# Patient Record
Sex: FEMALE | Race: WHITE
[De-identification: names, ages, dates, MRNs, and addresses within clinical notes are randomized per-mention and may not be internally consistent; named-entity substitution may affect disease eponyms.]

---

## 2017-05-22 ENCOUNTER — APPOINTMENT (OUTPATIENT)
Dept: DERMATOLOGY | Facility: CLINIC | Age: 72
End: 2017-05-22

## 2017-05-22 DIAGNOSIS — Z87.39 PERSONAL HISTORY OF OTHER DISEASES OF THE MUSCULOSKELETAL SYSTEM AND CONNECTIVE TISSUE: ICD-10-CM

## 2017-05-22 DIAGNOSIS — Z86.79 PERSONAL HISTORY OF OTHER DISEASES OF THE CIRCULATORY SYSTEM: ICD-10-CM

## 2017-05-22 DIAGNOSIS — Z41.1 ENCOUNTER FOR COSMETIC SURGERY: ICD-10-CM

## 2017-07-07 ENCOUNTER — APPOINTMENT (OUTPATIENT)
Dept: MAMMOGRAPHY | Facility: CLINIC | Age: 72
End: 2017-07-07

## 2017-07-07 ENCOUNTER — APPOINTMENT (OUTPATIENT)
Dept: OBGYN | Facility: CLINIC | Age: 72
End: 2017-07-07

## 2017-07-07 ENCOUNTER — APPOINTMENT (OUTPATIENT)
Dept: RADIOLOGY | Facility: CLINIC | Age: 72
End: 2017-07-07

## 2017-09-05 ENCOUNTER — APPOINTMENT (OUTPATIENT)
Dept: OBGYN | Facility: CLINIC | Age: 72
End: 2017-09-05
Payer: MEDICARE

## 2017-09-05 VITALS
BODY MASS INDEX: 31.41 KG/M2 | WEIGHT: 184 LBS | SYSTOLIC BLOOD PRESSURE: 120 MMHG | HEIGHT: 64 IN | DIASTOLIC BLOOD PRESSURE: 70 MMHG

## 2017-09-05 DIAGNOSIS — N32.81 OVERACTIVE BLADDER: ICD-10-CM

## 2017-09-05 PROCEDURE — 99213 OFFICE O/P EST LOW 20 MIN: CPT

## 2017-09-07 LAB — HPV HIGH+LOW RISK DNA PNL CVX: NEGATIVE

## 2017-09-08 LAB — CYTOLOGY CVX/VAG DOC THIN PREP: NORMAL

## 2017-10-17 ENCOUNTER — FORM ENCOUNTER (OUTPATIENT)
Age: 72
End: 2017-10-17

## 2017-10-18 ENCOUNTER — APPOINTMENT (OUTPATIENT)
Dept: MAMMOGRAPHY | Facility: CLINIC | Age: 72
End: 2017-10-18
Payer: MEDICARE

## 2017-10-18 ENCOUNTER — OUTPATIENT (OUTPATIENT)
Dept: OUTPATIENT SERVICES | Facility: HOSPITAL | Age: 72
LOS: 1 days | End: 2017-10-18
Payer: MEDICARE

## 2017-10-18 DIAGNOSIS — Z00.8 ENCOUNTER FOR OTHER GENERAL EXAMINATION: ICD-10-CM

## 2017-10-18 PROCEDURE — 77063 BREAST TOMOSYNTHESIS BI: CPT

## 2017-10-18 PROCEDURE — G0202: CPT | Mod: 26

## 2017-10-18 PROCEDURE — 77063 BREAST TOMOSYNTHESIS BI: CPT | Mod: 26

## 2017-10-18 PROCEDURE — 77067 SCR MAMMO BI INCL CAD: CPT

## 2017-11-02 ENCOUNTER — OTHER (OUTPATIENT)
Age: 72
End: 2017-11-02

## 2017-11-02 ENCOUNTER — RESULT CHARGE (OUTPATIENT)
Age: 72
End: 2017-11-02

## 2017-11-02 LAB
BILIRUB UR QL STRIP: NORMAL
GLUCOSE UR-MCNC: NORMAL
HCG UR QL: 0.2 EU/DL
HGB UR QL STRIP.AUTO: NORMAL
KETONES UR-MCNC: NORMAL
LEUKOCYTE ESTERASE UR QL STRIP: NORMAL
NITRITE UR QL STRIP: NORMAL
PH UR STRIP: 7
PROT UR STRIP-MCNC: NORMAL
SP GR UR STRIP: 1.01

## 2017-11-06 LAB — BACTERIA UR CULT: NORMAL

## 2017-12-20 ENCOUNTER — APPOINTMENT (OUTPATIENT)
Dept: DERMATOLOGY | Facility: CLINIC | Age: 72
End: 2017-12-20
Payer: MEDICARE

## 2017-12-20 VITALS — BODY MASS INDEX: 31.41 KG/M2 | HEIGHT: 64 IN | WEIGHT: 184 LBS

## 2017-12-20 PROCEDURE — 99213 OFFICE O/P EST LOW 20 MIN: CPT

## 2018-01-13 ENCOUNTER — APPOINTMENT (OUTPATIENT)
Dept: DERMATOLOGY | Facility: CLINIC | Age: 73
End: 2018-01-13
Payer: MEDICARE

## 2018-01-13 PROCEDURE — 99213 OFFICE O/P EST LOW 20 MIN: CPT

## 2018-05-16 ENCOUNTER — APPOINTMENT (OUTPATIENT)
Dept: DERMATOLOGY | Facility: CLINIC | Age: 73
End: 2018-05-16

## 2018-05-21 ENCOUNTER — APPOINTMENT (OUTPATIENT)
Dept: DERMATOLOGY | Facility: CLINIC | Age: 73
End: 2018-05-21
Payer: MEDICARE

## 2018-05-21 DIAGNOSIS — D18.00 HEMANGIOMA UNSPECIFIED SITE: ICD-10-CM

## 2018-05-21 PROCEDURE — 17000 DESTRUCT PREMALG LESION: CPT

## 2018-05-21 PROCEDURE — 99213 OFFICE O/P EST LOW 20 MIN: CPT | Mod: 25

## 2018-05-21 RX ORDER — CELECOXIB 200 MG/1
200 CAPSULE ORAL
Qty: 30 | Refills: 0 | Status: DISCONTINUED | COMMUNITY
Start: 2017-07-16 | End: 2018-05-21

## 2018-05-21 RX ORDER — TRAMADOL HYDROCHLORIDE 50 MG/1
50 TABLET, COATED ORAL
Qty: 60 | Refills: 0 | Status: DISCONTINUED | COMMUNITY
Start: 2017-07-16 | End: 2018-05-21

## 2018-05-21 RX ORDER — PHYTONADIONE 5 MG/1
5 TABLET ORAL
Qty: 1 | Refills: 0 | Status: DISCONTINUED | COMMUNITY
Start: 2017-07-16 | End: 2018-05-21

## 2018-05-21 RX ORDER — OXYCODONE 5 MG/1
5 TABLET ORAL
Qty: 30 | Refills: 0 | Status: DISCONTINUED | COMMUNITY
Start: 2017-07-16 | End: 2018-05-21

## 2018-05-21 RX ORDER — WARFARIN 1 MG/1
1 TABLET ORAL
Qty: 175 | Refills: 0 | Status: DISCONTINUED | COMMUNITY
Start: 2017-07-16 | End: 2018-05-21

## 2018-10-29 ENCOUNTER — APPOINTMENT (OUTPATIENT)
Dept: DERMATOLOGY | Facility: CLINIC | Age: 73
End: 2018-10-29
Payer: MEDICARE

## 2018-10-29 DIAGNOSIS — L40.9 PSORIASIS, UNSPECIFIED: ICD-10-CM

## 2018-10-29 DIAGNOSIS — L24.89 IRRITANT CONTACT DERMATITIS DUE TO OTHER AGENTS: ICD-10-CM

## 2018-10-29 PROCEDURE — 11100 BX SKIN SUBCUTANEOUS&/MUCOUS MEMBRANE 1 LESION: CPT

## 2018-10-29 PROCEDURE — 99214 OFFICE O/P EST MOD 30 MIN: CPT | Mod: 25

## 2018-12-10 LAB — CORE LAB BIOPSY: NORMAL

## 2019-01-08 ENCOUNTER — INPATIENT (INPATIENT)
Facility: HOSPITAL | Age: 74
LOS: 0 days | Discharge: HOME | End: 2019-01-09
Attending: INTERNAL MEDICINE | Admitting: INTERNAL MEDICINE
Payer: MEDICARE

## 2019-01-08 VITALS
HEART RATE: 63 BPM | TEMPERATURE: 98 F | WEIGHT: 160.06 LBS | HEIGHT: 63 IN | DIASTOLIC BLOOD PRESSURE: 65 MMHG | RESPIRATION RATE: 16 BRPM | OXYGEN SATURATION: 100 % | SYSTOLIC BLOOD PRESSURE: 185 MMHG

## 2019-01-08 LAB
ALBUMIN SERPL ELPH-MCNC: 3.7 G/DL — SIGNIFICANT CHANGE UP (ref 3.3–5)
ALP SERPL-CCNC: 78 U/L — SIGNIFICANT CHANGE UP (ref 40–120)
ALT FLD-CCNC: 24 U/L — SIGNIFICANT CHANGE UP (ref 12–78)
ANION GAP SERPL CALC-SCNC: 8 MMOL/L — SIGNIFICANT CHANGE UP (ref 5–17)
APTT BLD: 28.7 SEC — SIGNIFICANT CHANGE UP (ref 27.5–36.3)
AST SERPL-CCNC: 19 U/L — SIGNIFICANT CHANGE UP (ref 15–37)
BILIRUB SERPL-MCNC: 0.4 MG/DL — SIGNIFICANT CHANGE UP (ref 0.2–1.2)
BUN SERPL-MCNC: 19 MG/DL — SIGNIFICANT CHANGE UP (ref 7–23)
CALCIUM SERPL-MCNC: 9 MG/DL — SIGNIFICANT CHANGE UP (ref 8.5–10.1)
CHLORIDE SERPL-SCNC: 110 MMOL/L — HIGH (ref 96–108)
CO2 SERPL-SCNC: 25 MMOL/L — SIGNIFICANT CHANGE UP (ref 22–31)
CREAT SERPL-MCNC: 0.73 MG/DL — SIGNIFICANT CHANGE UP (ref 0.5–1.3)
GLUCOSE SERPL-MCNC: 163 MG/DL — HIGH (ref 70–99)
HCT VFR BLD CALC: 40.9 % — SIGNIFICANT CHANGE UP (ref 34.5–45)
HGB BLD-MCNC: 13.3 G/DL — SIGNIFICANT CHANGE UP (ref 11.5–15.5)
INR BLD: 1.04 RATIO — SIGNIFICANT CHANGE UP (ref 0.88–1.16)
MAGNESIUM SERPL-MCNC: 2.1 MG/DL — SIGNIFICANT CHANGE UP (ref 1.6–2.6)
MCHC RBC-ENTMCNC: 29.1 PG — SIGNIFICANT CHANGE UP (ref 27–34)
MCHC RBC-ENTMCNC: 32.5 GM/DL — SIGNIFICANT CHANGE UP (ref 32–36)
MCV RBC AUTO: 89.5 FL — SIGNIFICANT CHANGE UP (ref 80–100)
NRBC # BLD: 0 /100 WBCS — SIGNIFICANT CHANGE UP (ref 0–0)
NT-PROBNP SERPL-SCNC: 128 PG/ML — SIGNIFICANT CHANGE UP (ref 0–450)
PLATELET # BLD AUTO: 206 K/UL — SIGNIFICANT CHANGE UP (ref 150–400)
POTASSIUM SERPL-MCNC: 4.1 MMOL/L — SIGNIFICANT CHANGE UP (ref 3.5–5.3)
POTASSIUM SERPL-SCNC: 4.1 MMOL/L — SIGNIFICANT CHANGE UP (ref 3.5–5.3)
PROT SERPL-MCNC: 7.2 GM/DL — SIGNIFICANT CHANGE UP (ref 6–8.3)
PROTHROM AB SERPL-ACNC: 11.6 SEC — SIGNIFICANT CHANGE UP (ref 10–12.9)
RBC # BLD: 4.57 M/UL — SIGNIFICANT CHANGE UP (ref 3.8–5.2)
RBC # FLD: 13.5 % — SIGNIFICANT CHANGE UP (ref 10.3–14.5)
SODIUM SERPL-SCNC: 143 MMOL/L — SIGNIFICANT CHANGE UP (ref 135–145)
TROPONIN I SERPL-MCNC: <0.015 NG/ML — SIGNIFICANT CHANGE UP (ref 0.01–0.04)
TROPONIN I SERPL-MCNC: <0.015 NG/ML — SIGNIFICANT CHANGE UP (ref 0.01–0.04)
WBC # BLD: 8.08 K/UL — SIGNIFICANT CHANGE UP (ref 3.8–10.5)
WBC # FLD AUTO: 8.08 K/UL — SIGNIFICANT CHANGE UP (ref 3.8–10.5)

## 2019-01-08 PROCEDURE — 99285 EMERGENCY DEPT VISIT HI MDM: CPT

## 2019-01-08 PROCEDURE — 99223 1ST HOSP IP/OBS HIGH 75: CPT

## 2019-01-08 PROCEDURE — 93010 ELECTROCARDIOGRAM REPORT: CPT

## 2019-01-08 PROCEDURE — 70498 CT ANGIOGRAPHY NECK: CPT | Mod: 26

## 2019-01-08 PROCEDURE — 70496 CT ANGIOGRAPHY HEAD: CPT | Mod: 26

## 2019-01-08 PROCEDURE — 71046 X-RAY EXAM CHEST 2 VIEWS: CPT | Mod: 26

## 2019-01-08 RX ORDER — ATORVASTATIN CALCIUM 80 MG/1
20 TABLET, FILM COATED ORAL AT BEDTIME
Qty: 0 | Refills: 0 | Status: DISCONTINUED | OUTPATIENT
Start: 2019-01-08 | End: 2019-01-09

## 2019-01-08 RX ORDER — MECLIZINE HCL 12.5 MG
25 TABLET ORAL ONCE
Qty: 0 | Refills: 0 | Status: COMPLETED | OUTPATIENT
Start: 2019-01-08 | End: 2019-01-08

## 2019-01-08 RX ORDER — ACETAMINOPHEN 500 MG
650 TABLET ORAL EVERY 6 HOURS
Qty: 0 | Refills: 0 | Status: DISCONTINUED | OUTPATIENT
Start: 2019-01-08 | End: 2019-01-09

## 2019-01-08 RX ORDER — LOSARTAN POTASSIUM 100 MG/1
100 TABLET, FILM COATED ORAL DAILY
Qty: 0 | Refills: 0 | Status: DISCONTINUED | OUTPATIENT
Start: 2019-01-08 | End: 2019-01-09

## 2019-01-08 RX ORDER — ONDANSETRON 8 MG/1
4 TABLET, FILM COATED ORAL ONCE
Qty: 0 | Refills: 0 | Status: COMPLETED | OUTPATIENT
Start: 2019-01-08 | End: 2019-01-08

## 2019-01-08 RX ORDER — MECLIZINE HCL 12.5 MG
25 TABLET ORAL EVERY 8 HOURS
Qty: 0 | Refills: 0 | Status: DISCONTINUED | OUTPATIENT
Start: 2019-01-08 | End: 2019-01-09

## 2019-01-08 RX ORDER — SODIUM CHLORIDE 9 MG/ML
1000 INJECTION INTRAMUSCULAR; INTRAVENOUS; SUBCUTANEOUS ONCE
Qty: 0 | Refills: 0 | Status: COMPLETED | OUTPATIENT
Start: 2019-01-08 | End: 2019-01-08

## 2019-01-08 RX ORDER — ASPIRIN/CALCIUM CARB/MAGNESIUM 324 MG
325 TABLET ORAL ONCE
Qty: 0 | Refills: 0 | Status: COMPLETED | OUTPATIENT
Start: 2019-01-08 | End: 2019-01-08

## 2019-01-08 RX ORDER — ENOXAPARIN SODIUM 100 MG/ML
40 INJECTION SUBCUTANEOUS EVERY 24 HOURS
Qty: 0 | Refills: 0 | Status: DISCONTINUED | OUTPATIENT
Start: 2019-01-08 | End: 2019-01-09

## 2019-01-08 RX ORDER — DIAZEPAM 5 MG
2 TABLET ORAL ONCE
Qty: 0 | Refills: 0 | Status: DISCONTINUED | OUTPATIENT
Start: 2019-01-08 | End: 2019-01-08

## 2019-01-08 RX ORDER — METOPROLOL TARTRATE 50 MG
100 TABLET ORAL DAILY
Qty: 0 | Refills: 0 | Status: DISCONTINUED | OUTPATIENT
Start: 2019-01-08 | End: 2019-01-09

## 2019-01-08 RX ORDER — METOPROLOL TARTRATE 50 MG
25 TABLET ORAL ONCE
Qty: 0 | Refills: 0 | Status: COMPLETED | OUTPATIENT
Start: 2019-01-08 | End: 2019-01-08

## 2019-01-08 RX ORDER — ONDANSETRON 8 MG/1
4 TABLET, FILM COATED ORAL EVERY 4 HOURS
Qty: 0 | Refills: 0 | Status: DISCONTINUED | OUTPATIENT
Start: 2019-01-08 | End: 2019-01-09

## 2019-01-08 RX ORDER — ASPIRIN/CALCIUM CARB/MAGNESIUM 324 MG
81 TABLET ORAL DAILY
Qty: 0 | Refills: 0 | Status: DISCONTINUED | OUTPATIENT
Start: 2019-01-08 | End: 2019-01-09

## 2019-01-08 RX ADMIN — ONDANSETRON 4 MILLIGRAM(S): 8 TABLET, FILM COATED ORAL at 07:25

## 2019-01-08 RX ADMIN — SODIUM CHLORIDE 1000 MILLILITER(S): 9 INJECTION INTRAMUSCULAR; INTRAVENOUS; SUBCUTANEOUS at 09:09

## 2019-01-08 RX ADMIN — ATORVASTATIN CALCIUM 20 MILLIGRAM(S): 80 TABLET, FILM COATED ORAL at 21:35

## 2019-01-08 RX ADMIN — SODIUM CHLORIDE 1000 MILLILITER(S): 9 INJECTION INTRAMUSCULAR; INTRAVENOUS; SUBCUTANEOUS at 07:00

## 2019-01-08 RX ADMIN — Medication 25 MILLIGRAM(S): at 08:40

## 2019-01-08 RX ADMIN — LOSARTAN POTASSIUM 100 MILLIGRAM(S): 100 TABLET, FILM COATED ORAL at 18:01

## 2019-01-08 RX ADMIN — Medication 325 MILLIGRAM(S): at 10:09

## 2019-01-08 RX ADMIN — Medication 25 MILLIGRAM(S): at 10:09

## 2019-01-08 RX ADMIN — ENOXAPARIN SODIUM 40 MILLIGRAM(S): 100 INJECTION SUBCUTANEOUS at 18:02

## 2019-01-08 NOTE — ED PROVIDER NOTE - MEDICAL DECISION MAKING DETAILS
73 YOF PMH 40 pack years, HTN, p/w vertigo that began this morning at 0500.  Significant HTN.  Exam NIH 0 no signs of CVA at this time.  DDX TIA, BPPV, electrolyte abnormality, VB insufficiency, ACS.  Given presentation and significant risk factors ABCD2 score 4, smoking, HTN, PT will require admission for MRI, neurology consultation and secondary prevention.  In the ED metabolic and hematologic evaluation, CTA r/o VB insufficiency, symptomatic treatment, EKG and trop r/o ACS.  Reassess.

## 2019-01-08 NOTE — ED ADULT NURSE REASSESSMENT NOTE - NS ED NURSE REASSESS COMMENT FT1
Pt reports feeling good relief with medication at this time. Awaiting admission at this time. Pt and family updated on POC and process for admission with verbalized understanding.

## 2019-01-08 NOTE — ED ADULT NURSE NOTE - OBJECTIVE STATEMENT
Patient presents to ED complaining of dizziness. Patient states she woke up an hour ago, when laying back in bed patient felt the room spinning, patient had orange juice and became nauseous. Patient states dizziness worsens when she moves her head. Patient denies recent fever, chills, diarrhea, CP, SOB. Patient denies syncope, abdominal pain. Patient vomiting on arrival. Patient states she had a similar episode last week that resolved after an hour. PMHx HTN

## 2019-01-08 NOTE — CONSULT NOTE ADULT - SUBJECTIVE AND OBJECTIVE BOX
Patient is a 73y old  Female who presents with a chief complaint of complain of vertigo.    HPI:  74 yo female  PMH of HTN, presented with dizziness associated with nausea and vomiting that started this am. She states that she had milder symptoms yesterday but ignored.  She presented to the hospital and was being evaluated for neurological and cardiac causes.  She states that her symptoms are slowly resolving although she still feels dizzy.    No CP or pressure.  No SOB.   Denies any palpitations, or syncope.    Family hx:  Mother: No CAD, No CVA  Father: No CAD, No CVA (08 Jan 2019 12:31)      PAST MEDICAL & SURGICAL HISTORY:  HTN (hypertension)  No significant past surgical history      MEDICATIONS  (STANDING):  aspirin enteric coated 81 milliGRAM(s) Oral daily  atorvastatin 20 milliGRAM(s) Oral at bedtime  enoxaparin Injectable 40 milliGRAM(s) SubCutaneous every 24 hours  losartan 100 milliGRAM(s) Oral daily  metoprolol succinate  milliGRAM(s) Oral daily    MEDICATIONS  (PRN):  acetaminophen   Tablet .. 650 milliGRAM(s) Oral every 6 hours PRN Mild Pain (1 - 3)  acetaminophen   Tablet .. 650 milliGRAM(s) Oral every 6 hours PRN Temp greater or equal to 38C (100.4F)  meclizine 25 milliGRAM(s) Oral every 8 hours PRN Dizziness  ondansetron Injectable 4 milliGRAM(s) IV Push every 4 hours PRN Nausea and/or Vomiting      FAMILY HISTORY:  No pertinent family history in first degree relatives      SOCIAL HISTORY:  active smoker, 5 cig/d    REVIEW OF SYSTEMS:  CONSTITUTIONAL:    No fatigue, malaise, lethargy.  No fever or chills.  HEENT:  Eyes:  No visual changes.     ENT:  No epistaxis.  No sinus pain.    RESPIRATORY:  No cough.  No wheeze.  No hemoptysis.  No shortness of breath.  CARDIOVASCULAR:  No chest pains.  No palpitations. No shortness of breath, No orthopnea or PND. c/o dizziness  GASTROINTESTINAL:  No abdominal pain.  c/o nausea or vomiting.    GENITOURINARY:    No hematuria.    MUSCULOSKELETAL:  No musculoskeletal pain.  No joint swelling.  No arthritis.  NEUROLOGICAL:  No tingling or numbness or weakness.  PSYCHIATRIC:  No confusion  SKIN:  No rashes.    ENDOCRINE:  No unexplained weight loss.  No polydipsia.   HEMATOLOGIC:  No anemia.  No prolonged or excessive bleeding.   ALLERGIC AND IMMUNOLOGIC:  No pruritus.          Vital Signs Last 24 Hrs  T(C): 36.6 (08 Jan 2019 06:44), Max: 36.6 (08 Jan 2019 06:44)  T(F): 97.8 (08 Jan 2019 06:44), Max: 97.8 (08 Jan 2019 06:44)  HR: 59 (08 Jan 2019 13:15) (59 - 63)  BP: 122/59 (08 Jan 2019 13:15) (119/59 - 185/65)  BP(mean): --  RR: 18 (08 Jan 2019 13:15) (16 - 18)  SpO2: 96% (08 Jan 2019 13:15) (96% - 100%)    PHYSICAL EXAM-    Constitutional: no acute distress    Head: Head is normocephalic and atraumatic.      Neck:  No JVD.     Cardiovascular: Regular rate and rhythm without S3, S4. No murmurs or rubs are appreciated.      Respiratory: Breathsounds are normal. No rales. No wheezing.    Abdomen: Soft, nontender, nondistended with positive bowel sounds.      Extremity: No tenderness. No  pitting edema     Neurologic: The patient is alert and oriented.      Skin: No rash, no obvious lesions noted.      Psychiatric: The patient appears to be emotionally stable.      INTERPRETATION OF TELEMETRY: sinus bradycardia 40/min    ECG: Sinus rythm ,L axis,  Twave inversion in III and AVF, V5-6.    I&O's Detail      LABS:                        13.3   8.08  )-----------( 206      ( 08 Jan 2019 06:56 )             40.9     01-08    143  |  110<H>  |  19  ----------------------------<  163<H>  4.1   |  25  |  0.73    Ca    9.0      08 Jan 2019 06:56  Mg     2.1     01-08    TPro  7.2  /  Alb  3.7  /  TBili  0.4  /  DBili  x   /  AST  19  /  ALT  24  /  AlkPhos  78  01-08    CARDIAC MARKERS ( 08 Jan 2019 06:56 )  <0.015 ng/mL / x     / x     / x     / x          PT/INR - ( 08 Jan 2019 06:56 )   PT: 11.6 sec;   INR: 1.04 ratio         PTT - ( 08 Jan 2019 06:56 )  PTT:28.7 sec    I&O's Summary    BNPSerum Pro-Brain Natriuretic Peptide: 128 pg/mL (01-08 @ 06:56)    RADIOLOGY & ADDITIONAL STUDIES:  < from: CT Angio Head w/ IV Cont (01.08.19 @ 08:47) >  IMPRESSION:          1.   Right carotid system:   Focal plaque at the bifurcation with   focal moderate (50%) stenosis at the origin of the RIGHT internal carotid   artery although visualization is hindered by patient motion at this   level.              2.   Left carotid system:  Nohemodynamically significant stenosis.           3.   Intracranial circulation:  No significant vascular lesion.             4.   Brain:  No significant lesion identified.                      JOSÉ TSE M.D., ATTENDING RADIOLOGIST  This document has been electronically signed. Jan 8 2019  8:51AM    < end of copied text >

## 2019-01-08 NOTE — ED PROVIDER NOTE - CRANIAL NERVE AND PUPILLARY EXAM
cough reflex intact/corneal reflex intact/cranial nerves 2-12 intact/central and peripheral vision intact/gag reflex intact/tongue is midline

## 2019-01-08 NOTE — ED PROVIDER NOTE - OBJECTIVE STATEMENT
73 YOF PMH 40 pack years, HTN, p/w vertigo that began this morning at 0500.  Sx were persistent associated N/V worse with head movement.  In the ED SX resolved completely with the exception of nausea and light headed feelings.  In the ED at 0736 No apraxia, aphasia, agnosia, dysphonia, dysarthria, dysphagia,  paresthesia, paralysis, unilateral weakness, sensory deficits in the face or extremities, vertigo, vision changes, headache, nausea, vomiting, or loss of consciousness.  At no time was there chest pain or SOB.  No fevers, chills, sweats, weight loss, fatigue, or malaise. No urinary symptoms.  PT had a similar presentation 2 weeks ago that resolved spontaneously.      MEDS metoprolol and losartan.

## 2019-01-08 NOTE — CONSULT NOTE ADULT - ASSESSMENT
74 yo female with PMH of HTN, an episode of mild vertigo 2 weeks ago; presented to ED with c/o sudden onset dizziness, lightheadedness,  head felt heavy like it was spinning, associated with nauseous and vomited; sx worsened with head movements. In the ED, symptoms improved, she continued to have lightheadedness.     Pt was evaluated for stroke; CT head/neck angio did not reveal territorial stroke, thrombus of VB stenosis, MI 50 % stenosis was noted.      # Most likely BPPV; now improved    - Pt educated about postural changes.  - If sx recur, will need vestibular therapy as OP    # TIA (brainstem) cannot be excluded; No IV tpa given because sx resolved fully; NIHSS 0    - Agree with ASA,   - Lipid profile, Statin  - MRI brain  - PT    # MI 50% stenosis; asymptomatic    D/W pt and her family

## 2019-01-08 NOTE — CONSULT NOTE ADULT - SUBJECTIVE AND OBJECTIVE BOX
CC: 73 y old  Female who presents with a chief complaint of complain of vertigo (08 Jan 2019 14:34)    HPI:  72 yo female with PMH of HTN, presented to ED with c/o vertigo. Pt states she woke up at 0500 AM to go to the bathroom, upon returning back she felt dizzy, her head felt heavy like it was spinning, she was nauseous and vomited, was unable to walk; sx worsened with head movements. In the ED, her symptoms improved, she continued to have lightheadedness. Pt was evaluated for stroke; CT head/neck angio did not reveal territorial stroke, thrombus of VB stenosis, MI 50 % stenosis was noted.      Pt denies associated aphasia, dysarthria, dysphagia,  paresthesia, focal weakness, facial droop, visual blurring, headache or loss of consciousness. Patient had a similar but milder vertigo epiosde 2 weeks ago that resolved spontaneously.    No history of recent fever, ear pain, skin rashes; but does admit to some UR symptoms.     PAST MEDICAL & SURGICAL HISTORY:  HTN (hypertension)    FAMILY HISTORY:  No pertinent family history in first degree relatives    Social Hx: Nonsmoker, no drug or alcohol use    MEDICATIONS  (STANDING):  aspirin enteric coated 81 milliGRAM(s) Oral daily  atorvastatin 20 milliGRAM(s) Oral at bedtime  enoxaparin Injectable 40 milliGRAM(s) SubCutaneous every 24 hours  losartan 100 milliGRAM(s) Oral daily  metoprolol succinate  milliGRAM(s) Oral daily     Allergies  No Known Allergies    Intolerances    ROS: Pertinent positives in HPI, all other ROS were reviewed and are negative.      Vital Signs Last 24 Hrs  T(C): 36.6 (08 Jan 2019 06:44), Max: 36.6 (08 Jan 2019 06:44)  T(F): 97.8 (08 Jan 2019 06:44), Max: 97.8 (08 Jan 2019 06:44)  HR: 59 (08 Jan 2019 13:15) (59 - 63)  BP: 122/59 (08 Jan 2019 13:15) (119/59 - 185/65)  BP(mean): --  RR: 18 (08 Jan 2019 13:15) (16 - 18)  SpO2: 96% (08 Jan 2019 13:15) (96% - 100%)    PE:  Constitutional: Normocephalic, awake and alert.  HEENT: PERRLA, EOMI, no nystagmus, no diplopia  Neck: Supple.  Respiratory: Breath sounds are clear bilaterally  Cardiovascular: S1 and S2, regular   Extremities:  no edema  Vascular: Caritid Bruit - no  Musculoskeletal: no joint swelling/tenderness, no abnormal movements  Skin: No rashes    Neurological exam:  HF: A x O x 3. Appropriately interactive, normal affect. Speech fluent, No Aphasia or paraphasic errors. Naming /repetition intact   CN: RONALD, EOMI, VFF, facial sensation normal, no NLFD, tongue midline, Palate moves equally, SCM equal bilaterally  Motor: No pronator drift, Strength 5/5 in all 4 ext, normal bulk and tone, no tremor, rigidity .    Sens: Intact to light touch     Reflexes: Symmetric 2+, AJ 0, downgoing toes b/l  Coord:  No FNFA, dysmetria,   Gait/Balance: Not tested    NIHSS: 0    Labs:   01-08    143  |  110<H>  |  19  ----------------------------<  163<H>  4.1   |  25  |  0.73    Ca    9.0      08 Jan 2019 06:56  Mg     2.1     01-08    TPro  7.2  /  Alb  3.7  /  TBili  0.4  /  DBili  x   /  AST  19  /  ALT  24  /  AlkPhos  78  01-08                     13.3   8.08  )-----------( 206      ( 08 Jan 2019 06:56 )             40.9       Radiology:  - CT Head angio: < from: CT Angio Head w/ IV Cont (01.08.19 @ 08:47) >  IMPRESSION:          1.   Right carotid system:   Focal plaque at the bifurcation with   focal moderate (50%) stenosis at the origin of the RIGHT internal carotid   artery although visualization is hindered by patient motion at this   level.              2.   Left carotid system:  Nohemodynamically significant stenosis.           3.   Intracranial circulation:  No significant vascular lesion.             4.   Brain:  No significant lesion identified.          < end of copied text >

## 2019-01-08 NOTE — ED ADULT TRIAGE NOTE - CHIEF COMPLAINT QUOTE
States she was feeling fine all day and night, just woke up to go to bathroom and became extremely dizzy. Dizziness has been unresolved since with new onset of nausea. Denies any other complaints.

## 2019-01-08 NOTE — CONSULT NOTE ADULT - ASSESSMENT
DIzziness- more so vertigenous in nature.  Her CT head did not reveal any acute abnormalities.  Her MRI is pending.  Check orthostatic BP readings.  She denies decreased po intake.  No arrythmias on telemetry so far.  EKG or blood work did not reveal any ischemia.   Her Nuclear stress test from 2016 did not reveal any ischemia.  Echocardiogram showed normal LVEF in 2017.    Carotid artery disease- Right side- aggressive risk factor modification.  Check lipid panel and uptitrate statin as needed.     HTN- BP at presentation was noted to be high but now it is noted to be normal.  Continue home BP meds.    Hyperlipidemia- continue statin.

## 2019-01-08 NOTE — ED ADULT NURSE NOTE - NSIMPLEMENTINTERV_GEN_ALL_ED
Implemented All Fall Risk Interventions:  [place] to call system. Call bell, personal items and telephone within reach. Instruct patient to call for assistance. Room bathroom lighting operational. Non-slip footwear when patient is off stretcher. Physically safe environment: no spills, clutter or unnecessary equipment. Stretcher in lowest position, wheels locked, appropriate side rails in place. Provide visual cue, wrist band, yellow gown, etc. Monitor gait and stability. Monitor for mental status changes and reorient to person, place, and time. Review medications for side effects contributing to fall risk. Reinforce activity limits and safety measures with patient and family.

## 2019-01-08 NOTE — H&P ADULT - HISTORY OF PRESENT ILLNESS
74 yo female  PMH of HTN, presented with vertigo that began this morning at 0500.  Symptoms were persistent with associated N/V which worse with head movement.  In the ED her symptoms resolved completely with the exception of nausea and light headed feelings.  In the ED at 0736 No apraxia, aphasia, agnosia, dysphonia, dysarthria, dysphagia,  paresthesia, paralysis, unilateral weakness, sensory deficits in the face or extremities, vertigo, vision changes, headache, nausea, vomiting, or loss of consciousness.  At no time was there chest pain or SOB.  No fevers, chills, sweats, weight loss, fatigue, or malaise. No urinary symptoms.  Patient had a similar presentation 2 weeks ago that resolved spontaneously.    Family hx:  Mother: No CAD, No CVA  Father: No CAD, No CVA

## 2019-01-08 NOTE — H&P ADULT - NEUROLOGICAL DETAILS
normal strength/alert and oriented x 3/responds to pain/responds to verbal commands/sensation intact/deep reflexes intact/cranial nerves intact

## 2019-01-08 NOTE — H&P ADULT - ASSESSMENT
74 yo female presented with severe vertigo.    A/P:    1.  Severe Vertigo  Possible TIA  -will follow in Telemetry  -CT head-neg  -will get MRI brain  -follow echo report  -CTA neck-50% stenosis at the origin of the Right internal carotid   -follow Lipid profile, HbA1C, PT eval  -fall precaution  -will follow Neurology consult  -patient has some concern about her cardiac status as some outpt tests are pending in the next few days, will follow cardiology consult regarding that  -follow clinically with neurochecks   -on aspirin and statin  -give meclizine and zofran as needed    2.  HTN  -stable now  -follow BP and adjust meds as needed    3.  Lovenox for DVT ppx 74 yo female presented with severe vertigo.    A/P:    1.  Severe Vertigo  Possible TIA  -will follow in Telemetry  -CT head-neg  -will get MRI brain  -follow echo report  -CTA neck-50% stenosis at the origin of the Right internal carotid   -follow Lipid profile, HbA1C, PT eval  -fall precaution  -will follow Neurology consult  -patient has some concern about her cardiac status as some outpt tests are pending in the next few days, will follow cardiology consult regarding that  -follow clinically with neurochecks   -on aspirin and statin  -give meclizine and zofran as needed    2.  HTN  -stable now  -follow BP and adjust meds as needed    3.  Lovenox for DVT ppx     4.  Code status: Full code.

## 2019-01-08 NOTE — ED PROVIDER NOTE - PROGRESS NOTE DETAILS
Justification for admission persistent nausea, high risk TIA.  NIH at this time remains 0.  Given presentation and findings, patient requires hospitalization.  I discussed all findings from our evaluation today with the patient and the medical necessity for admission to the hospital.  I addressed expectations regarding the admission and I discussed the plan of care in detail.  I addressed all questions and concerns regarding the admission and the plan of care.  I used verbal teach back to confirm understanding.  The patient agreed with the admission and the plan of care.  Pt signed out to Dr. Alexandre.  Patient's history, vital signs, lab results, imaging, pertinent findings, and clinical condition reviewed during sign out.  At sign out patient admitted in hemodynamically stable condition in no acute distress.  Neurology Justification for admission persistent nausea, high risk TIA.  NIH at this time remains 0.  Given presentation and findings, patient requires hospitalization.  I discussed all findings from our evaluation today with the patient and the medical necessity for admission to the hospital.  I addressed expectations regarding the admission and I discussed the plan of care in detail.  I addressed all questions and concerns regarding the admission and the plan of care.  I used verbal teach back to confirm understanding.  The patient agreed with the admission and the plan of care.  Pt signed out to Dr. Alexandre.  Patient's history, vital signs, lab results, imaging, pertinent findings, and clinical condition reviewed during sign out.  At sign out patient admitted in hemodynamically stable condition in no acute distress.  Neurology consult placed.

## 2019-01-08 NOTE — H&P ADULT - NSHPPHYSICALEXAM_GEN_ALL_CORE
Vital Signs Last 24 Hrs  T(C): 36.6 (08 Jan 2019 06:44), Max: 36.6 (08 Jan 2019 06:44)  T(F): 97.8 (08 Jan 2019 06:44), Max: 97.8 (08 Jan 2019 06:44)  HR: 60 (08 Jan 2019 10:10) (60 - 63)  BP: 141/62 (08 Jan 2019 10:10) (141/62 - 185/65)  RR: 18 (08 Jan 2019 10:10) (16 - 18)  SpO2: 96% (08 Jan 2019 10:10) (96% - 100%)

## 2019-01-09 ENCOUNTER — TRANSCRIPTION ENCOUNTER (OUTPATIENT)
Age: 74
End: 2019-01-09

## 2019-01-09 VITALS
TEMPERATURE: 98 F | RESPIRATION RATE: 18 BRPM | HEART RATE: 59 BPM | SYSTOLIC BLOOD PRESSURE: 103 MMHG | DIASTOLIC BLOOD PRESSURE: 48 MMHG | OXYGEN SATURATION: 98 %

## 2019-01-09 LAB
ANION GAP SERPL CALC-SCNC: 5 MMOL/L — SIGNIFICANT CHANGE UP (ref 5–17)
BASOPHILS # BLD AUTO: 0.04 K/UL — SIGNIFICANT CHANGE UP (ref 0–0.2)
BASOPHILS NFR BLD AUTO: 0.4 % — SIGNIFICANT CHANGE UP (ref 0–2)
BUN SERPL-MCNC: 22 MG/DL — SIGNIFICANT CHANGE UP (ref 7–23)
CALCIUM SERPL-MCNC: 8.5 MG/DL — SIGNIFICANT CHANGE UP (ref 8.5–10.1)
CHLORIDE SERPL-SCNC: 111 MMOL/L — HIGH (ref 96–108)
CHOLEST SERPL-MCNC: 135 MG/DL — SIGNIFICANT CHANGE UP (ref 10–199)
CO2 SERPL-SCNC: 27 MMOL/L — SIGNIFICANT CHANGE UP (ref 22–31)
CREAT SERPL-MCNC: 0.66 MG/DL — SIGNIFICANT CHANGE UP (ref 0.5–1.3)
EOSINOPHIL # BLD AUTO: 0.13 K/UL — SIGNIFICANT CHANGE UP (ref 0–0.5)
EOSINOPHIL NFR BLD AUTO: 1.2 % — SIGNIFICANT CHANGE UP (ref 0–6)
GLUCOSE SERPL-MCNC: 112 MG/DL — HIGH (ref 70–99)
HBA1C BLD-MCNC: 6 % — HIGH (ref 4–5.6)
HCT VFR BLD CALC: 37.2 % — SIGNIFICANT CHANGE UP (ref 34.5–45)
HDLC SERPL-MCNC: 37 MG/DL — LOW
HGB BLD-MCNC: 12 G/DL — SIGNIFICANT CHANGE UP (ref 11.5–15.5)
IMM GRANULOCYTES NFR BLD AUTO: 0.3 % — SIGNIFICANT CHANGE UP (ref 0–1.5)
LIPID PNL WITH DIRECT LDL SERPL: 77 MG/DL — SIGNIFICANT CHANGE UP
LYMPHOCYTES # BLD AUTO: 3.3 K/UL — SIGNIFICANT CHANGE UP (ref 1–3.3)
LYMPHOCYTES # BLD AUTO: 30.8 % — SIGNIFICANT CHANGE UP (ref 13–44)
MCHC RBC-ENTMCNC: 30.1 PG — SIGNIFICANT CHANGE UP (ref 27–34)
MCHC RBC-ENTMCNC: 32.3 GM/DL — SIGNIFICANT CHANGE UP (ref 32–36)
MCV RBC AUTO: 93.2 FL — SIGNIFICANT CHANGE UP (ref 80–100)
MONOCYTES # BLD AUTO: 0.66 K/UL — SIGNIFICANT CHANGE UP (ref 0–0.9)
MONOCYTES NFR BLD AUTO: 6.2 % — SIGNIFICANT CHANGE UP (ref 2–14)
NEUTROPHILS # BLD AUTO: 6.55 K/UL — SIGNIFICANT CHANGE UP (ref 1.8–7.4)
NEUTROPHILS NFR BLD AUTO: 61.1 % — SIGNIFICANT CHANGE UP (ref 43–77)
NRBC # BLD: 0 /100 WBCS — SIGNIFICANT CHANGE UP (ref 0–0)
PLATELET # BLD AUTO: 180 K/UL — SIGNIFICANT CHANGE UP (ref 150–400)
POTASSIUM SERPL-MCNC: 4.3 MMOL/L — SIGNIFICANT CHANGE UP (ref 3.5–5.3)
POTASSIUM SERPL-SCNC: 4.3 MMOL/L — SIGNIFICANT CHANGE UP (ref 3.5–5.3)
RBC # BLD: 3.99 M/UL — SIGNIFICANT CHANGE UP (ref 3.8–5.2)
RBC # FLD: 13.5 % — SIGNIFICANT CHANGE UP (ref 10.3–14.5)
SODIUM SERPL-SCNC: 143 MMOL/L — SIGNIFICANT CHANGE UP (ref 135–145)
TOTAL CHOLESTEROL/HDL RATIO MEASUREMENT: 3.6 RATIO — SIGNIFICANT CHANGE UP (ref 3.3–7.1)
TRIGL SERPL-MCNC: 105 MG/DL — SIGNIFICANT CHANGE UP (ref 10–149)
WBC # BLD: 10.71 K/UL — HIGH (ref 3.8–10.5)
WBC # FLD AUTO: 10.71 K/UL — HIGH (ref 3.8–10.5)

## 2019-01-09 PROCEDURE — 99232 SBSQ HOSP IP/OBS MODERATE 35: CPT

## 2019-01-09 PROCEDURE — 93306 TTE W/DOPPLER COMPLETE: CPT | Mod: 26

## 2019-01-09 PROCEDURE — 70551 MRI BRAIN STEM W/O DYE: CPT | Mod: 26

## 2019-01-09 RX ORDER — MECLIZINE HCL 12.5 MG
1 TABLET ORAL
Qty: 30 | Refills: 0
Start: 2019-01-09

## 2019-01-09 RX ORDER — ATORVASTATIN CALCIUM 80 MG/1
1 TABLET, FILM COATED ORAL
Qty: 30 | Refills: 0
Start: 2019-01-09

## 2019-01-09 RX ORDER — ATORVASTATIN CALCIUM 80 MG/1
1 TABLET, FILM COATED ORAL
Qty: 30 | Refills: 0 | OUTPATIENT
Start: 2019-01-09

## 2019-01-09 RX ORDER — MECLIZINE HCL 12.5 MG
1 TABLET ORAL
Qty: 30 | Refills: 0 | OUTPATIENT
Start: 2019-01-09

## 2019-01-09 RX ADMIN — Medication 100 MILLIGRAM(S): at 05:40

## 2019-01-09 RX ADMIN — LOSARTAN POTASSIUM 100 MILLIGRAM(S): 100 TABLET, FILM COATED ORAL at 05:40

## 2019-01-09 RX ADMIN — Medication 81 MILLIGRAM(S): at 12:50

## 2019-01-09 NOTE — PHYSICAL THERAPY INITIAL EVALUATION ADULT - ADDITIONAL COMMENTS
functionally independent prior to admission, does own a rw at home. pt lives with spouse at home, [address], 1 flight to second floor.

## 2019-01-09 NOTE — PROGRESS NOTE ADULT - SUBJECTIVE AND OBJECTIVE BOX
Patient is a 73y old  Female who presents with a chief complaint of complain of vertigo.    HPI:  72 yo female  PMH of HTN, presented with dizziness associated with nausea and vomiting that started this am. She states that she had milder symptoms yesterday but ignored.  She presented to the hospital and was being evaluated for neurological and cardiac causes.  She states that her symptoms are slowly resolving although she still feels dizzy.    No CP or pressure.  No SOB.   Denies any palpitations, or syncope.      1/9- pt seen and examined by me today .Pt still states that she had dizziness and dysequilibrium feeling.    Family hx:  Mother: No CAD, No CVA  Father: No CAD, No CVA (08 Jan 2019 12:31)      PAST MEDICAL & SURGICAL HISTORY:  HTN (hypertension)  No significant past surgical history      MEDICATIONS  (STANDING):  aspirin enteric coated 81 milliGRAM(s) Oral daily  atorvastatin 20 milliGRAM(s) Oral at bedtime  enoxaparin Injectable 40 milliGRAM(s) SubCutaneous every 24 hours  losartan 100 milliGRAM(s) Oral daily  metoprolol succinate  milliGRAM(s) Oral daily    MEDICATIONS  (PRN):  acetaminophen   Tablet .. 650 milliGRAM(s) Oral every 6 hours PRN Mild Pain (1 - 3)  acetaminophen   Tablet .. 650 milliGRAM(s) Oral every 6 hours PRN Temp greater or equal to 38C (100.4F)  meclizine 25 milliGRAM(s) Oral every 8 hours PRN Dizziness  ondansetron Injectable 4 milliGRAM(s) IV Push every 4 hours PRN Nausea and/or Vomiting      FAMILY HISTORY:  No pertinent family history in first degree relatives      SOCIAL HISTORY:  active smoker, 5 cig/d    REVIEW OF SYSTEMS:  CONSTITUTIONAL:    No fatigue, malaise, lethargy.  No fever or chills.  HEENT:  Eyes:  No visual changes.     ENT:  No epistaxis.  No sinus pain.    RESPIRATORY:  No cough.  No wheeze.  No hemoptysis.  No shortness of breath.  CARDIOVASCULAR:  No chest pains.  No palpitations. No shortness of breath, No orthopnea or PND. c/o dizziness  GASTROINTESTINAL:  No abdominal pain.  no nausea or vomiting.    GENITOURINARY:    No hematuria.    MUSCULOSKELETAL:  No musculoskeletal pain.  No joint swelling.  No arthritis.  NEUROLOGICAL:  No tingling or numbness or weakness.  PSYCHIATRIC:  No confusion  SKIN:  No rashes.    ENDOCRINE:  No unexplained weight loss.  No polydipsia.   HEMATOLOGIC:  No anemia.  No prolonged or excessive bleeding.   ALLERGIC AND IMMUNOLOGIC:  No pruritus.          Vital Signs Last 24 Hrs  T(C): 36.6 (08 Jan 2019 06:44), Max: 36.6 (08 Jan 2019 06:44)  T(F): 97.8 (08 Jan 2019 06:44), Max: 97.8 (08 Jan 2019 06:44)  HR: 59 (08 Jan 2019 13:15) (59 - 63)  BP: 122/59 (08 Jan 2019 13:15) (119/59 - 185/65)  BP(mean): --  RR: 18 (08 Jan 2019 13:15) (16 - 18)  SpO2: 96% (08 Jan 2019 13:15) (96% - 100%)    PHYSICAL EXAM-    Constitutional: no acute distress    Head: Head is normocephalic and atraumatic.      Neck:  No JVD.     Cardiovascular: Regular rate and rhythm without S3, S4. No murmurs or rubs are appreciated.      Respiratory: Breathsounds are normal. No rales. No wheezing.    Abdomen: Soft, nontender, nondistended with positive bowel sounds.      Extremity: No tenderness. No  pitting edema     Neurologic: The patient is alert and oriented.      Skin: No rash, no obvious lesions noted.      Psychiatric: The patient appears to be emotionally stable.      INTERPRETATION OF TELEMETRY: sinus bradycardia 40/min, SR    ECG: Sinus rythm ,L axis,  Twave inversion in III and AVF, V5-6.    I&O's Detail      LABS:                        13.3   8.08  )-----------( 206      ( 08 Jan 2019 06:56 )             40.9     01-08    143  |  110<H>  |  19  ----------------------------<  163<H>  4.1   |  25  |  0.73    Ca    9.0      08 Jan 2019 06:56  Mg     2.1     01-08    TPro  7.2  /  Alb  3.7  /  TBili  0.4  /  DBili  x   /  AST  19  /  ALT  24  /  AlkPhos  78  01-08    CARDIAC MARKERS ( 08 Jan 2019 06:56 )  <0.015 ng/mL / x     / x     / x     / x          PT/INR - ( 08 Jan 2019 06:56 )   PT: 11.6 sec;   INR: 1.04 ratio         PTT - ( 08 Jan 2019 06:56 )  PTT:28.7 sec    I&O's Summary    BNPSerum Pro-Brain Natriuretic Peptide: 128 pg/mL (01-08 @ 06:56)    RADIOLOGY & ADDITIONAL STUDIES:  < from: CT Angio Head w/ IV Cont (01.08.19 @ 08:47) >  IMPRESSION:          1.   Right carotid system:   Focal plaque at the bifurcation with   focal moderate (50%) stenosis at the origin of the RIGHT internal carotid   artery although visualization is hindered by patient motion at this   level.              2.   Left carotid system:  Nohemodynamically significant stenosis.           3.   Intracranial circulation:  No significant vascular lesion.             4.   Brain:  No significant lesion identified.                      JOSÉ TSE M.D., ATTENDING RADIOLOGIST  This document has been electronically signed. Jan 8 2019  8:51AM    < end of copied text >

## 2019-01-09 NOTE — DISCHARGE NOTE ADULT - NS AS DC STROKE ED MATERIALS
Stroke Warning Signs and Symptoms/Risk Factors for Stroke/Call 911 for Stroke/Stroke Education Booklet/Prescribed Medications/Need for Followup After Discharge

## 2019-01-09 NOTE — PROGRESS NOTE ADULT - ASSESSMENT
DIzziness- pt states that it feels like dysequilibrium which she never had before.  Her CT head did not reveal any acute abnormalities.  Her MRI is pending for today.   Check orthostatic BP readings today  She denies decreased po intake.  No arrhythmias on telemetry so far.  EKG or blood work did not reveal any ischemia.   Her Nuclear stress test from 2016 did not reveal any ischemia.  Echocardiogram showed normal LVEF in 2017.    Carotid artery disease- Right side- aggressive risk factor modification.  Check lipid panel and uptitrate statin as needed.     HTN- BP at presentation was noted to be high but now it is noted to be normal.  Continue home BP meds.    Hyperlipidemia- continue statin.     Other medical issues- Management per primary team.   Thank you for allowing me to participate in the care of this patient. Please feel free to contact me with any questions.

## 2019-01-09 NOTE — DISCHARGE NOTE ADULT - HOSPITAL COURSE
72 yo female  PMH of HTN, presented with vertigo that began this morning at 0500.  Symptoms were persistent with associated N/V which worse with head movement.  In the ED her symptoms resolved completely with the exception of nausea and light headed feelings.  In the ED at 0736 No apraxia, aphasia, agnosia, dysphonia, dysarthria, dysphagia,  paresthesia, paralysis, unilateral weakness, sensory deficits in the face or extremities, vertigo, vision changes, headache, nausea, vomiting, or loss of consciousness.  At no time was there chest pain or SOB.  No fevers, chills, sweats, weight loss, fatigue, or malaise. No urinary symptoms.  Patient had a similar presentation 2 weeks ago that resolved spontaneously.    1.9: no vertigo, mri no cva            REVIEW OF SYSTEMS:    CONSTITUTIONAL: No weakness, No fevers or chills  ENT: No ear ache, No sorethroat  NECK: No pain, No stiffness  RESPIRATORY: No cough, No wheezing, No hemoptysis; No dyspnea  CARDIOVASCULAR: No chest pain, No palpitations  GASTROINTESTINAL: No abd pain, No nausea, No vomiting, No hematemesis, No diarrhea or constipation. No melena, No hematochezia.  GENITOURINARY: No dysuria, No  hematuria  NEUROLOGICAL: No diplopia, No paresthesia, No motor dysfunction  MUSCULOSKELETAL: No arthralgia, No myalgia  SKIN: No rashes, or lesions   PSYCH: no anxiety, no suicidal ideation    All other review of systems is negative unless indicated above    Vital Signs Last 24 Hrs  T(C): 36.9 (09 Jan 2019 11:08), Max: 36.9 (09 Jan 2019 05:29)  T(F): 98.5 (09 Jan 2019 11:08), Max: 98.5 (09 Jan 2019 11:08)  HR: 59 (09 Jan 2019 11:08) (59 - 62)  BP: 103/48 (09 Jan 2019 11:08) (103/48 - 158/75)  RR: 18 (09 Jan 2019 11:08) (16 - 18)  SpO2: 98% (09 Jan 2019 11:08) (65% - 98%)    PHYSICAL EXAM:    GENERAL: NAD, Well nourished  HEENT:  NC/AT, EOMI, PERRLA, No scleral icterus, Moist mucous membranes  NECK: Supple, No JVD  CNS:  Alert & Oriented X3, Motor Strength 5/5 B/L upper and lower extremities; DTRs 2+ intact   LUNG: Normal Breath sounds, Clear to auscultation bilaterally, No rales, No rhonchi, No wheezing  HEART: RRR; No murmurs, No rubs  ABDOMEN: +BS, ST/ND/NT  GENITOURINARY: Voiding, Bladder not distended  EXTREMITIES:  2+ Peripheral Pulses, No clubbing, No cyanosis, No tibial edema  MUSCULOSKELTAL: Joints normal ROM, No TTP, No effusion  VAGINAL: deferred  SKIN: no rashes  RECTAL: deferred, not indicated  BREAST: deferred    A/P:    1. Vertigo likely BPPV  improving  Meclizine prn  Neuro f/u as outpt    2. Htn: stable    3. r/o TIA:  MRI negative    50% Right carotid stenosis: cw ASA/Statins    d/c home, time 37min

## 2019-01-09 NOTE — SWALLOW BEDSIDE ASSESSMENT ADULT - COMMENTS
The patient was admitted to  with feeling lightheaded and dizziness. Imaging notable for MI stenosis. Question if pt with vertigo or s/p a TIA. Neuro is following. This profile is superimposed upon a history of hypertension. This patient was admitted to  with feeling lightheaded and dizziness. Imaging notable for MI stenosis. Question if pt with vertigo or s/p a TIA. Neuro is following. This profile is superimposed upon a history of hypertension.

## 2019-01-09 NOTE — DISCHARGE NOTE ADULT - PATIENT PORTAL LINK FT
You can access the [facility] Patient Portal, offered by [facility], by registering with the following website: [email or web address]

## 2019-01-09 NOTE — SWALLOW BEDSIDE ASSESSMENT ADULT - SWALLOW EVAL: RECOMMENDED DIET
SUGGEST A REGULAR TEXTURE DIET WITH THIN LIQUID CONSISTENCIES AS SHE TOLERATES THE SAME FROM AN OROPHARYNGEAL SWALLOWING STANCE ON CLINICAL EXAM.

## 2019-01-09 NOTE — DISCHARGE NOTE ADULT - MEDICATION SUMMARY - MEDICATIONS TO TAKE
I will START or STAY ON the medications listed below when I get home from the hospital:    aspirin 81 mg oral tablet  -- 1 tab(s) by mouth once a day  -- Indication: For HTN (hypertension)    losartan 100 mg oral tablet  -- 1 tab(s) by mouth once a day  -- Indication: For HTN (hypertension)    meclizine 25 mg oral tablet  -- 1 tab(s) by mouth every 8 hours, As needed, Dizziness  -- Indication: For vertigo    atorvastatin 20 mg oral tablet  -- 1 tab(s) by mouth once a day (at bedtime)  -- Indication: For Hyperlipedimia    Metoprolol Succinate  mg oral tablet, extended release  -- 1 tab(s) by mouth once a day  -- Indication: For HTN (hypertension) I will START or STAY ON the medications listed below when I get home from the hospital:    aspirin 81 mg oral tablet  -- 1 tab(s) by mouth once a day  -- Indication: For HTN (hypertension)    losartan 100 mg oral tablet  -- 1 tab(s) by mouth once a day  -- Indication: For HTN (hypertension)    meclizine 25 mg oral tablet  -- 1 tab(s) by mouth every 8 hours, As needed, Dizziness  -- Indication: For Vertigo, benign positional, unspecified laterality    atorvastatin 20 mg oral tablet  -- 1 tab(s) by mouth once a day (at bedtime)  -- Indication: For cholesterol plaques    Metoprolol Succinate  mg oral tablet, extended release  -- 1 tab(s) by mouth once a day  -- Indication: For HTN (hypertension)

## 2019-01-09 NOTE — DISCHARGE NOTE ADULT - CARE PLAN
Principal Discharge DX:	TIA (transient ischemic attack)  Goal:	feel well  Assessment and plan of treatment:	take meds as instructed  Secondary Diagnosis:	Vertigo, benign positional, unspecified laterality  Goal:	feel well

## 2019-01-09 NOTE — PROGRESS NOTE ADULT - SUBJECTIVE AND OBJECTIVE BOX
Pt's vertigo and dizziness has resolved. Is ambulating, MRI brain shows no acute infarct, denies dysarthria, paresthesia, focal weakness, facial droop, visual blurring, headache.     ROS: COUGH +, all other ROS were reviewed and are negative.      MEDICATIONS  (STANDING):  aspirin enteric coated 81 milliGRAM(s) Oral daily  atorvastatin 20 milliGRAM(s) Oral at bedtime  enoxaparin Injectable 40 milliGRAM(s) SubCutaneous every 24 hours  losartan 100 milliGRAM(s) Oral daily  metoprolol succinate  milliGRAM(s) Oral daily      Vital Signs Last 24 Hrs  T(C): 36.9 (09 Jan 2019 11:08), Max: 36.9 (09 Jan 2019 05:29)  T(F): 98.5 (09 Jan 2019 11:08), Max: 98.5 (09 Jan 2019 11:08)  HR: 59 (09 Jan 2019 11:08) (59 - 62)  BP: 103/48 (09 Jan 2019 11:08) (103/48 - 158/75)  BP(mean): --  RR: 18 (09 Jan 2019 11:08) (16 - 18)  SpO2: 98% (09 Jan 2019 11:08) (65% - 98%)     Neurological exam:  HF: A x O x 3. Appropriately interactive, normal affect. Speech fluent, No Aphasia or paraphasic errors. Naming /repetition intact   CN: RONALD, EOMI, VFF, facial sensation normal, no NLFD, tongue midline, Palate moves equally, SCM equal bilaterally  Motor: No pronator drift, Strength 5/5 in all 4 ext, normal bulk and tone, no tremor, rigidity .    Sens: Intact to light touch     Reflexes: Symmetric 2+, AJ 0, downgoing toes b/l  Coord:  No FNFA, dysmetria,   Gait/Balance: Normal                       12.0   10.71 )-----------( 180      ( 09 Jan 2019 06:04 )             37.2     01-09    143  |  111<H>  |  22  ----------------------------<  112<H>  4.3   |  27  |  0.66    Ca    8.5      09 Jan 2019 06:04  Mg     2.1     01-08    TPro  7.2  /  Alb  3.7  /  TBili  0.4  /  DBili  x   /  AST  19  /  ALT  24  /  AlkPhos  78  01-08 01-09 AhmvxskuohH7C 6.0    01-09 Chol 135 LDL 77 HDL 37<L> Trig 105    Radiology report:  - MRI brain: < from: MR Head No Cont (01.09.19 @ 09:54) >   No acute infarct. Scattered small foci of T2/FLAIR   hyperintensity in the periventricular and subcortical white matter,   suggestive of mild chronic microvascular ischemic changes..      CTA:   1.   Right carotid system:   Focal plaque at the bifurcation with   focal moderate (50%) stenosis at the origin of the RIGHT internal carotid   artery although visualization is hindered by patient motion at this   level.              2.   Left carotid system:  Nohemodynamically significant stenosis.           3.   Intracranial circulation:  No significant vascular lesion.             4.   Brain:  No significant lesion identified.          < end of copied text >

## 2019-01-09 NOTE — SWALLOW BEDSIDE ASSESSMENT ADULT - SWALLOW EVAL: DIAGNOSIS
1) The pt exhibits Oropharyngeal Swallowing abilities which subjectively appear to be stable and within functional parameters for age. NO behavioral aspiration signs exhibited on exam. Odynophagia was denied.   2) The pt was alert and interactive. She was oriented x3+ and able to verbalize during communicative probes as well as in conversation. At these times, her motor speech abilities were functional, her speech output was intelligible and her utterances were linguistically intact/contextually appropriate. The pt was able to effectively verbalize her needs and is at reported communicative baseline.

## 2019-01-09 NOTE — SWALLOW BEDSIDE ASSESSMENT ADULT - SWALLOW EVAL: CRITERIA FOR SKILLED INTERVENTION MET
ACUTE SPEECH PATHOLOGY INTERVENTION IS NOT CLINICALLY WRRANTED. HER SPEECH-LANGUAGE AND OROPHARYNGEAL SWALLOWING ABILITIES WERE FELT TO BE WITHIN FUNCTIONAL LIMITS. GIVEN ABOVE, WILL NOT ACTIVELY FOLLOW. RECONSULT PRN.

## 2019-01-14 ENCOUNTER — APPOINTMENT (OUTPATIENT)
Dept: OBGYN | Facility: CLINIC | Age: 74
End: 2019-01-14

## 2019-01-14 PROBLEM — I10 ESSENTIAL (PRIMARY) HYPERTENSION: Chronic | Status: ACTIVE | Noted: 2019-01-08

## 2019-01-15 DIAGNOSIS — F17.210 NICOTINE DEPENDENCE, CIGARETTES, UNCOMPLICATED: ICD-10-CM

## 2019-01-15 DIAGNOSIS — I65.21 OCCLUSION AND STENOSIS OF RIGHT CAROTID ARTERY: ICD-10-CM

## 2019-01-15 DIAGNOSIS — E78.2 MIXED HYPERLIPIDEMIA: ICD-10-CM

## 2019-01-15 DIAGNOSIS — H81.10 BENIGN PAROXYSMAL VERTIGO, UNSPECIFIED EAR: ICD-10-CM

## 2019-01-15 DIAGNOSIS — G45.9 TRANSIENT CEREBRAL ISCHEMIC ATTACK, UNSPECIFIED: ICD-10-CM

## 2019-01-15 DIAGNOSIS — R42 DIZZINESS AND GIDDINESS: ICD-10-CM

## 2019-01-15 DIAGNOSIS — I10 ESSENTIAL (PRIMARY) HYPERTENSION: ICD-10-CM

## 2019-01-15 DIAGNOSIS — Z79.82 LONG TERM (CURRENT) USE OF ASPIRIN: ICD-10-CM

## 2019-01-25 ENCOUNTER — APPOINTMENT (OUTPATIENT)
Dept: OBGYN | Facility: CLINIC | Age: 74
End: 2019-01-25
Payer: MEDICARE

## 2019-01-25 VITALS
HEIGHT: 64 IN | SYSTOLIC BLOOD PRESSURE: 130 MMHG | BODY MASS INDEX: 29.71 KG/M2 | DIASTOLIC BLOOD PRESSURE: 70 MMHG | WEIGHT: 174 LBS

## 2019-01-25 PROCEDURE — G0101: CPT

## 2019-01-25 RX ORDER — DOXYCYCLINE HYCLATE 100 MG/1
100 TABLET ORAL
Qty: 20 | Refills: 0 | Status: COMPLETED | COMMUNITY
Start: 2019-01-15

## 2019-01-25 NOTE — PHYSICAL EXAM
[Awake] : awake [Alert] : alert [Acute Distress] : no acute distress [LAD] : no lymphadenopathy [Thyroid Nodule] : no thyroid nodule [Goiter] : no goiter [Mass] : no breast mass [Nipple Discharge] : no nipple discharge [Axillary LAD] : no axillary lymphadenopathy [Soft] : soft [Tender] : non tender [Distended] : not distended [H/Smegaly] : no hepatosplenomegaly [Oriented x3] : oriented to person, place, and time [Vulvar Atrophy] : vulvar atrophy [Normal] : uterus [Atrophy] : atrophy [No Bleeding] : there was no active vaginal bleeding [Pap Obtained] : a Pap smear was not performed [Motion Tenderness] : there was no cervical motion tenderness [Normal Position] : in a normal position [Tenderness] : nontender [Enlarged ___ wks] : not enlarged [Mass ___ cm] : no uterine mass was palpated [Uterine Adnexae] : were not tender and not enlarged [Adnexa Tenderness] : were not tender [Ovarian Mass (___ Cm)] : there were no adnexal masses [No Tenderness] : no rectal tenderness [Occult Blood] : occult blood test from digital rectal exam was negative [RRR, No Murmurs] : RRR, no murmurs [CTAB] : CTAB

## 2019-01-25 NOTE — HISTORY OF PRESENT ILLNESS
[1 Year Ago] : 1 year ago [Last Mammogram ___] : Last Mammogram was [unfilled] [Last Bone Density ___] : Last bone density studies [unfilled] [Last Pap ___] : Last cervical pap smear was [unfilled] [Postmenopausal] : is postmenopausal [Last Colonoscopy ___] : Last colonoscopy [unfilled] [Sexually Active] : is not sexually active

## 2019-01-29 ENCOUNTER — FORM ENCOUNTER (OUTPATIENT)
Age: 74
End: 2019-01-29

## 2019-01-30 ENCOUNTER — APPOINTMENT (OUTPATIENT)
Dept: MAMMOGRAPHY | Facility: CLINIC | Age: 74
End: 2019-01-30
Payer: MEDICARE

## 2019-01-30 ENCOUNTER — OUTPATIENT (OUTPATIENT)
Dept: OUTPATIENT SERVICES | Facility: HOSPITAL | Age: 74
LOS: 1 days | End: 2019-01-30
Payer: MEDICARE

## 2019-01-30 DIAGNOSIS — Z00.8 ENCOUNTER FOR OTHER GENERAL EXAMINATION: ICD-10-CM

## 2019-01-30 PROCEDURE — 77067 SCR MAMMO BI INCL CAD: CPT

## 2019-01-30 PROCEDURE — 77063 BREAST TOMOSYNTHESIS BI: CPT

## 2019-01-30 PROCEDURE — 77067 SCR MAMMO BI INCL CAD: CPT | Mod: 26

## 2019-01-30 PROCEDURE — 77063 BREAST TOMOSYNTHESIS BI: CPT | Mod: 26

## 2019-03-11 ENCOUNTER — EMERGENCY (EMERGENCY)
Facility: HOSPITAL | Age: 74
LOS: 0 days | Discharge: HOME | End: 2019-03-11
Attending: EMERGENCY MEDICINE | Admitting: EMERGENCY MEDICINE
Payer: MEDICARE

## 2019-03-11 VITALS
RESPIRATION RATE: 17 BRPM | OXYGEN SATURATION: 100 % | TEMPERATURE: 98 F | SYSTOLIC BLOOD PRESSURE: 156 MMHG | HEART RATE: 73 BPM | DIASTOLIC BLOOD PRESSURE: 79 MMHG

## 2019-03-11 VITALS
TEMPERATURE: 97 F | HEART RATE: 68 BPM | OXYGEN SATURATION: 95 % | RESPIRATION RATE: 20 BRPM | WEIGHT: 173.94 LBS | DIASTOLIC BLOOD PRESSURE: 74 MMHG | SYSTOLIC BLOOD PRESSURE: 134 MMHG

## 2019-03-11 DIAGNOSIS — R10.9 UNSPECIFIED ABDOMINAL PAIN: ICD-10-CM

## 2019-03-11 DIAGNOSIS — I10 ESSENTIAL (PRIMARY) HYPERTENSION: ICD-10-CM

## 2019-03-11 DIAGNOSIS — Z79.82 LONG TERM (CURRENT) USE OF ASPIRIN: ICD-10-CM

## 2019-03-11 LAB
ALBUMIN SERPL ELPH-MCNC: 3.6 G/DL — SIGNIFICANT CHANGE UP (ref 3.3–5)
ALP SERPL-CCNC: 68 U/L — SIGNIFICANT CHANGE UP (ref 40–120)
ALT FLD-CCNC: 26 U/L — SIGNIFICANT CHANGE UP (ref 12–78)
ANION GAP SERPL CALC-SCNC: 9 MMOL/L — SIGNIFICANT CHANGE UP (ref 5–17)
APTT BLD: 28.2 SEC — SIGNIFICANT CHANGE UP (ref 27.5–36.3)
AST SERPL-CCNC: 30 U/L — SIGNIFICANT CHANGE UP (ref 15–37)
BASOPHILS # BLD AUTO: 0.03 K/UL — SIGNIFICANT CHANGE UP (ref 0–0.2)
BASOPHILS NFR BLD AUTO: 0.4 % — SIGNIFICANT CHANGE UP (ref 0–2)
BILIRUB SERPL-MCNC: 0.8 MG/DL — SIGNIFICANT CHANGE UP (ref 0.2–1.2)
BUN SERPL-MCNC: 16 MG/DL — SIGNIFICANT CHANGE UP (ref 7–23)
CALCIUM SERPL-MCNC: 8.6 MG/DL — SIGNIFICANT CHANGE UP (ref 8.5–10.1)
CHLORIDE SERPL-SCNC: 105 MMOL/L — SIGNIFICANT CHANGE UP (ref 96–108)
CO2 SERPL-SCNC: 23 MMOL/L — SIGNIFICANT CHANGE UP (ref 22–31)
CREAT SERPL-MCNC: 1.22 MG/DL — SIGNIFICANT CHANGE UP (ref 0.5–1.3)
EOSINOPHIL # BLD AUTO: 0.09 K/UL — SIGNIFICANT CHANGE UP (ref 0–0.5)
EOSINOPHIL NFR BLD AUTO: 1.3 % — SIGNIFICANT CHANGE UP (ref 0–6)
GLUCOSE SERPL-MCNC: 170 MG/DL — HIGH (ref 70–99)
HCT VFR BLD CALC: 37.6 % — SIGNIFICANT CHANGE UP (ref 34.5–45)
HGB BLD-MCNC: 12.9 G/DL — SIGNIFICANT CHANGE UP (ref 11.5–15.5)
IMM GRANULOCYTES NFR BLD AUTO: 0.3 % — SIGNIFICANT CHANGE UP (ref 0–1.5)
INR BLD: 1.1 RATIO — SIGNIFICANT CHANGE UP (ref 0.88–1.16)
LIDOCAIN IGE QN: 70 U/L — LOW (ref 73–393)
LYMPHOCYTES # BLD AUTO: 1.75 K/UL — SIGNIFICANT CHANGE UP (ref 1–3.3)
LYMPHOCYTES # BLD AUTO: 24.6 % — SIGNIFICANT CHANGE UP (ref 13–44)
MCHC RBC-ENTMCNC: 30.2 PG — SIGNIFICANT CHANGE UP (ref 27–34)
MCHC RBC-ENTMCNC: 34.3 GM/DL — SIGNIFICANT CHANGE UP (ref 32–36)
MCV RBC AUTO: 88.1 FL — SIGNIFICANT CHANGE UP (ref 80–100)
MONOCYTES # BLD AUTO: 0.91 K/UL — HIGH (ref 0–0.9)
MONOCYTES NFR BLD AUTO: 12.8 % — SIGNIFICANT CHANGE UP (ref 2–14)
NEUTROPHILS # BLD AUTO: 4.3 K/UL — SIGNIFICANT CHANGE UP (ref 1.8–7.4)
NEUTROPHILS NFR BLD AUTO: 60.6 % — SIGNIFICANT CHANGE UP (ref 43–77)
NRBC # BLD: 0 /100 WBCS — SIGNIFICANT CHANGE UP (ref 0–0)
PLATELET # BLD AUTO: 170 K/UL — SIGNIFICANT CHANGE UP (ref 150–400)
POTASSIUM SERPL-MCNC: 4 MMOL/L — SIGNIFICANT CHANGE UP (ref 3.5–5.3)
POTASSIUM SERPL-SCNC: 4 MMOL/L — SIGNIFICANT CHANGE UP (ref 3.5–5.3)
PROT SERPL-MCNC: 7 GM/DL — SIGNIFICANT CHANGE UP (ref 6–8.3)
PROTHROM AB SERPL-ACNC: 12.3 SEC — SIGNIFICANT CHANGE UP (ref 10–12.9)
RBC # BLD: 4.27 M/UL — SIGNIFICANT CHANGE UP (ref 3.8–5.2)
RBC # FLD: 13.4 % — SIGNIFICANT CHANGE UP (ref 10.3–14.5)
SODIUM SERPL-SCNC: 137 MMOL/L — SIGNIFICANT CHANGE UP (ref 135–145)
WBC # BLD: 7.1 K/UL — SIGNIFICANT CHANGE UP (ref 3.8–10.5)
WBC # FLD AUTO: 7.1 K/UL — SIGNIFICANT CHANGE UP (ref 3.8–10.5)

## 2019-03-11 PROCEDURE — 99285 EMERGENCY DEPT VISIT HI MDM: CPT

## 2019-03-11 PROCEDURE — 74177 CT ABD & PELVIS W/CONTRAST: CPT | Mod: 26

## 2019-03-11 PROCEDURE — 71045 X-RAY EXAM CHEST 1 VIEW: CPT | Mod: 26

## 2019-03-11 RX ORDER — IPRATROPIUM/ALBUTEROL SULFATE 18-103MCG
3 AEROSOL WITH ADAPTER (GRAM) INHALATION ONCE
Qty: 0 | Refills: 0 | Status: COMPLETED | OUTPATIENT
Start: 2019-03-11 | End: 2019-03-11

## 2019-03-11 RX ORDER — ONDANSETRON 8 MG/1
4 TABLET, FILM COATED ORAL ONCE
Qty: 0 | Refills: 0 | Status: COMPLETED | OUTPATIENT
Start: 2019-03-11 | End: 2019-03-11

## 2019-03-11 RX ORDER — MORPHINE SULFATE 50 MG/1
4 CAPSULE, EXTENDED RELEASE ORAL ONCE
Qty: 0 | Refills: 0 | Status: DISCONTINUED | OUTPATIENT
Start: 2019-03-11 | End: 2019-03-11

## 2019-03-11 RX ORDER — SODIUM CHLORIDE 9 MG/ML
1000 INJECTION INTRAMUSCULAR; INTRAVENOUS; SUBCUTANEOUS ONCE
Qty: 0 | Refills: 0 | Status: COMPLETED | OUTPATIENT
Start: 2019-03-11 | End: 2019-03-11

## 2019-03-11 RX ORDER — SODIUM CHLORIDE 9 MG/ML
1000 INJECTION, SOLUTION INTRAVENOUS
Qty: 0 | Refills: 0 | Status: DISCONTINUED | OUTPATIENT
Start: 2019-03-11 | End: 2019-03-11

## 2019-03-11 RX ORDER — SUCRALFATE 1 G
1 TABLET ORAL ONCE
Qty: 0 | Refills: 0 | Status: COMPLETED | OUTPATIENT
Start: 2019-03-11 | End: 2019-03-11

## 2019-03-11 RX ORDER — FAMOTIDINE 10 MG/ML
20 INJECTION INTRAVENOUS ONCE
Qty: 0 | Refills: 0 | Status: COMPLETED | OUTPATIENT
Start: 2019-03-11 | End: 2019-03-11

## 2019-03-11 RX ORDER — ONDANSETRON 8 MG/1
1 TABLET, FILM COATED ORAL
Qty: 10 | Refills: 0
Start: 2019-03-11

## 2019-03-11 RX ADMIN — FAMOTIDINE 20 MILLIGRAM(S): 10 INJECTION INTRAVENOUS at 16:46

## 2019-03-11 RX ADMIN — ONDANSETRON 4 MILLIGRAM(S): 8 TABLET, FILM COATED ORAL at 17:55

## 2019-03-11 RX ADMIN — SODIUM CHLORIDE 500 MILLILITER(S): 9 INJECTION, SOLUTION INTRAVENOUS at 13:51

## 2019-03-11 RX ADMIN — Medication 3 MILLILITER(S): at 13:12

## 2019-03-11 RX ADMIN — SODIUM CHLORIDE 1000 MILLILITER(S): 9 INJECTION INTRAMUSCULAR; INTRAVENOUS; SUBCUTANEOUS at 10:31

## 2019-03-11 RX ADMIN — ONDANSETRON 4 MILLIGRAM(S): 8 TABLET, FILM COATED ORAL at 10:59

## 2019-03-11 RX ADMIN — SODIUM CHLORIDE 1000 MILLILITER(S): 9 INJECTION INTRAMUSCULAR; INTRAVENOUS; SUBCUTANEOUS at 11:31

## 2019-03-11 RX ADMIN — ONDANSETRON 4 MILLIGRAM(S): 8 TABLET, FILM COATED ORAL at 15:41

## 2019-03-11 RX ADMIN — Medication 20 MILLIGRAM(S): at 11:35

## 2019-03-11 NOTE — ED PROVIDER NOTE - PROGRESS NOTE DETAILS
Patient with normal labs. Continues with intermittent crampy lower abdominal pain, mild associated nausea.  No urinary c/o.  Abdomen remains benign.  Provided tolerating PO -- would d/c with symptomatic tx (zofran prn, bland diet)

## 2019-03-11 NOTE — ED PROVIDER NOTE - NSFOLLOWUPINSTRUCTIONS_ED_ALL_ED_FT
[place] diet.  Follow-up with your regular physician and/or a GI specialist (see Dr. Marte's information above)  Return with persistent/worsening symptoms    Abdominal Pain    Many things can cause abdominal pain. Many times, abdominal pain is not caused by a disease and will improve without treatment. Your health care provider will do a physical exam to determine if there is a dangerous cause of your pain; blood tests and imaging may help determine the cause of your pain. However, in many cases, no cause may be found and you may need further testing as an outpatient. Monitor your abdominal pain for any changes.     SEEK IMMEDIATE MEDICAL CARE IF YOU HAVE ANY OF THE FOLLOWING SYMPTOMS: worsening abdominal pain, uncontrollable vomiting, profuse diarrhea, inability to have bowel movements or pass gas, black or bloody stools, fever accompanying chest pain or back pain, or fainting. These symptoms may represent a serious problem that is an emergency. Do not wait to see if the symptoms will go away. Get medical help right away. Call 911 and do not drive yourself to the hospital.

## 2019-03-11 NOTE — ED PROVIDER NOTE - OBJECTIVE STATEMENT
74 y/o F with PMHx of HTN presenting to the ED c/o abdominal pain. Pt notes that she has been taking Azithromycin 3 days ago with a decreased appetite, due to diagnosed Bronchitis. Pt had manriquez with breakfast this morning and developed stomach pains shortly thereafter prompting ED visit. Pt notes that after abdominal pains began, she started sweating and became nauseous. While in ED, pt notes that pain has slightly subsided but nausea has not. +Smoker.

## 2019-03-11 NOTE — ED PROVIDER NOTE - CARE PROVIDER_API CALL
Angelo Marte)  Gastroenterology; Internal Medicine  [address] [place] [address]  Phone: [phone]  Fax: [phone]  Follow Up Time:

## 2019-03-11 NOTE — ED PROVIDER NOTE - CLINICAL SUMMARY MEDICAL DECISION MAKING FREE TEXT BOX
72 y/o F with PMHx of HTN presenting to the ED c/o abdominal pain. Plan: fluids, labs, chest XR, Zofran, reassess.

## 2019-03-11 NOTE — ED ADULT TRIAGE NOTE - CHIEF COMPLAINT QUOTE
Pt complains of "terrible" abdominal pain with nausea and vomiting after eating manriquez this morning.  Pt currently taking erythromycin for URI.

## 2019-03-11 NOTE — ED PROVIDER NOTE - CONSTITUTIONAL, MLM
normal... Well appearing, well nourished, awake, alert, oriented to person, place, time/situation. +In mild apparent distress.

## 2019-03-11 NOTE — ED ADULT NURSE NOTE - OBJECTIVE STATEMENT
c/o abdominal pain , nausea , since this am ,  pt has bronchitis on erythromycin , pt reports pain began after eating manriquez

## 2019-05-20 ENCOUNTER — APPOINTMENT (OUTPATIENT)
Dept: DERMATOLOGY | Facility: CLINIC | Age: 74
End: 2019-05-20
Payer: MEDICARE

## 2019-05-20 PROCEDURE — 99214 OFFICE O/P EST MOD 30 MIN: CPT

## 2019-05-20 RX ORDER — TRIAMCINOLONE ACETONIDE 1 MG/G
0.1 OINTMENT TOPICAL TWICE DAILY
Qty: 120 | Refills: 2 | Status: DISCONTINUED | COMMUNITY
Start: 2018-05-21 | End: 2019-05-20

## 2019-07-23 ENCOUNTER — APPOINTMENT (OUTPATIENT)
Dept: DERMATOLOGY | Facility: CLINIC | Age: 74
End: 2019-07-23
Payer: MEDICARE

## 2019-07-23 DIAGNOSIS — L82.1 OTHER SEBORRHEIC KERATOSIS: ICD-10-CM

## 2019-07-23 PROCEDURE — 99213 OFFICE O/P EST LOW 20 MIN: CPT

## 2019-08-13 ENCOUNTER — APPOINTMENT (OUTPATIENT)
Dept: DERMATOLOGY | Facility: CLINIC | Age: 74
End: 2019-08-13

## 2019-09-04 NOTE — ED ADULT NURSE NOTE - MODE OF DISCHARGE
[facility]  [facility] [address] [address]    Name: Ivan Sanchez   YOB: 2008   Age: 11 year old  Duration: 60 minutes.  Clinician:   Maya Fong PSYD    Diagnosis: Autism Spectrum Disorder  Problem list:   Patient Active Problem List   Diagnosis   • ADHD (attention deficit hyperactivity disorder), combined type   • Autistic disorder   • Transient tics        Reason for Visit:  Therapy for treatment of behaviors associated with diagnosis.    Type of Session:  Clinic Session    SUBJECTIVE/OBJECTIVE:  The following persons were present during the session: Ivan and patient's mother.         - present? No    Behavioral observations during the session showed the child to be anxious   , cooperative    and distracted   .     Treatment performed to address the functional limitations and to make progress towards specific goal areas, detailed below.    Functional Limitations: Child's functional limitations include: Arguing, social skills, transitioning and rigidity.       Treatment Modalities: Treatment modalities used in session included: parent training/ support .    Treatment Techniques: Treatment techniques included: discussion. Spoke with both mom and Ivan.    · Today was our first session of this module. First, we covered administrative related topics. Parent consented to treatment as well as the use of physical management and safety care as clinically appropriate. Specifically, the following were reviewed:    · Review confidentiality and limitations to confidentiality: (i.e. We are able to share information within advocate as it pertains to treatment without consent; We are mandated reporters and required to make a report regarding any instances or suspected instances of abuse or harm to self or others; We have a responsibility to respond to court-ordered requests; We can share information with other providers  as long as we obtain consent from parent/guardian.) Parent expressed understanding of topic, Parent agreed with information covered, Parent gave consent for child to be treated and Parent expressed understanding and agreement with terms of confidentiality  · [place] to [facility]: Review steps of program (intake, parent training, 12-16 weeks behavior therapy). Review strategies/interventions/physical management/Safety Care/theoretical orientations used and alternatives. Obtain consent for treatment within program parameters discussed. Parent expressed understanding of topic, Parent agreed with information covered and Parent gave consent for child to be treated  · Review cancellation/no show policy (i.e. Consistency is necessary in ensuring effectiveness of behavior therapy. Family is responsible for contacting therapist with any cancellations. Not contacting therapist will result in a no show appointment. After 3 cancellations, the family's current participation in behavior therapy will be discussed with therapist. After 2 no show appointments, the therapy module may be terminated.) Parent expressed understanding of topic, Parent agreed with information covered and Parent gave consent for child to be treated  · Review tardiness policy (i.e. Session consists of 45-50 minutes of therapy. Therapy may be cancelled if tardiness exceeds 20 minutes past the original appointment time.) Parent expressed understanding of topic and Parent agreed with information covered  · Review program requirements regarding level of aggression, severity of elopement behaviors, and parent involvement. Parent expressed understanding of topic and Parent agreed with information covered  · We spent the rest of session discussing parent’s main areas of concern around behavior and related background information.  · Areas of behavior concern include: Attention to tasks (i.e. medication helps but still has difficulty attending), Social skills (i.e.is  sensitive, doesn't understand jokes or slang; calling people names, polite), Transitioning (i.e. argues a lot during this time, zurdo if he doesn't finish something), and Rigidity (i.e. with food). Food has to be what he likes, only likes crunchy.  Does not like meat or sauces.  Eats cereal, fruit, yogurt (smooth), Cheezeits, Ritz crackers, no vegetables, chicken nuggets and fries.  Arguing is a main concern, wants to have the last word.  If mom stops he will continue to perseverate.  Arguing happens at school also.  He will call people names only when he is really angry. In school argues about finishing work, transitioning classes, clothing.  When he does not like something or it doesn't go his way he will argue.  At home, Ipad and TV argue.  Helps when you don't play into it.  · Parent also gave updates on Ivan Sanchez's progress and supports at school and in outpatient therapies.  Ivan told therapist he likes having reward time for when he is good (stay calm, focus, attend and no arguing). Reward time means computer.  Watches [employer] roxy amination person morph into other things. Enjoys watching puppet shows and production, likes lion althea. Having breaks is easy like recess, the work is difficult.   In special needs class STEP program. 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 Class is working great, teacher and teachers aid.  Mainstream PE, music and art with an aid. Learning and understanding well, struggles with reading and writing. Sitting is difficult. IEP in place.  Does well with ADL's very independent.   Ivan was able to identify friends he has a school.    Likes Niranjan, puppets, stickbots, making animation.  Plays tag, [employer] soccer, good at skating.  School uses visuals to help him. Social stories at used.  ASSESSMENT:   Treatment plan being developed      PLAN: Continue Therapy 1 times per week to address the current goals as stated in the Individual Treatment Plan.      OTHER  RECOMMENDATIONS:  · Guardian:   · Observation: Bring activities/toys to next session so therapist can observe situations parent described.     Ambulatory

## 2019-09-20 ENCOUNTER — EMERGENCY (EMERGENCY)
Facility: HOSPITAL | Age: 74
LOS: 0 days | Discharge: HOME | End: 2019-09-20
Attending: HOSPITALIST
Payer: MEDICARE

## 2019-09-20 VITALS
SYSTOLIC BLOOD PRESSURE: 165 MMHG | HEART RATE: 67 BPM | DIASTOLIC BLOOD PRESSURE: 77 MMHG | OXYGEN SATURATION: 99 % | TEMPERATURE: 99 F | RESPIRATION RATE: 17 BRPM

## 2019-09-20 VITALS — WEIGHT: 179.9 LBS | HEIGHT: 64 IN

## 2019-09-20 DIAGNOSIS — I10 ESSENTIAL (PRIMARY) HYPERTENSION: ICD-10-CM

## 2019-09-20 DIAGNOSIS — Z79.899 OTHER LONG TERM (CURRENT) DRUG THERAPY: ICD-10-CM

## 2019-09-20 DIAGNOSIS — Z79.82 LONG TERM (CURRENT) USE OF ASPIRIN: ICD-10-CM

## 2019-09-20 DIAGNOSIS — R42 DIZZINESS AND GIDDINESS: ICD-10-CM

## 2019-09-20 LAB
ALBUMIN SERPL ELPH-MCNC: 4 G/DL — SIGNIFICANT CHANGE UP (ref 3.3–5)
ALP SERPL-CCNC: 101 U/L — SIGNIFICANT CHANGE UP (ref 40–120)
ALT FLD-CCNC: 24 U/L — SIGNIFICANT CHANGE UP (ref 12–78)
ANION GAP SERPL CALC-SCNC: 6 MMOL/L — SIGNIFICANT CHANGE UP (ref 5–17)
APPEARANCE UR: CLEAR — SIGNIFICANT CHANGE UP
AST SERPL-CCNC: 16 U/L — SIGNIFICANT CHANGE UP (ref 15–37)
BASOPHILS # BLD AUTO: 0.05 K/UL — SIGNIFICANT CHANGE UP (ref 0–0.2)
BASOPHILS NFR BLD AUTO: 0.5 % — SIGNIFICANT CHANGE UP (ref 0–2)
BILIRUB SERPL-MCNC: 0.4 MG/DL — SIGNIFICANT CHANGE UP (ref 0.2–1.2)
BILIRUB UR-MCNC: NEGATIVE — SIGNIFICANT CHANGE UP
BUN SERPL-MCNC: 22 MG/DL — SIGNIFICANT CHANGE UP (ref 7–23)
CALCIUM SERPL-MCNC: 9.3 MG/DL — SIGNIFICANT CHANGE UP (ref 8.5–10.1)
CHLORIDE SERPL-SCNC: 106 MMOL/L — SIGNIFICANT CHANGE UP (ref 96–108)
CO2 SERPL-SCNC: 28 MMOL/L — SIGNIFICANT CHANGE UP (ref 22–31)
COLOR SPEC: YELLOW — SIGNIFICANT CHANGE UP
CREAT SERPL-MCNC: 0.79 MG/DL — SIGNIFICANT CHANGE UP (ref 0.5–1.3)
DIFF PNL FLD: ABNORMAL
EOSINOPHIL # BLD AUTO: 0.09 K/UL — SIGNIFICANT CHANGE UP (ref 0–0.5)
EOSINOPHIL NFR BLD AUTO: 0.9 % — SIGNIFICANT CHANGE UP (ref 0–6)
GLUCOSE SERPL-MCNC: 103 MG/DL — HIGH (ref 70–99)
GLUCOSE UR QL: NEGATIVE MG/DL — SIGNIFICANT CHANGE UP
HCT VFR BLD CALC: 38.1 % — SIGNIFICANT CHANGE UP (ref 34.5–45)
HGB BLD-MCNC: 12.5 G/DL — SIGNIFICANT CHANGE UP (ref 11.5–15.5)
IMM GRANULOCYTES NFR BLD AUTO: 0.3 % — SIGNIFICANT CHANGE UP (ref 0–1.5)
KETONES UR-MCNC: NEGATIVE — SIGNIFICANT CHANGE UP
LEUKOCYTE ESTERASE UR-ACNC: NEGATIVE — SIGNIFICANT CHANGE UP
LYMPHOCYTES # BLD AUTO: 2.86 K/UL — SIGNIFICANT CHANGE UP (ref 1–3.3)
LYMPHOCYTES # BLD AUTO: 27.7 % — SIGNIFICANT CHANGE UP (ref 13–44)
MCHC RBC-ENTMCNC: 29.8 PG — SIGNIFICANT CHANGE UP (ref 27–34)
MCHC RBC-ENTMCNC: 32.8 GM/DL — SIGNIFICANT CHANGE UP (ref 32–36)
MCV RBC AUTO: 90.7 FL — SIGNIFICANT CHANGE UP (ref 80–100)
MONOCYTES # BLD AUTO: 0.71 K/UL — SIGNIFICANT CHANGE UP (ref 0–0.9)
MONOCYTES NFR BLD AUTO: 6.9 % — SIGNIFICANT CHANGE UP (ref 2–14)
NEUTROPHILS # BLD AUTO: 6.6 K/UL — SIGNIFICANT CHANGE UP (ref 1.8–7.4)
NEUTROPHILS NFR BLD AUTO: 63.7 % — SIGNIFICANT CHANGE UP (ref 43–77)
NITRITE UR-MCNC: NEGATIVE — SIGNIFICANT CHANGE UP
PH UR: 6.5 — SIGNIFICANT CHANGE UP (ref 5–8)
PLATELET # BLD AUTO: 200 K/UL — SIGNIFICANT CHANGE UP (ref 150–400)
POTASSIUM SERPL-MCNC: 3.9 MMOL/L — SIGNIFICANT CHANGE UP (ref 3.5–5.3)
POTASSIUM SERPL-SCNC: 3.9 MMOL/L — SIGNIFICANT CHANGE UP (ref 3.5–5.3)
PROT SERPL-MCNC: 7.8 GM/DL — SIGNIFICANT CHANGE UP (ref 6–8.3)
PROT UR-MCNC: NEGATIVE MG/DL — SIGNIFICANT CHANGE UP
RBC # BLD: 4.2 M/UL — SIGNIFICANT CHANGE UP (ref 3.8–5.2)
RBC # FLD: 13.5 % — SIGNIFICANT CHANGE UP (ref 10.3–14.5)
SODIUM SERPL-SCNC: 140 MMOL/L — SIGNIFICANT CHANGE UP (ref 135–145)
SP GR SPEC: 1.01 — SIGNIFICANT CHANGE UP (ref 1.01–1.02)
UROBILINOGEN FLD QL: NEGATIVE MG/DL — SIGNIFICANT CHANGE UP
WBC # BLD: 10.34 K/UL — SIGNIFICANT CHANGE UP (ref 3.8–10.5)
WBC # FLD AUTO: 10.34 K/UL — SIGNIFICANT CHANGE UP (ref 3.8–10.5)

## 2019-09-20 PROCEDURE — 71046 X-RAY EXAM CHEST 2 VIEWS: CPT | Mod: 26

## 2019-09-20 PROCEDURE — 87086 URINE CULTURE/COLONY COUNT: CPT

## 2019-09-20 PROCEDURE — 80053 COMPREHEN METABOLIC PANEL: CPT

## 2019-09-20 PROCEDURE — 71046 X-RAY EXAM CHEST 2 VIEWS: CPT

## 2019-09-20 PROCEDURE — 81001 URINALYSIS AUTO W/SCOPE: CPT

## 2019-09-20 PROCEDURE — 93005 ELECTROCARDIOGRAM TRACING: CPT

## 2019-09-20 PROCEDURE — 99283 EMERGENCY DEPT VISIT LOW MDM: CPT | Mod: 25

## 2019-09-20 PROCEDURE — 93010 ELECTROCARDIOGRAM REPORT: CPT

## 2019-09-20 PROCEDURE — 85025 COMPLETE CBC W/AUTO DIFF WBC: CPT

## 2019-09-20 PROCEDURE — 36415 COLL VENOUS BLD VENIPUNCTURE: CPT

## 2019-09-20 PROCEDURE — 99284 EMERGENCY DEPT VISIT MOD MDM: CPT

## 2019-09-20 NOTE — ED STATDOCS - PROGRESS NOTE DETAILS
72 y/o F presents with dizziness that started today. pt reports feeling dizzy when she turns her head left or right. She reports being congested the past few days, using flonase with minimal releif. Denies fever/chills, n/v/d, CP, SOB, abd pain, urinary sx or other complaints at this time.   ENT: No sinus tenderness. EAC clear B/L. Unable to visualize L tm due to cerumen impaction. Rtm wnl. Neuro: CN2-12 grossly. intact. motor and sensory intact. -Matteo Penn PA-C Results reviewed and discussed with pt. Pt reports feeling much better. Sx consistent with peripheral vertigo. Advised to continue using flonase and claritian, use debrox for cerumen impaction.  Discussed importance of close FU with PMD. Pt asked to return to ED immediately for any new or concerning sx or worsening. Pt acknowledges and understands plan -Matteo Penn PA-C

## 2019-09-20 NOTE — ED STATDOCS - CLINICAL SUMMARY MEDICAL DECISION MAKING FREE TEXT BOX
72 yo F with non specific complaints of generalized weakness and feeling not herself. WIll check labs, urine, EKG, CXR.

## 2019-09-20 NOTE — ED STATDOCS - NS_ ATTENDINGSCRIBEDETAILS _ED_A_ED_FT
Bhumi Gusman MD: The history, relevant review of systems, past medical and surgical history, medical decision making, and physical examination was documented by the scribe in my presence and I attest to the accuracy of the documentation.

## 2019-09-20 NOTE — ED STATDOCS - OBJECTIVE STATEMENT
Pt is a 72 y/o F, with PMHx of vertigo and HTN, presenting to the ED with c/o dizziness. Pt states she was feeling fine all morning but around midday she started feeling "not right." Pt describes feeling like a "wet noodle" and feels weak and unstable. Reports feeling like she is going to pass out but has not. Denies HA, CP, SOB, fever, cough, and N/V/D. Also developed some congestion today. Pt notes recently she has been having excess ear wax and was given drops for relief but admits she has not been taking it as directed.

## 2019-09-20 NOTE — ED ADULT NURSE NOTE - OBJECTIVE STATEMENT
Patient comes to ED for dizziness that began after a nap. pt reports turning over and felt room spinning. pt denies any dizziness at this time. Pt reports some nausea and "not feeling right".pt reports similar symptoms a few months ago and given Antivert which patient has not taken. pt denies any chest pain or SOB. Pt gait stead

## 2019-09-20 NOTE — ED STATDOCS - CARE PROVIDER_API CALL
Calin Marin)  Otolaryngology  [address] [place] [address]  Phone: [phone]  Fax: [phone]  Follow Up Time: 1-3 Days

## 2019-09-20 NOTE — ED STATDOCS - PATIENT PORTAL LINK FT
You can access the FollowMyHealth Patient Portal offered by [facility] by registering at the following website: [email or web address]. By joining [employer]’s FollowMyHealth portal, you will also be able to view your health information using other applications (apps) compatible with our system.

## 2019-09-20 NOTE — ED ADULT TRIAGE NOTE - CHIEF COMPLAINT QUOTE
Pt states she was feeling fine all day, took a nap and then just was not feeling "right". Nausea, dizziness and felt like her vertigo. Room spinning, got herself anxious and anxiety increased

## 2019-09-21 LAB
CULTURE RESULTS: SIGNIFICANT CHANGE UP
SPECIMEN SOURCE: SIGNIFICANT CHANGE UP

## 2020-01-21 ENCOUNTER — APPOINTMENT (OUTPATIENT)
Dept: DERMATOLOGY | Facility: CLINIC | Age: 75
End: 2020-01-21
Payer: MEDICARE

## 2020-01-21 PROCEDURE — 99213 OFFICE O/P EST LOW 20 MIN: CPT | Mod: 25

## 2020-01-21 PROCEDURE — 17000 DESTRUCT PREMALG LESION: CPT | Mod: 59

## 2020-01-21 PROCEDURE — 17110 DESTRUCTION B9 LES UP TO 14: CPT | Mod: 59

## 2020-01-21 PROCEDURE — 17003 DESTRUCT PREMALG LES 2-14: CPT | Mod: 59

## 2020-01-28 ENCOUNTER — APPOINTMENT (OUTPATIENT)
Dept: DERMATOLOGY | Facility: CLINIC | Age: 75
End: 2020-01-28
Payer: MEDICARE

## 2020-01-28 DIAGNOSIS — D49.89 NEOPLASM OF UNSPECIFIED BEHAVIOR OF OTHER SPECIFIED SITES: ICD-10-CM

## 2020-01-28 DIAGNOSIS — L91.8 OTHER HYPERTROPHIC DISORDERS OF THE SKIN: ICD-10-CM

## 2020-01-28 DIAGNOSIS — L82.0 INFLAMED SEBORRHEIC KERATOSIS: ICD-10-CM

## 2020-01-28 PROCEDURE — 17110 DESTRUCTION B9 LES UP TO 14: CPT | Mod: 59,79

## 2020-01-28 PROCEDURE — 11102 TANGNTL BX SKIN SINGLE LES: CPT | Mod: 59,79

## 2020-01-28 PROCEDURE — 99212 OFFICE O/P EST SF 10 MIN: CPT | Mod: 25

## 2020-01-28 PROCEDURE — 11103 TANGNTL BX SKIN EA SEP/ADDL: CPT | Mod: 59

## 2020-02-05 LAB — CORE LAB BIOPSY: NORMAL

## 2020-04-06 ENCOUNTER — APPOINTMENT (OUTPATIENT)
Dept: OBGYN | Facility: CLINIC | Age: 75
End: 2020-04-06

## 2020-05-18 ENCOUNTER — APPOINTMENT (OUTPATIENT)
Dept: DERMATOLOGY | Facility: CLINIC | Age: 75
End: 2020-05-18

## 2020-06-22 DIAGNOSIS — Z13.820 ENCOUNTER FOR SCREENING FOR OSTEOPOROSIS: ICD-10-CM

## 2020-07-08 ENCOUNTER — APPOINTMENT (OUTPATIENT)
Dept: OBGYN | Facility: CLINIC | Age: 75
End: 2020-07-08

## 2020-07-30 ENCOUNTER — RESULT REVIEW (OUTPATIENT)
Age: 75
End: 2020-07-30

## 2020-07-30 ENCOUNTER — APPOINTMENT (OUTPATIENT)
Dept: MAMMOGRAPHY | Facility: CLINIC | Age: 75
End: 2020-07-30
Payer: MEDICARE

## 2020-07-30 ENCOUNTER — OUTPATIENT (OUTPATIENT)
Dept: OUTPATIENT SERVICES | Facility: HOSPITAL | Age: 75
LOS: 1 days | End: 2020-07-30
Payer: MEDICARE

## 2020-07-30 ENCOUNTER — APPOINTMENT (OUTPATIENT)
Dept: RADIOLOGY | Facility: CLINIC | Age: 75
End: 2020-07-30
Payer: MEDICARE

## 2020-07-30 DIAGNOSIS — Z00.8 ENCOUNTER FOR OTHER GENERAL EXAMINATION: ICD-10-CM

## 2020-07-30 PROCEDURE — 77080 DXA BONE DENSITY AXIAL: CPT | Mod: 26

## 2020-07-30 PROCEDURE — 77067 SCR MAMMO BI INCL CAD: CPT | Mod: 26

## 2020-07-30 PROCEDURE — 77063 BREAST TOMOSYNTHESIS BI: CPT | Mod: 26

## 2020-07-30 PROCEDURE — 77067 SCR MAMMO BI INCL CAD: CPT

## 2020-07-30 PROCEDURE — 77063 BREAST TOMOSYNTHESIS BI: CPT

## 2020-07-30 PROCEDURE — 77080 DXA BONE DENSITY AXIAL: CPT

## 2020-08-28 ENCOUNTER — EMERGENCY (EMERGENCY)
Facility: HOSPITAL | Age: 75
LOS: 0 days | Discharge: HOME | End: 2020-08-28
Attending: EMERGENCY MEDICINE
Payer: MEDICARE

## 2020-08-28 VITALS
OXYGEN SATURATION: 99 % | DIASTOLIC BLOOD PRESSURE: 60 MMHG | HEART RATE: 87 BPM | RESPIRATION RATE: 18 BRPM | SYSTOLIC BLOOD PRESSURE: 137 MMHG

## 2020-08-28 VITALS
SYSTOLIC BLOOD PRESSURE: 156 MMHG | HEART RATE: 107 BPM | TEMPERATURE: 99 F | DIASTOLIC BLOOD PRESSURE: 65 MMHG | WEIGHT: 177.03 LBS | RESPIRATION RATE: 20 BRPM | HEIGHT: 64 IN

## 2020-08-28 DIAGNOSIS — R53.1 WEAKNESS: ICD-10-CM

## 2020-08-28 DIAGNOSIS — R10.9 UNSPECIFIED ABDOMINAL PAIN: ICD-10-CM

## 2020-08-28 DIAGNOSIS — R11.2 NAUSEA WITH VOMITING, UNSPECIFIED: ICD-10-CM

## 2020-08-28 LAB
ADD ON TEST-SPECIMEN IN LAB: SIGNIFICANT CHANGE UP
ALBUMIN SERPL ELPH-MCNC: 3.7 G/DL — SIGNIFICANT CHANGE UP (ref 3.3–5)
ALP SERPL-CCNC: 82 U/L — SIGNIFICANT CHANGE UP (ref 40–120)
ALT FLD-CCNC: 26 U/L — SIGNIFICANT CHANGE UP (ref 12–78)
ANION GAP SERPL CALC-SCNC: 8 MMOL/L — SIGNIFICANT CHANGE UP (ref 5–17)
APPEARANCE UR: CLEAR — SIGNIFICANT CHANGE UP
AST SERPL-CCNC: 14 U/L — LOW (ref 15–37)
BASOPHILS # BLD AUTO: 0.03 K/UL — SIGNIFICANT CHANGE UP (ref 0–0.2)
BASOPHILS NFR BLD AUTO: 0.4 % — SIGNIFICANT CHANGE UP (ref 0–2)
BILIRUB SERPL-MCNC: 0.7 MG/DL — SIGNIFICANT CHANGE UP (ref 0.2–1.2)
BILIRUB UR-MCNC: NEGATIVE — SIGNIFICANT CHANGE UP
BUN SERPL-MCNC: 18 MG/DL — SIGNIFICANT CHANGE UP (ref 7–23)
CALCIUM SERPL-MCNC: 9 MG/DL — SIGNIFICANT CHANGE UP (ref 8.5–10.1)
CHLORIDE SERPL-SCNC: 105 MMOL/L — SIGNIFICANT CHANGE UP (ref 96–108)
CO2 SERPL-SCNC: 24 MMOL/L — SIGNIFICANT CHANGE UP (ref 22–31)
COLOR SPEC: YELLOW — SIGNIFICANT CHANGE UP
CREAT SERPL-MCNC: 0.69 MG/DL — SIGNIFICANT CHANGE UP (ref 0.5–1.3)
DIFF PNL FLD: ABNORMAL
EOSINOPHIL # BLD AUTO: 0 K/UL — SIGNIFICANT CHANGE UP (ref 0–0.5)
EOSINOPHIL NFR BLD AUTO: 0 % — SIGNIFICANT CHANGE UP (ref 0–6)
GLUCOSE SERPL-MCNC: 129 MG/DL — HIGH (ref 70–99)
GLUCOSE UR QL: NEGATIVE MG/DL — SIGNIFICANT CHANGE UP
HCT VFR BLD CALC: 36.3 % — SIGNIFICANT CHANGE UP (ref 34.5–45)
HGB BLD-MCNC: 11.9 G/DL — SIGNIFICANT CHANGE UP (ref 11.5–15.5)
IMM GRANULOCYTES NFR BLD AUTO: 0.4 % — SIGNIFICANT CHANGE UP (ref 0–1.5)
KETONES UR-MCNC: ABNORMAL
LEUKOCYTE ESTERASE UR-ACNC: NEGATIVE — SIGNIFICANT CHANGE UP
LIDOCAIN IGE QN: 60 U/L — LOW (ref 73–393)
LYMPHOCYTES # BLD AUTO: 0.73 K/UL — LOW (ref 1–3.3)
LYMPHOCYTES # BLD AUTO: 8.8 % — LOW (ref 13–44)
MCHC RBC-ENTMCNC: 29.3 PG — SIGNIFICANT CHANGE UP (ref 27–34)
MCHC RBC-ENTMCNC: 32.8 GM/DL — SIGNIFICANT CHANGE UP (ref 32–36)
MCV RBC AUTO: 89.4 FL — SIGNIFICANT CHANGE UP (ref 80–100)
MONOCYTES # BLD AUTO: 0.66 K/UL — SIGNIFICANT CHANGE UP (ref 0–0.9)
MONOCYTES NFR BLD AUTO: 8 % — SIGNIFICANT CHANGE UP (ref 2–14)
NEUTROPHILS # BLD AUTO: 6.84 K/UL — SIGNIFICANT CHANGE UP (ref 1.8–7.4)
NEUTROPHILS NFR BLD AUTO: 82.4 % — HIGH (ref 43–77)
NITRITE UR-MCNC: NEGATIVE — SIGNIFICANT CHANGE UP
PH UR: 6 — SIGNIFICANT CHANGE UP (ref 5–8)
PLATELET # BLD AUTO: 177 K/UL — SIGNIFICANT CHANGE UP (ref 150–400)
POTASSIUM SERPL-MCNC: 3.7 MMOL/L — SIGNIFICANT CHANGE UP (ref 3.5–5.3)
POTASSIUM SERPL-SCNC: 3.7 MMOL/L — SIGNIFICANT CHANGE UP (ref 3.5–5.3)
PROT SERPL-MCNC: 7.5 GM/DL — SIGNIFICANT CHANGE UP (ref 6–8.3)
PROT UR-MCNC: NEGATIVE MG/DL — SIGNIFICANT CHANGE UP
RBC # BLD: 4.06 M/UL — SIGNIFICANT CHANGE UP (ref 3.8–5.2)
RBC # FLD: 13.5 % — SIGNIFICANT CHANGE UP (ref 10.3–14.5)
SODIUM SERPL-SCNC: 137 MMOL/L — SIGNIFICANT CHANGE UP (ref 135–145)
SP GR SPEC: 1.01 — SIGNIFICANT CHANGE UP (ref 1.01–1.02)
TROPONIN I SERPL-MCNC: <0.015 NG/ML — SIGNIFICANT CHANGE UP (ref 0.01–0.04)
UROBILINOGEN FLD QL: NEGATIVE MG/DL — SIGNIFICANT CHANGE UP
WBC # BLD: 8.29 K/UL — SIGNIFICANT CHANGE UP (ref 3.8–10.5)
WBC # FLD AUTO: 8.29 K/UL — SIGNIFICANT CHANGE UP (ref 3.8–10.5)

## 2020-08-28 PROCEDURE — 93010 ELECTROCARDIOGRAM REPORT: CPT

## 2020-08-28 PROCEDURE — 81001 URINALYSIS AUTO W/SCOPE: CPT

## 2020-08-28 PROCEDURE — 93005 ELECTROCARDIOGRAM TRACING: CPT

## 2020-08-28 PROCEDURE — 99284 EMERGENCY DEPT VISIT MOD MDM: CPT

## 2020-08-28 PROCEDURE — 36415 COLL VENOUS BLD VENIPUNCTURE: CPT

## 2020-08-28 PROCEDURE — 71046 X-RAY EXAM CHEST 2 VIEWS: CPT | Mod: 26

## 2020-08-28 PROCEDURE — 71046 X-RAY EXAM CHEST 2 VIEWS: CPT

## 2020-08-28 PROCEDURE — 84484 ASSAY OF TROPONIN QUANT: CPT

## 2020-08-28 PROCEDURE — 96374 THER/PROPH/DIAG INJ IV PUSH: CPT

## 2020-08-28 PROCEDURE — 80053 COMPREHEN METABOLIC PANEL: CPT

## 2020-08-28 PROCEDURE — 99284 EMERGENCY DEPT VISIT MOD MDM: CPT | Mod: 25

## 2020-08-28 PROCEDURE — 85025 COMPLETE CBC W/AUTO DIFF WBC: CPT

## 2020-08-28 PROCEDURE — 83690 ASSAY OF LIPASE: CPT

## 2020-08-28 PROCEDURE — U0003: CPT

## 2020-08-28 PROCEDURE — 87086 URINE CULTURE/COLONY COUNT: CPT

## 2020-08-28 RX ORDER — SODIUM CHLORIDE 9 MG/ML
1000 INJECTION INTRAMUSCULAR; INTRAVENOUS; SUBCUTANEOUS ONCE
Refills: 0 | Status: COMPLETED | OUTPATIENT
Start: 2020-08-28 | End: 2020-08-28

## 2020-08-28 RX ORDER — ONDANSETRON 8 MG/1
4 TABLET, FILM COATED ORAL ONCE
Refills: 0 | Status: COMPLETED | OUTPATIENT
Start: 2020-08-28 | End: 2020-08-28

## 2020-08-28 RX ADMIN — SODIUM CHLORIDE 1000 MILLILITER(S): 9 INJECTION INTRAMUSCULAR; INTRAVENOUS; SUBCUTANEOUS at 13:18

## 2020-08-28 NOTE — ED ADULT TRIAGE NOTE - CHIEF COMPLAINT QUOTE
pt complains of "not feeling right" with intermittent abdominal pain and nausea. States she vomited phlegm yesterday.

## 2020-08-28 NOTE — ED ADULT NURSE NOTE - CHPI ED NUR SYMPTOMS NEG
no hematuria/no fever/no blood in stool/no abdominal distension/no diarrhea/no chills/no dysuria/no burning urination

## 2020-08-28 NOTE — ED ADULT NURSE NOTE - MODE OF DISCHARGE
01/12/19      Regarding :  Kristopher Hollis  [address]  [facility] [address]       Negative TB reading 0 mm induration    Sincerely,         Shonda Madrigal, CNP  [facility] [place] [address] [address]  [address]  [facility] [address]  Phone: [phone]                               Ambulatory

## 2020-08-28 NOTE — ED ADULT NURSE NOTE - OBJECTIVE STATEMENT
Pt c/o n/v. Pt reports she had "a weird sensation in my stomach this morning" which has resolved. Denies fevers, chills, cough, CP, SOB, urinary symptoms.

## 2020-08-28 NOTE — ED STATDOCS - CLINICAL SUMMARY MEDICAL DECISION MAKING FREE TEXT BOX
Unclear etiology of symptoms. Possibly viral syndrome. Will eval for UTI vs PNA. Plan: fluids, labs, reassess. Unclear etiology of symptoms. Possibly viral syndrome. Will eval for UTI vs PNA.  No abdominal pain at all on my assessment - not suspicious for acute abdomen - below threshold for further w/u.  Less suspicious for atypical acs.  EKG non-ischemic.  Will check single trop which is sufficient given timing of symptoms. Plan: fluids, labs, reassess.

## 2020-08-28 NOTE — ED STATDOCS - OBJECTIVE STATEMENT
73 y/o female with a PMHx of HTN presents to the ED c/o n/v. Pt reports she had "a weird sensation in my stomach this morning" which has resolved. Denies fevers, chills, cough, CP, SOB, urinary symptoms. No other complaints at this time.

## 2020-08-28 NOTE — ED STATDOCS - PROGRESS NOTE DETAILS
Pt very well appearing on reassessment.  Does not wish to stay for further w/u.  Labs unremarkable.  Non-tender on reasssessment.  NO cp/sob.  NO e/o pna/ptx.  No e/o uti.  Pt very well appearing.  Would like d/c home.  understands indications to return.  D/c home with strict return precautions and prompt outpatient f/u.

## 2020-08-28 NOTE — ED STATDOCS - PATIENT PORTAL LINK FT
You can access the FollowMyHealth Patient Portal offered by [facility] by registering at the following website: [email or web address]. By joining [email or web address]’s FollowMyHealth portal, you will also be able to view your health information using other applications (apps) compatible with our system.

## 2020-08-29 LAB
CULTURE RESULTS: SIGNIFICANT CHANGE UP
SARS-COV-2 RNA SPEC QL NAA+PROBE: SIGNIFICANT CHANGE UP
SPECIMEN SOURCE: SIGNIFICANT CHANGE UP

## 2020-10-01 ENCOUNTER — APPOINTMENT (OUTPATIENT)
Dept: OBGYN | Facility: CLINIC | Age: 75
End: 2020-10-01
Payer: MEDICARE

## 2020-10-01 VITALS
DIASTOLIC BLOOD PRESSURE: 60 MMHG | TEMPERATURE: 97.4 F | HEIGHT: 64 IN | RESPIRATION RATE: 16 BRPM | SYSTOLIC BLOOD PRESSURE: 130 MMHG | WEIGHT: 184 LBS | BODY MASS INDEX: 31.41 KG/M2

## 2020-10-01 DIAGNOSIS — N95.2 POSTMENOPAUSAL ATROPHIC VAGINITIS: ICD-10-CM

## 2020-10-01 DIAGNOSIS — Z01.419 ENCOUNTER FOR GYNECOLOGICAL EXAMINATION (GENERAL) (ROUTINE) W/OUT ABNORMAL FINDINGS: ICD-10-CM

## 2020-10-01 PROCEDURE — 99213 OFFICE O/P EST LOW 20 MIN: CPT

## 2020-10-01 NOTE — HISTORY OF PRESENT ILLNESS
[Patient reported mammogram was normal] : Patient reported mammogram was normal [Patient reported PAP Smear was normal] : Patient reported PAP Smear was normal [Patient reported bone density results were normal] : Patient reported bone density results were normal [Previously active] : previously active [TextBox_4] : no vb, takes vit d, exercises little\par Trying to lose weight [Mammogramdate] : 6/20 [PapSmeardate] : 2017 [BoneDensityDate] : 7/2020 [ColonoscopyDate] : not yet

## 2020-10-01 NOTE — COUNSELING
[Nutrition/ Exercise/ Weight Management] : nutrition, exercise, weight management [Vitamins/Supplements] : vitamins/supplements [Drugs/Alcohol] : drugs, alcohol

## 2020-10-01 NOTE — PHYSICAL EXAM
[Appropriately responsive] : appropriately responsive [Alert] : alert [No Acute Distress] : no acute distress [No Lymphadenopathy] : no lymphadenopathy [Regular Rate Rhythm] : regular rate rhythm [No Murmurs] : no murmurs [Clear to Auscultation B/L] : clear to auscultation bilaterally [Soft] : soft [Non-tender] : non-tender [Non-distended] : non-distended [No HSM] : No HSM [No Lesions] : no lesions [No Mass] : no mass [Oriented x3] : oriented x3 [Examination Of The Breasts] : a normal appearance [No Masses] : no breast masses were palpable [Vulvar Atrophy] : vulvar atrophy [Labia Majora] : normal [Labia Minora] : normal [Atrophy] : atrophy [Normal] : normal [Uterine Adnexae] : normal [No Tenderness] : no tenderness [FreeTextEntry9] : kristin -

## 2020-10-05 ENCOUNTER — APPOINTMENT (OUTPATIENT)
Dept: DERMATOLOGY | Facility: CLINIC | Age: 75
End: 2020-10-05
Payer: MEDICARE

## 2020-10-05 DIAGNOSIS — L57.0 ACTINIC KERATOSIS: ICD-10-CM

## 2020-10-05 DIAGNOSIS — R21 RASH AND OTHER NONSPECIFIC SKIN ERUPTION: ICD-10-CM

## 2020-10-05 DIAGNOSIS — D22.9 MELANOCYTIC NEVI, UNSPECIFIED: ICD-10-CM

## 2020-10-05 DIAGNOSIS — L82.0 INFLAMED SEBORRHEIC KERATOSIS: ICD-10-CM

## 2020-10-05 PROCEDURE — 17110 DESTRUCTION B9 LES UP TO 14: CPT | Mod: 59

## 2020-10-05 PROCEDURE — 17003 DESTRUCT PREMALG LES 2-14: CPT | Mod: 59

## 2020-10-05 PROCEDURE — 99214 OFFICE O/P EST MOD 30 MIN: CPT | Mod: 25

## 2020-10-05 PROCEDURE — 17000 DESTRUCT PREMALG LESION: CPT | Mod: 59

## 2021-05-05 ENCOUNTER — NON-APPOINTMENT (OUTPATIENT)
Age: 76
End: 2021-05-05

## 2021-05-05 DIAGNOSIS — R53.83 OTHER FATIGUE: ICD-10-CM

## 2021-05-05 DIAGNOSIS — Z87.19 PERSONAL HISTORY OF OTHER DISEASES OF THE DIGESTIVE SYSTEM: ICD-10-CM

## 2021-05-05 DIAGNOSIS — Z87.442 PERSONAL HISTORY OF URINARY CALCULI: ICD-10-CM

## 2021-05-06 DIAGNOSIS — I65.23 OCCLUSION AND STENOSIS OF BILATERAL CAROTID ARTERIES: ICD-10-CM

## 2021-05-06 DIAGNOSIS — Z87.891 PERSONAL HISTORY OF NICOTINE DEPENDENCE: ICD-10-CM

## 2021-05-06 DIAGNOSIS — Z82.49 FAMILY HISTORY OF ISCHEMIC HEART DISEASE AND OTHER DISEASES OF THE CIRCULATORY SYSTEM: ICD-10-CM

## 2021-05-06 DIAGNOSIS — Z63.5 DISRUPTION OF FAMILY BY SEPARATION AND DIVORCE: ICD-10-CM

## 2021-05-06 DIAGNOSIS — R01.1 CARDIAC MURMUR, UNSPECIFIED: ICD-10-CM

## 2021-05-06 SDOH — SOCIAL STABILITY - SOCIAL INSECURITY: DISRUPTION OF FAMILY BY SEPARATION AND DIVORCE: Z63.5

## 2021-05-10 ENCOUNTER — APPOINTMENT (OUTPATIENT)
Dept: FAMILY MEDICINE | Facility: CLINIC | Age: 76
End: 2021-05-10
Payer: MEDICARE

## 2021-05-10 VITALS
DIASTOLIC BLOOD PRESSURE: 85 MMHG | HEART RATE: 89 BPM | TEMPERATURE: 97.6 F | WEIGHT: 190 LBS | OXYGEN SATURATION: 98 % | HEIGHT: 64 IN | BODY MASS INDEX: 32.44 KG/M2 | RESPIRATION RATE: 14 BRPM | SYSTOLIC BLOOD PRESSURE: 128 MMHG

## 2021-05-10 VITALS
OXYGEN SATURATION: 98 % | HEART RATE: 89 BPM | TEMPERATURE: 97.6 F | SYSTOLIC BLOOD PRESSURE: 128 MMHG | WEIGHT: 190 LBS | DIASTOLIC BLOOD PRESSURE: 85 MMHG | BODY MASS INDEX: 32.44 KG/M2 | HEIGHT: 64 IN

## 2021-05-10 DIAGNOSIS — J30.2 OTHER SEASONAL ALLERGIC RHINITIS: ICD-10-CM

## 2021-05-10 PROCEDURE — 99213 OFFICE O/P EST LOW 20 MIN: CPT

## 2021-05-10 NOTE — PHYSICAL EXAM
[No Acute Distress] : no acute distress [Well Developed] : well developed [Well-Appearing] : well-appearing [Normal Sclera/Conjunctiva] : normal sclera/conjunctiva [EOMI] : extraocular movements intact [Normal Outer Ear/Nose] : the outer ears and nose were normal in appearance [Normal Oropharynx] : the oropharynx was normal [No JVD] : no jugular venous distention [No Lymphadenopathy] : no lymphadenopathy [Supple] : supple [Thyroid Normal, No Nodules] : the thyroid was normal and there were no nodules present [No Respiratory Distress] : no respiratory distress  [No Accessory Muscle Use] : no accessory muscle use [Clear to Auscultation] : lungs were clear to auscultation bilaterally [Normal Rate] : normal rate  [Regular Rhythm] : with a regular rhythm [Normal S1, S2] : normal S1 and S2 [No Murmur] : no murmur heard [Pedal Pulses Present] : the pedal pulses are present [No Edema] : there was no peripheral edema [No Extremity Clubbing/Cyanosis] : no extremity clubbing/cyanosis [Soft] : abdomen soft [Non Tender] : non-tender [Non-distended] : non-distended [Normal Bowel Sounds] : normal bowel sounds [Normal Posterior Cervical Nodes] : no posterior cervical lymphadenopathy [Normal Anterior Cervical Nodes] : no anterior cervical lymphadenopathy [No Joint Swelling] : no joint swelling [Grossly Normal Strength/Tone] : grossly normal strength/tone [No Rash] : no rash [Coordination Grossly Intact] : coordination grossly intact [Normal Gait] : normal gait [Normal Affect] : the affect was normal [Normal Insight/Judgement] : insight and judgment were intact [Normal TMs] : both tympanic membranes were normal [de-identified] : obese, freq clearing of throat, occas cough, no resp distress, nontoxic appearance [de-identified] : +boggy edematous purplish bluish nasal mucosa with mucoid clear d/c  [de-identified] : lungs CTA B with regular breathing and with cough there are a few upper airway harsh BS but no wheezing and no crackles and no rhonchi at this time, good AE

## 2021-05-10 NOTE — PLAN
[FreeTextEntry1] : \par \par She is fasting today and would like to do labs as noted above for f/u visit next month\par \par Would like to do CXR. We disc that based on current exam that is not mandatory but if she would feel better doing CXR she can and I gave her Rx. We also disc that it would be reasonable to hold on to the CXR Rx for a few days and see if sxs improve with supp care measures for allergies/post nasal drip and if they do then can defer on CXR but if sxs do not improve or if signif new/worsening sxs appear then do CXR. She will consider options and decide what she prefers to do. \par \par Revd allergy treatment options incl envtal measures and OTC meds and allergy/URI HA given with written instructions about meds/measures to try. \par \par RTO if incr/new sxs or if sxs do not start to improve signif in next 3-5 days with these measures.

## 2021-05-10 NOTE — ASSESSMENT
[FreeTextEntry1] : Cough/post nasal drip etc as noted above-- I suspect sxs are most likely related to seasonal allergies given very high pollen counts we are having at this time. No evidence for infectious etiology at this time.

## 2021-05-10 NOTE — HISTORY OF PRESENT ILLNESS
[FreeTextEntry8] : Has had lots of clear/white phlegm and post nasal drip and intermittent cough over past couple of weeks. No fevers. No purulent phlegm. No SOB. no CP. feels like she is swallowing a lot of the phlegm. Worse in AM for a few hours and then improves. Feels the majority of mucous in her head and back of throat and sometimes feels it in upper chest where it can tend to trigger some wheezing sounds when she exhales. SHe has not yet tried any measures other than opening windows to air out the house. \par \par She has not yet scheduled COVID vaccine but would like to but wants to make sure ok to do so so would like me to check lungs.\par \par She would like to do CXR given above sxs. \par \par Still smoking but has cut back significantly (4-5 cigs per day). She is considering quitting as her daughter is planning to quit as well now.

## 2021-05-10 NOTE — REVIEW OF SYSTEMS
[Negative] : Neurological [Itching] : itching [Nasal Discharge] : nasal discharge [Postnasal Drip] : postnasal drip [Wheezing] : wheezing [Cough] : cough [Discharge] : no discharge [Earache] : no earache [Sore Throat] : no sore throat [Shortness Of Breath] : no shortness of breath [Dyspnea on Exertion] : no dyspnea on exertion [FreeTextEntry3] : itchy eyes sometimes [FreeTextEntry4] : see HPI; clear nasal d/c and post nasal drip [FreeTextEntry6] : see HPI

## 2021-05-11 LAB
ALBUMIN SERPL ELPH-MCNC: 4.3 G/DL
ALP BLD-CCNC: 99 U/L
ALT SERPL-CCNC: 17 U/L
ANION GAP SERPL CALC-SCNC: 11 MMOL/L
AST SERPL-CCNC: 13 U/L
BILIRUB SERPL-MCNC: 0.4 MG/DL
BUN SERPL-MCNC: 17 MG/DL
CALCIUM SERPL-MCNC: 9.4 MG/DL
CHLORIDE SERPL-SCNC: 105 MMOL/L
CHOLEST SERPL-MCNC: 113 MG/DL
CO2 SERPL-SCNC: 25 MMOL/L
CREAT SERPL-MCNC: 0.7 MG/DL
GLUCOSE SERPL-MCNC: 135 MG/DL
HDLC SERPL-MCNC: 40 MG/DL
LDLC SERPL CALC-MCNC: 53 MG/DL
NONHDLC SERPL-MCNC: 73 MG/DL
POTASSIUM SERPL-SCNC: 4.5 MMOL/L
PROT SERPL-MCNC: 6.4 G/DL
SODIUM SERPL-SCNC: 142 MMOL/L
TRIGL SERPL-MCNC: 102 MG/DL

## 2021-05-24 ENCOUNTER — RX RENEWAL (OUTPATIENT)
Age: 76
End: 2021-05-24

## 2021-06-01 ENCOUNTER — OUTPATIENT (OUTPATIENT)
Dept: OUTPATIENT SERVICES | Facility: HOSPITAL | Age: 76
LOS: 1 days | End: 2021-06-01
Payer: MEDICARE

## 2021-06-01 ENCOUNTER — APPOINTMENT (OUTPATIENT)
Dept: RADIOLOGY | Facility: CLINIC | Age: 76
End: 2021-06-01
Payer: MEDICARE

## 2021-06-01 DIAGNOSIS — R05 COUGH: ICD-10-CM

## 2021-06-01 PROCEDURE — 71046 X-RAY EXAM CHEST 2 VIEWS: CPT

## 2021-06-01 PROCEDURE — 71046 X-RAY EXAM CHEST 2 VIEWS: CPT | Mod: 26

## 2021-06-02 ENCOUNTER — NON-APPOINTMENT (OUTPATIENT)
Age: 76
End: 2021-06-02

## 2021-06-07 ENCOUNTER — APPOINTMENT (OUTPATIENT)
Dept: FAMILY MEDICINE | Facility: CLINIC | Age: 76
End: 2021-06-07
Payer: MEDICARE

## 2021-06-07 VITALS
SYSTOLIC BLOOD PRESSURE: 130 MMHG | OXYGEN SATURATION: 98 % | WEIGHT: 194 LBS | DIASTOLIC BLOOD PRESSURE: 82 MMHG | TEMPERATURE: 97 F | BODY MASS INDEX: 33.12 KG/M2 | HEART RATE: 78 BPM | HEIGHT: 64 IN

## 2021-06-07 PROCEDURE — 99214 OFFICE O/P EST MOD 30 MIN: CPT | Mod: 25

## 2021-06-07 PROCEDURE — 36415 COLL VENOUS BLD VENIPUNCTURE: CPT

## 2021-06-07 NOTE — PLAN
[FreeTextEntry1] : Revd recent labs. Glucose 135. No HgbA1C was processed by lab so will check today. Also will check TSH as lab did not process that order last month. And will check urine microalbumin in case the A1C falls in DM range as does the glucose. \par \par HDL slightly low at 40. Increase exercise/physical activity levels to boost HDL\par \par Discussed the importance of  eating (eg Mediterranean style eating plan) and regular exercise/staying as physically active as possible. SHe is motivated to start water walking in her pool and to eat better. \par \par Reviewed importance of good self care (eg meditation, yoga, adequate rest, regular exercise, magnesium, clean eating etc). She plans to visit sister in near future.\par \par Revd/recommended COVID vaccines and she agrees to get in near future\par \par Cont same meds/doses. \par \par Quit smoking urged and revd risks of contd smoking. She is not ready to quit yet but has reduced to 4 cigs per day and agrees to try to not increase. No safe level of smoking but reducing number of cigs per day as low as poss until ready to quit is good.  \par \par F/u with Dr. Apple as recommended by him.\par \par Next PE due 12/2021.

## 2021-06-07 NOTE — REVIEW OF SYSTEMS
[Itching] : itching [Nasal Discharge] : nasal discharge [Postnasal Drip] : postnasal drip [Joint Pain] : joint pain [Joint Stiffness] : joint stiffness [Recent Change In Weight] : ~T recent weight change [Negative] : Respiratory [Fever] : no fever [Chills] : no chills [Fatigue] : no fatigue [Discharge] : no discharge [Earache] : no earache [Sore Throat] : no sore throat [Shortness Of Breath] : no shortness of breath [Wheezing] : no wheezing [Cough] : no cough [Dyspnea on Exertion] : no dyspnea on exertion [Muscle Pain] : no muscle pain [FreeTextEntry2] : wgt gain over past few yrs but planning to work on  eating/exercise [FreeTextEntry3] : itchy eyes sometimes [FreeTextEntry4] : see HPI; clear nasal d/c and post nasal drip [FreeTextEntry9] : stiff achy joints due to OA [de-identified] : high stress in life with family health issues, plans to visit sister in [place] as she always feels better when she spends time with her

## 2021-06-07 NOTE — PHYSICAL EXAM
[No Acute Distress] : no acute distress [Well Developed] : well developed [Well-Appearing] : well-appearing [EOMI] : extraocular movements intact [Normal Sclera/Conjunctiva] : normal sclera/conjunctiva [No JVD] : no jugular venous distention [No Lymphadenopathy] : no lymphadenopathy [Supple] : supple [Thyroid Normal, No Nodules] : the thyroid was normal and there were no nodules present [No Respiratory Distress] : no respiratory distress  [No Accessory Muscle Use] : no accessory muscle use [Clear to Auscultation] : lungs were clear to auscultation bilaterally [Normal Rate] : normal rate  [Regular Rhythm] : with a regular rhythm [Normal S1, S2] : normal S1 and S2 [No Murmur] : no murmur heard [Pedal Pulses Present] : the pedal pulses are present [No Edema] : there was no peripheral edema [No Extremity Clubbing/Cyanosis] : no extremity clubbing/cyanosis [Soft] : abdomen soft [Non Tender] : non-tender [Non-distended] : non-distended [Normal Bowel Sounds] : normal bowel sounds [Normal Posterior Cervical Nodes] : no posterior cervical lymphadenopathy [Normal Anterior Cervical Nodes] : no anterior cervical lymphadenopathy [No Joint Swelling] : no joint swelling [Grossly Normal Strength/Tone] : grossly normal strength/tone [No Rash] : no rash [Coordination Grossly Intact] : coordination grossly intact [Normal Gait] : normal gait [Normal Affect] : the affect was normal [Normal Insight/Judgement] : insight and judgment were intact [de-identified] : obese [de-identified] : lungs CTA B with regular breathing and with cough today (improved exam from last month)

## 2021-06-07 NOTE — ASSESSMENT
[FreeTextEntry1] : HTN well controlled on current regimen. \par \par High chol, reasonably well controlled (just low HDL)\par \par A1C-- glu in DM range this time around, will check A1C\par \par Tobacco use, not yet ready to quit\par \par Hypothyroidism-- needs TSH checked

## 2021-06-07 NOTE — HISTORY OF PRESENT ILLNESS
[FreeTextEntry1] : KEYLA KEMP is a 75 year old female here for a follow up visit.\par  [de-identified] : Here for f/u. \par \emily Still smoking and not ready to quit at this time but has reduced number of cigs to about 4 per day and is willing to stick with this lower level of smoking until hopefully some point in the future when she might consider quitting. \par \par Not consistent with clean eating/exercise but says she is motivated to start working on these areas now. \par \emily Had recent CXR that was wnl. Cough is resolved but still has chronic nasal d/c and post nasal drip (clear)\par \emily Has had not had COVID vaccine yet but plans for near future.

## 2021-06-08 LAB
CREAT SPEC-SCNC: 76 MG/DL
ESTIMATED AVERAGE GLUCOSE: 137 MG/DL
HBA1C MFR BLD HPLC: 6.4 %
MICROALBUMIN 24H UR DL<=1MG/L-MCNC: <1.2 MG/DL
MICROALBUMIN/CREAT 24H UR-RTO: NORMAL MG/G
TSH SERPL-ACNC: 2.91 UIU/ML

## 2021-06-10 ENCOUNTER — NON-APPOINTMENT (OUTPATIENT)
Age: 76
End: 2021-06-10

## 2021-06-11 ENCOUNTER — RX RENEWAL (OUTPATIENT)
Age: 76
End: 2021-06-11

## 2021-06-16 ENCOUNTER — APPOINTMENT (OUTPATIENT)
Dept: DERMATOLOGY | Facility: CLINIC | Age: 76
End: 2021-06-16
Payer: MEDICARE

## 2021-06-16 VITALS — WEIGHT: 194 LBS | BODY MASS INDEX: 33.12 KG/M2 | HEIGHT: 64 IN

## 2021-06-16 DIAGNOSIS — L40.0 PSORIASIS VULGARIS: ICD-10-CM

## 2021-06-16 PROCEDURE — 99213 OFFICE O/P EST LOW 20 MIN: CPT | Mod: 25

## 2021-06-16 PROCEDURE — 11102 TANGNTL BX SKIN SINGLE LES: CPT

## 2021-06-16 NOTE — HISTORY OF PRESENT ILLNESS
[FreeTextEntry1] : Notes itching and irritation of the left foot lesion. [de-identified] : 3 months, with some spreading of the bump.  No self tx.  No bleeding.\par Also with itching of the legs and arms, with known hx of psoriasis.

## 2021-06-16 NOTE — PHYSICAL EXAM
[Alert] : alert [Oriented x 3] : ~L oriented x 3 [Well Nourished] : well nourished [Declined] : declined [FreeTextEntry3] : erythematous scaling patches, bilateral elbows, knees, left calf.\par \par Excoriated plaque, 1 cm, left proximal dorsal foot.

## 2021-06-16 NOTE — ASSESSMENT
[FreeTextEntry1] : Psoriasis\par Education.\par Lidex cream bid prn.\par AmLactin lotion daily.\par \par R/O DF - left foot.\par Will call patient with bx results when available.

## 2021-06-18 ENCOUNTER — RX RENEWAL (OUTPATIENT)
Age: 76
End: 2021-06-18

## 2021-07-01 LAB — CORE LAB BIOPSY: NORMAL

## 2021-07-06 ENCOUNTER — APPOINTMENT (OUTPATIENT)
Dept: DERMATOLOGY | Facility: CLINIC | Age: 76
End: 2021-07-06
Payer: MEDICARE

## 2021-07-06 PROCEDURE — 99213 OFFICE O/P EST LOW 20 MIN: CPT

## 2021-07-06 NOTE — HISTORY OF PRESENT ILLNESS
[de-identified] : The patient has been fit in for urgent appointment.\par had lesion removed from L foot 3 wks ago; concerned with infection; \par

## 2021-07-06 NOTE — ASSESSMENT
[FreeTextEntry1] : s/p Bx; path showed porokeratosis/verruca\par persistent lesion more consistent with verruca; \par \par Therapeutic options and their risks and benefits; along with multiple diagnostic possibilities were discussed at length; risks and benefits of further study were discussed;\par \par continue wound care;  use advanced healing bandages;  would not remove unless very symptomatic, as this would likely result in prolonged healing; \par \par Continue regular exams;

## 2021-08-18 ENCOUNTER — TRANSCRIPTION ENCOUNTER (OUTPATIENT)
Age: 76
End: 2021-08-18

## 2021-09-29 ENCOUNTER — OUTPATIENT (OUTPATIENT)
Dept: OUTPATIENT SERVICES | Facility: HOSPITAL | Age: 76
LOS: 1 days | End: 2021-09-29
Payer: MEDICARE

## 2021-09-29 ENCOUNTER — APPOINTMENT (OUTPATIENT)
Dept: MAMMOGRAPHY | Facility: CLINIC | Age: 76
End: 2021-09-29
Payer: MEDICARE

## 2021-09-29 ENCOUNTER — RESULT REVIEW (OUTPATIENT)
Age: 76
End: 2021-09-29

## 2021-09-29 DIAGNOSIS — Z00.00 ENCOUNTER FOR GENERAL ADULT MEDICAL EXAMINATION WITHOUT ABNORMAL FINDINGS: ICD-10-CM

## 2021-09-29 PROCEDURE — 77063 BREAST TOMOSYNTHESIS BI: CPT

## 2021-09-29 PROCEDURE — 77067 SCR MAMMO BI INCL CAD: CPT

## 2021-09-29 PROCEDURE — 77063 BREAST TOMOSYNTHESIS BI: CPT | Mod: 26

## 2021-09-29 PROCEDURE — 77067 SCR MAMMO BI INCL CAD: CPT | Mod: 26

## 2021-10-25 ENCOUNTER — MED ADMIN CHARGE (OUTPATIENT)
Age: 76
End: 2021-10-25

## 2021-10-25 ENCOUNTER — APPOINTMENT (OUTPATIENT)
Dept: FAMILY MEDICINE | Facility: CLINIC | Age: 76
End: 2021-10-25
Payer: MEDICARE

## 2021-10-25 PROCEDURE — 90686 IIV4 VACC NO PRSV 0.5 ML IM: CPT

## 2021-10-25 PROCEDURE — G0008: CPT

## 2021-12-07 ENCOUNTER — APPOINTMENT (OUTPATIENT)
Dept: FAMILY MEDICINE | Facility: CLINIC | Age: 76
End: 2021-12-07

## 2022-01-04 ENCOUNTER — FORM ENCOUNTER (OUTPATIENT)
Age: 77
End: 2022-01-04

## 2022-01-20 ENCOUNTER — RX RENEWAL (OUTPATIENT)
Age: 77
End: 2022-01-20

## 2022-01-24 ENCOUNTER — APPOINTMENT (OUTPATIENT)
Dept: DERMATOLOGY | Facility: CLINIC | Age: 77
End: 2022-01-24
Payer: MEDICARE

## 2022-01-24 DIAGNOSIS — L30.9 DERMATITIS, UNSPECIFIED: ICD-10-CM

## 2022-01-24 DIAGNOSIS — D22.9 MELANOCYTIC NEVI, UNSPECIFIED: ICD-10-CM

## 2022-01-24 DIAGNOSIS — L81.4 OTHER MELANIN HYPERPIGMENTATION: ICD-10-CM

## 2022-01-24 DIAGNOSIS — D48.5 NEOPLASM OF UNCERTAIN BEHAVIOR OF SKIN: ICD-10-CM

## 2022-01-24 DIAGNOSIS — L85.3 XEROSIS CUTIS: ICD-10-CM

## 2022-01-24 DIAGNOSIS — Z12.83 ENCOUNTER FOR SCREENING FOR MALIGNANT NEOPLASM OF SKIN: ICD-10-CM

## 2022-01-24 PROCEDURE — 99214 OFFICE O/P EST MOD 30 MIN: CPT | Mod: 25

## 2022-01-24 PROCEDURE — 17110 DESTRUCTION B9 LES UP TO 14: CPT

## 2022-03-26 ENCOUNTER — RX RENEWAL (OUTPATIENT)
Age: 77
End: 2022-03-26

## 2022-03-28 ENCOUNTER — APPOINTMENT (OUTPATIENT)
Dept: FAMILY MEDICINE | Facility: CLINIC | Age: 77
End: 2022-03-28
Payer: MEDICARE

## 2022-03-28 VITALS
WEIGHT: 194 LBS | TEMPERATURE: 97.4 F | OXYGEN SATURATION: 98 % | HEIGHT: 64 IN | HEART RATE: 77 BPM | BODY MASS INDEX: 33.12 KG/M2 | DIASTOLIC BLOOD PRESSURE: 62 MMHG | SYSTOLIC BLOOD PRESSURE: 132 MMHG

## 2022-03-28 VITALS — DIASTOLIC BLOOD PRESSURE: 60 MMHG | SYSTOLIC BLOOD PRESSURE: 134 MMHG

## 2022-03-28 PROCEDURE — 36415 COLL VENOUS BLD VENIPUNCTURE: CPT

## 2022-03-28 PROCEDURE — G0444 DEPRESSION SCREEN ANNUAL: CPT

## 2022-03-28 PROCEDURE — G0439: CPT

## 2022-03-28 RX ORDER — FLUOCINONIDE 0.05 MG/G
0.05 OINTMENT TOPICAL DAILY
Qty: 1 | Refills: 3 | Status: DISCONTINUED | COMMUNITY
Start: 2020-01-21 | End: 2022-03-28

## 2022-03-28 RX ORDER — FLUOROURACIL 50 MG/G
5 CREAM TOPICAL
Qty: 1 | Refills: 0 | Status: DISCONTINUED | COMMUNITY
Start: 2018-01-13 | End: 2022-03-28

## 2022-03-28 NOTE — PLAN
[FreeTextEntry1] : Had a scrambled egg and toast with butter and coffee with half and half this AM; would still prefer to do labs today as is hard to get back for lab visit. She declines ECG today as will be having in near future with Dr. Apple\par \par Reviewed age-appropriate preventive screening tests with patient. UTD on mammogram and DEXA. Due for gyn exam and baseline CRC screening (revd pros/cons of Cologuard and colonoscopy) and she will schedule for near future. She is willing to do Cologuard test and if this is positive she is willing to do colonoscopy. \par \par Revd/recommended Shingrix and she will consider. \par \par Discussed clean eating (eg Mediterranean style eating plan) and regular exercise/staying as physically active as possible.  Include balance exercises and strength training and core strengthening exercises for bone health and to decrease risk for falls. \par \par Reviewed importance of good self care (eg meditation, yoga, adequate rest, regular exercise, magnesium, clean eating etc).\par \par Cont same meds/doses. BP goal is <=135/85 on average on home checks. \par \par Quit smoking urged and revd risks of contd smoking. She is not ready to quit yet but has reduced to 4-5 cigs per day and agrees to try to not increase. No safe level of smoking but reducing number of cigs per day as low as poss until ready to quit is good.  \par \par Revd/recommended pulm eval and Chest CT lung cancer screening (revd prod/cons oc CT) and she is willing to do both in near future once she returns form her trip to [place] to see sister\par \par F/u with Dr. Apple as recommended by him.\par \par Next CPE in 1 yr. RPA visit (chol, HTN, IFG etc) and repeat fasting labs due in 4-6 months or as needed based on these lab results.

## 2022-03-28 NOTE — ASSESSMENT
[FreeTextEntry1] : KEYLA KEMP is a 76 year old female here for a physical exam.  She is also here to follow up on medical issues as noted above.\par

## 2022-03-28 NOTE — REVIEW OF SYSTEMS
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

## 2022-03-28 NOTE — PHYSICAL EXAM
[No Acute Distress] : no acute distress [Well Developed] : well developed [Normal Sclera/Conjunctiva] : normal sclera/conjunctiva [Well-Appearing] : well-appearing [EOMI] : extraocular movements intact [Normal Outer Ear/Nose] : the outer ears and nose were normal in appearance [No JVD] : no jugular venous distention [No Lymphadenopathy] : no lymphadenopathy [Supple] : supple [Thyroid Normal, No Nodules] : the thyroid was normal and there were no nodules present [No Respiratory Distress] : no respiratory distress  [No Accessory Muscle Use] : no accessory muscle use [Clear to Auscultation] : lungs were clear to auscultation bilaterally [Normal Rate] : normal rate  [Regular Rhythm] : with a regular rhythm [Normal S1, S2] : normal S1 and S2 [No Murmur] : no murmur heard [Pedal Pulses Present] : the pedal pulses are present [No Edema] : there was no peripheral edema [No Extremity Clubbing/Cyanosis] : no extremity clubbing/cyanosis [Soft] : abdomen soft [Non Tender] : non-tender [Non-distended] : non-distended [Normal Bowel Sounds] : normal bowel sounds [Normal Posterior Cervical Nodes] : no posterior cervical lymphadenopathy [Normal Anterior Cervical Nodes] : no anterior cervical lymphadenopathy [No Joint Swelling] : no joint swelling [Grossly Normal Strength/Tone] : grossly normal strength/tone [No Rash] : no rash [Coordination Grossly Intact] : coordination grossly intact [Normal Affect] : the affect was normal [Normal Gait] : normal gait [Normal Insight/Judgement] : insight and judgment were intact [No Carotid Bruits] : no carotid bruits [de-identified] : mildly obese [de-identified] : lungs CTA B with regular breathing ; with cough today there are a few scattered rhonchi that shift and clear, no crackles, no wheezes, fairly good AE

## 2022-03-28 NOTE — HEALTH RISK ASSESSMENT
[0] : 2) Feeling down, depressed, or hopeless: Not at all (0) [PHQ-2 Negative - No further assessment needed] : PHQ-2 Negative - No further assessment needed [[id number]] : 0

## 2022-03-28 NOTE — HISTORY OF PRESENT ILLNESS
[de-identified] : Her last PE was 12/2020\par \par Her last tetanus shot was 12/2015\par Pneumovax 12/2020, Prevnar 12/2019\par Shingrix-- never\par Has had COVID vacc x 3 doses \par Has had flu vacc this season\par \par Her last dentist visit was within past 6 months\par Her last eye doctor appointment was within past year\par Her last dermatologist visit was within past year (Dr. Kent)\par \par Her last colonoscopy was never\par Her last mammogram was 9/2021\par Her last DEXA was 7/2020\par Her last gyn exam was  2020 Dr. Dickerson\par \par Santa has h/o HTN, high cholesterol, hypothyroidism, IFG and she is an active smoker. \par \emily Takes meds consistently and tolerates them well. BPs not checked at home as causes her too much stress/anxiety to do so. UTD on f/u with Dr. Apple (last eval and testing in summer/Fall 2021)\par \par Still smoking and not ready to quit at this time but has reduced number of cigs to about 5 per day and is willing to stick with this lower level of smoking until hopefully some point in the future when she might consider quitting. 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 Plans to start using stationary bike and eating smaller portions/ food choices. Last A1C was 6.4% and we disc this is getting close to DM range. \par \emily Had CXR 6/2021 that was wnl. Cough is back again intermittently as she  has chronic nasal d/c and post nasal drip (clear) and sputum production (white/clear). No fevers. No SOB.  We are discussing lung cancer screening (Chest CT) and pulm eval for PFTs today and she thinks she is interested in doing these at this point later spring after she returns from visit with her sister\par

## 2022-03-29 ENCOUNTER — FORM ENCOUNTER (OUTPATIENT)
Age: 77
End: 2022-03-29

## 2022-03-29 DIAGNOSIS — R73.01 IMPAIRED FASTING GLUCOSE: ICD-10-CM

## 2022-03-29 LAB
ALBUMIN SERPL ELPH-MCNC: 4.5 G/DL
ALP BLD-CCNC: 111 U/L
ALT SERPL-CCNC: 14 U/L
ANION GAP SERPL CALC-SCNC: 12 MMOL/L
AST SERPL-CCNC: 14 U/L
BILIRUB SERPL-MCNC: 0.5 MG/DL
BUN SERPL-MCNC: 18 MG/DL
CALCIUM SERPL-MCNC: 9.6 MG/DL
CHLORIDE SERPL-SCNC: 106 MMOL/L
CHOLEST SERPL-MCNC: 130 MG/DL
CO2 SERPL-SCNC: 24 MMOL/L
CREAT SERPL-MCNC: 0.65 MG/DL
EGFR: 90 ML/MIN/1.73M2
ESTIMATED AVERAGE GLUCOSE: 151 MG/DL
GLUCOSE SERPL-MCNC: 146 MG/DL
HBA1C MFR BLD HPLC: 6.9 %
HDLC SERPL-MCNC: 41 MG/DL
LDLC SERPL CALC-MCNC: 58 MG/DL
NONHDLC SERPL-MCNC: 89 MG/DL
POTASSIUM SERPL-SCNC: 4.4 MMOL/L
PROT SERPL-MCNC: 7.1 G/DL
SODIUM SERPL-SCNC: 142 MMOL/L
TRIGL SERPL-MCNC: 155 MG/DL
TSH SERPL-ACNC: 2.74 UIU/ML

## 2022-04-05 ENCOUNTER — NON-APPOINTMENT (OUTPATIENT)
Age: 77
End: 2022-04-05

## 2022-04-07 ENCOUNTER — APPOINTMENT (OUTPATIENT)
Dept: FAMILY MEDICINE | Facility: CLINIC | Age: 77
End: 2022-04-07

## 2022-04-11 ENCOUNTER — OUTPATIENT (OUTPATIENT)
Dept: OUTPATIENT SERVICES | Facility: HOSPITAL | Age: 77
LOS: 1 days | End: 2022-04-11
Payer: MEDICARE

## 2022-04-11 ENCOUNTER — APPOINTMENT (OUTPATIENT)
Dept: CT IMAGING | Facility: CLINIC | Age: 77
End: 2022-04-11
Payer: MEDICARE

## 2022-04-11 DIAGNOSIS — R05.9 COUGH, UNSPECIFIED: ICD-10-CM

## 2022-04-11 PROCEDURE — G1004: CPT

## 2022-04-11 PROCEDURE — 71250 CT THORAX DX C-: CPT | Mod: 26,ME

## 2022-04-11 PROCEDURE — 71250 CT THORAX DX C-: CPT | Mod: ME

## 2022-04-12 DIAGNOSIS — K76.89 OTHER SPECIFIED DISEASES OF LIVER: ICD-10-CM

## 2022-04-13 ENCOUNTER — NON-APPOINTMENT (OUTPATIENT)
Age: 77
End: 2022-04-13

## 2022-04-23 ENCOUNTER — RX RENEWAL (OUTPATIENT)
Age: 77
End: 2022-04-23

## 2022-05-23 ENCOUNTER — FORM ENCOUNTER (OUTPATIENT)
Age: 77
End: 2022-05-23

## 2022-06-20 ENCOUNTER — APPOINTMENT (OUTPATIENT)
Dept: INTERNAL MEDICINE | Facility: CLINIC | Age: 77
End: 2022-06-20

## 2022-07-04 ENCOUNTER — FORM ENCOUNTER (OUTPATIENT)
Age: 77
End: 2022-07-04

## 2022-07-17 ENCOUNTER — NON-APPOINTMENT (OUTPATIENT)
Age: 77
End: 2022-07-17

## 2022-07-18 ENCOUNTER — APPOINTMENT (OUTPATIENT)
Dept: FAMILY MEDICINE | Facility: CLINIC | Age: 77
End: 2022-07-18

## 2022-07-19 ENCOUNTER — NON-APPOINTMENT (OUTPATIENT)
Age: 77
End: 2022-07-19

## 2022-07-20 ENCOUNTER — RX RENEWAL (OUTPATIENT)
Age: 77
End: 2022-07-20

## 2022-08-05 ENCOUNTER — APPOINTMENT (OUTPATIENT)
Dept: INTERNAL MEDICINE | Facility: CLINIC | Age: 77
End: 2022-08-05

## 2022-08-05 ENCOUNTER — NON-APPOINTMENT (OUTPATIENT)
Age: 77
End: 2022-08-05

## 2022-08-05 VITALS
TEMPERATURE: 96.6 F | SYSTOLIC BLOOD PRESSURE: 120 MMHG | RESPIRATION RATE: 16 BRPM | DIASTOLIC BLOOD PRESSURE: 72 MMHG | HEIGHT: 64 IN | BODY MASS INDEX: 32.27 KG/M2 | HEART RATE: 71 BPM | OXYGEN SATURATION: 97 % | WEIGHT: 189 LBS

## 2022-08-05 DIAGNOSIS — J98.4 OTHER DISORDERS OF LUNG: ICD-10-CM

## 2022-08-05 PROCEDURE — 99406 BEHAV CHNG SMOKING 3-10 MIN: CPT

## 2022-08-05 PROCEDURE — 94010 BREATHING CAPACITY TEST: CPT

## 2022-08-05 PROCEDURE — 99204 OFFICE O/P NEW MOD 45 MIN: CPT | Mod: 25

## 2022-08-05 NOTE — COUNSELING
[Cessation strategies including cessation program discussed] : Cessation strategies including cessation program discussed [Use of nicotine replacement therapies and other medications discussed] : Use of nicotine replacement therapies and other medications discussed [Encouraged to pick a quit date and identify support needed to quit] : Encouraged to pick a quit date and identify support needed to quit [Yes] : Willing to quit smoking [FreeTextEntry1] : 7 [Potential consequences of obesity discussed] : Potential consequences of obesity discussed [Decrease Portions] : decrease portions [FreeTextEntry2] : healthy foods

## 2022-08-05 NOTE — PROCEDURE
[FreeTextEntry1] : Spirometry today shows a mild restrictive deficit with an FVC of 1.94 or 73% predicted.

## 2022-08-05 NOTE — ASSESSMENT
[FreeTextEntry1] : #1 76-year-old female with history of cigarette smoking,  and is a current smoker.  She was counseled on cessation today.  See above.  Patient will also have full pulmonary function testing. \par \par #2  CT scan of chest April 12 2022 showing minimal patchy findings in the apices.  Also a 3 mm right lower lobe nodule.  Regarding the former, a following CT scan of chest was recommended in 3 months.  This was ordered today.  If this is all right, the patient will subsequently need annual screening CT scans of chest given her cigarette smoking history.\par \par #3  Obesity.  The patient was counseled on a healthy weight reduction diet today.\par \par #4  Mild restrictive lung disease secondary to obesity.

## 2022-08-05 NOTE — HISTORY OF PRESENT ILLNESS
[TextBox_4] : This is the first pulm appointment for this 76-year-old female with history of obesity, hypertension, hyperlipidemia.  She has smoked cigarettes for 60 years, averaging about a third a pack per day.  She is currently down to 3 cigarettes/day.  She did not have any bronchitis a few weeks ago was given antibiotic and steroids she did not take these medicines.  She tells me her symptoms have resolved.  She is not having coughing, wheezing, dyspnea on exertion, or sputum production.  She is not having fever or chills.  She is not having chest pain or palpitations.\par \par She had a CT scan of the chest on April 12, 2022 which showed minimal patchy findings in the apices.  There is also a 3 mm right lower lobe nodule.  Regarding the former, a following CT scan of chest was recommended in 3 months.  This was ordered today.

## 2022-08-22 ENCOUNTER — NON-APPOINTMENT (OUTPATIENT)
Age: 77
End: 2022-08-22

## 2022-08-22 ENCOUNTER — APPOINTMENT (OUTPATIENT)
Dept: FAMILY MEDICINE | Facility: CLINIC | Age: 77
End: 2022-08-22

## 2022-08-22 VITALS
DIASTOLIC BLOOD PRESSURE: 72 MMHG | HEART RATE: 77 BPM | SYSTOLIC BLOOD PRESSURE: 142 MMHG | OXYGEN SATURATION: 97 % | HEIGHT: 64 IN | TEMPERATURE: 96.9 F | WEIGHT: 190 LBS | BODY MASS INDEX: 32.44 KG/M2

## 2022-08-22 VITALS — SYSTOLIC BLOOD PRESSURE: 128 MMHG | DIASTOLIC BLOOD PRESSURE: 68 MMHG

## 2022-08-22 DIAGNOSIS — Z23 ENCOUNTER FOR IMMUNIZATION: ICD-10-CM

## 2022-08-22 PROCEDURE — ZZZZZ: CPT

## 2022-08-22 PROCEDURE — G0008: CPT

## 2022-08-22 PROCEDURE — 90686 IIV4 VACC NO PRSV 0.5 ML IM: CPT

## 2022-08-22 PROCEDURE — 99214 OFFICE O/P EST MOD 30 MIN: CPT | Mod: 25

## 2022-08-22 PROCEDURE — 36415 COLL VENOUS BLD VENIPUNCTURE: CPT

## 2022-08-22 NOTE — REVIEW OF SYSTEMS
[Recent Change In Weight] : ~T recent weight change [Cough] : cough [Joint Pain] : joint pain [Joint Stiffness] : joint stiffness [Negative] : ENT [Fever] : no fever [Chills] : no chills [Fatigue] : no fatigue [Shortness Of Breath] : no shortness of breath [Wheezing] : no wheezing [Dyspnea on Exertion] : no dyspnea on exertion [Muscle Pain] : no muscle pain [FreeTextEntry2] : wgt gain over past few yrs but planning to work on  eating/exercise now  [FreeTextEntry6] : intermittent cough on and off for years, due in part to allergies/post nasal drip also related to long term tobacco use, seeing Dr. Rubio for eval  [FreeTextEntry9] : stiff achy joints due to OA [de-identified] : high stress in life with ongoing family stressors but she is managing well enough overall and does not feel she needs/wants meds/therapy at this time

## 2022-08-22 NOTE — PHYSICAL EXAM
[No Acute Distress] : no acute distress [Well Developed] : well developed [Well-Appearing] : well-appearing [Normal Sclera/Conjunctiva] : normal sclera/conjunctiva [EOMI] : extraocular movements intact [Normal Outer Ear/Nose] : the outer ears and nose were normal in appearance [No JVD] : no jugular venous distention [No Lymphadenopathy] : no lymphadenopathy [Supple] : supple [Thyroid Normal, No Nodules] : the thyroid was normal and there were no nodules present [No Respiratory Distress] : no respiratory distress  [No Accessory Muscle Use] : no accessory muscle use [Clear to Auscultation] : lungs were clear to auscultation bilaterally [Normal Rate] : normal rate  [Regular Rhythm] : with a regular rhythm [Normal S1, S2] : normal S1 and S2 [No Murmur] : no murmur heard [No Carotid Bruits] : no carotid bruits [Pedal Pulses Present] : the pedal pulses are present [No Edema] : there was no peripheral edema [No Extremity Clubbing/Cyanosis] : no extremity clubbing/cyanosis [Soft] : abdomen soft [Non Tender] : non-tender [Non-distended] : non-distended [Normal Bowel Sounds] : normal bowel sounds [Normal Posterior Cervical Nodes] : no posterior cervical lymphadenopathy [Normal Anterior Cervical Nodes] : no anterior cervical lymphadenopathy [No Joint Swelling] : no joint swelling [Grossly Normal Strength/Tone] : grossly normal strength/tone [No Rash] : no rash [Coordination Grossly Intact] : coordination grossly intact [Normal Gait] : normal gait [Normal Affect] : the affect was normal [Normal Insight/Judgement] : insight and judgment were intact [de-identified] : mildly obese [de-identified] : lungs CTA B with regular breathing and with cough today, no w/c/r and fairly good AE

## 2022-08-22 NOTE — ASSESSMENT
[FreeTextEntry1] : KEYLA KEMP is a 76 year old female here for follow up on medical issues as noted above.\par \par She has a history of type 2 diabetes, hypertension, osteoarthritis, hypercholesterolemia, and hypothyroidism and is an active smoker with known RLL pulm nodule. She also had a positive Cologuard test 7/2022 that requires further eval. She also had incidental abnormal findings on 4/2022 Chest CT (liver, adrenal gland, see above) and was recommended to have abd CT for further eval but has not yet done this as did not realize she needed to do this; we revd Chest CT 4/2022 results again today and she now understands need to do Abd CT \par \par

## 2022-08-22 NOTE — PLAN
[FreeTextEntry1] : Continue all medications as prescribed. Check labs as above. Will adjust any medications based upon lab results.\par \par Reviewed and reminded her to sched Chest CT (ordered by Dr. Rubio to f/u RLL pulm nodule) and PFTs and GI appt for further eval of her positive Cologuard test (needs a colonoscopy). Also revd/reminded her that she is due for abd CT to further eval the incidental findings (eg lesions in liver, adrenal gland) on Chest CT baseline eval in 4/2022 (she has not yet done this as did not realize needed to so we revd the 4/2022 Chest CT results again and revd why abd CT is recommended and she will sched to be done in near future with Chect CT f/u. \par \par Revd/recommended Shingrix and she will consider. She will get flu vacc today at [address] as she is willing to get this but we do not have it yet at [address].\par \par Discussed clean eating (eg Mediterranean style eating plan) and regular exercise/staying as physically active as possible. Include balance exercises and strength training and core strengthening exercises for bone health and to decrease risk for falls. \par \par Annual ophtho visits needed for DM revd today and she states she is UTD\par \par Reviewed importance of good self care (eg meditation, yoga, adequate rest, regular exercise, magnesium, clean eating etc).\par \par Cont same meds/doses. BP goal is <=135/85 on average on home checks. \par \par LDL goal <=100 ideally. Reviewed risks/benefits of statin med. Recommended excellent hydration +/- co Q10 (100-400 mg daily) to decrease risk for statin related myalgias. \par \par Quit smoking urged and revd risks of contd smoking. She is not ready to quit yet but has reduced to 4-5 cigs per day and agrees to try to not increase. No safe level of smoking but reducing number of cigs per day as low as poss until ready to quit is good. \par \par F/u with Dr. Apple and Dr. Rubio as recommended by them. Also revd need for annual ophtho eval since she has DM and she is aware and states she is UTD\par \par Next RPA visit (chol, HTN, DM etc) and repeat fasting labs due in 3-4 months or as needed based on these lab results.\par

## 2022-08-22 NOTE — HEALTH RISK ASSESSMENT
[No falls in past year] : Patient reported no falls in the past year [0] : 2) Feeling down, depressed, or hopeless: Not at all (0) [PHQ-2 Negative - No further assessment needed] : PHQ-2 Negative - No further assessment needed [[id number]] : 0

## 2022-08-22 NOTE — HISTORY OF PRESENT ILLNESS
[FreeTextEntry1] : KEYLA KEMP is a 76 year old female here for a follow up visit.\par  [de-identified] : Santa has h/o HTN, high cholesterol, hypothyroidism, IFG now progressed to DM (2022) and she is an active smoker with pulm nodule (3 mm RLL Spring 2022). \par \par Takes meds consistently and tolerates them well. BPs not checked at home as causes her too much stress/anxiety to do so. UTD on f/u with Dr. Apple (Spring 2022)\par \par Still smoking and not ready to quit at this time but has stuck with smoking only a few cigs per day and is willing to stick with this lower level of smoking until hopefully some point in the future when she might consider quitting. She saw Dr. 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 Plans to start using stationary bike and eating smaller portions/ food choices. Last A1C was 6.9% in 3/2022. \par \par Also in 7/2022 her Cologuard CRC screen was positive and she was referred for Colonoscopy. She states she has not yet scheduled GI appt but plans to do so soon\par \par Also in 4/2022 liver cysts and left adrenal gland thickening incidentally noted on Chest CT and I ordered abd CT for further eval but she has not yet done this. \par \par She updates me today that she had ophtho eval in past 6 mos or so with Dr. Millan. \par

## 2022-08-25 ENCOUNTER — RX RENEWAL (OUTPATIENT)
Age: 77
End: 2022-08-25

## 2022-08-25 LAB
ALBUMIN SERPL ELPH-MCNC: 4.6 G/DL
ALP BLD-CCNC: 107 U/L
ALT SERPL-CCNC: 18 U/L
ANION GAP SERPL CALC-SCNC: 12 MMOL/L
AST SERPL-CCNC: 14 U/L
BILIRUB SERPL-MCNC: 0.5 MG/DL
BUN SERPL-MCNC: 19 MG/DL
CALCIUM SERPL-MCNC: 9.6 MG/DL
CHLORIDE SERPL-SCNC: 103 MMOL/L
CHOLEST SERPL-MCNC: 129 MG/DL
CO2 SERPL-SCNC: 24 MMOL/L
CREAT SERPL-MCNC: 0.67 MG/DL
CREAT SPEC-SCNC: 125 MG/DL
EGFR: 89 ML/MIN/1.73M2
ESTIMATED AVERAGE GLUCOSE: 148 MG/DL
GLUCOSE SERPL-MCNC: 152 MG/DL
HBA1C MFR BLD HPLC: 6.8 %
HDLC SERPL-MCNC: 44 MG/DL
LDLC SERPL CALC-MCNC: 55 MG/DL
MICROALBUMIN 24H UR DL<=1MG/L-MCNC: <1.2 MG/DL
MICROALBUMIN/CREAT 24H UR-RTO: NORMAL MG/G
NONHDLC SERPL-MCNC: 85 MG/DL
POTASSIUM SERPL-SCNC: 4.3 MMOL/L
PROT SERPL-MCNC: 7 G/DL
SODIUM SERPL-SCNC: 139 MMOL/L
TRIGL SERPL-MCNC: 149 MG/DL
TSH SERPL-ACNC: 3.57 UIU/ML

## 2022-08-26 ENCOUNTER — NON-APPOINTMENT (OUTPATIENT)
Age: 77
End: 2022-08-26

## 2022-08-30 ENCOUNTER — NON-APPOINTMENT (OUTPATIENT)
Age: 77
End: 2022-08-30

## 2022-09-06 ENCOUNTER — APPOINTMENT (OUTPATIENT)
Dept: CT IMAGING | Facility: CLINIC | Age: 77
End: 2022-09-06

## 2022-09-06 ENCOUNTER — OUTPATIENT (OUTPATIENT)
Dept: OUTPATIENT SERVICES | Facility: HOSPITAL | Age: 77
LOS: 1 days | End: 2022-09-06
Payer: MEDICARE

## 2022-09-06 DIAGNOSIS — K76.89 OTHER SPECIFIED DISEASES OF LIVER: ICD-10-CM

## 2022-09-06 PROCEDURE — 74178 CT ABD&PLV WO CNTR FLWD CNTR: CPT | Mod: MG

## 2022-09-06 PROCEDURE — 74178 CT ABD&PLV WO CNTR FLWD CNTR: CPT | Mod: 26,MG

## 2022-09-06 PROCEDURE — 71250 CT THORAX DX C-: CPT | Mod: 26,MH

## 2022-09-06 PROCEDURE — G1004: CPT

## 2022-09-06 PROCEDURE — 71250 CT THORAX DX C-: CPT

## 2022-09-08 ENCOUNTER — FORM ENCOUNTER (OUTPATIENT)
Age: 77
End: 2022-09-08

## 2022-09-09 ENCOUNTER — NON-APPOINTMENT (OUTPATIENT)
Age: 77
End: 2022-09-09

## 2022-09-13 ENCOUNTER — NON-APPOINTMENT (OUTPATIENT)
Age: 77
End: 2022-09-13

## 2022-09-13 ENCOUNTER — FORM ENCOUNTER (OUTPATIENT)
Age: 77
End: 2022-09-13

## 2022-10-04 NOTE — PROGRESS NOTE ADULT - ASSESSMENT
72 yo female with PMH of HTN, an episode of mild vertigo 2 weeks ago; presented to ED with c/o sudden onset dizziness, lightheadedness,  head felt heavy like it was spinning, associated with nauseous and vomited; sx worsened with head movements. In the ED, symptoms improved, she continued to have lightheadedness.     Pt was evaluated for stroke; CT head/neck angio did not reveal territorial stroke, thrombus of VB stenosis, MI 50 % stenosis was noted.      # Most likely BPPV; now resolved;     - Pt educated about postural changes.  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

## 2022-10-12 ENCOUNTER — APPOINTMENT (OUTPATIENT)
Dept: MAMMOGRAPHY | Facility: CLINIC | Age: 77
End: 2022-10-12

## 2022-10-12 ENCOUNTER — OUTPATIENT (OUTPATIENT)
Dept: OUTPATIENT SERVICES | Facility: HOSPITAL | Age: 77
LOS: 1 days | End: 2022-10-12
Payer: MEDICARE

## 2022-10-12 ENCOUNTER — RESULT REVIEW (OUTPATIENT)
Age: 77
End: 2022-10-12

## 2022-10-12 DIAGNOSIS — Z00.00 ENCOUNTER FOR GENERAL ADULT MEDICAL EXAMINATION WITHOUT ABNORMAL FINDINGS: ICD-10-CM

## 2022-10-12 PROCEDURE — 77063 BREAST TOMOSYNTHESIS BI: CPT

## 2022-10-12 PROCEDURE — 77067 SCR MAMMO BI INCL CAD: CPT

## 2022-10-12 PROCEDURE — 77067 SCR MAMMO BI INCL CAD: CPT | Mod: 26

## 2022-10-12 PROCEDURE — 77063 BREAST TOMOSYNTHESIS BI: CPT | Mod: 26

## 2022-10-17 ENCOUNTER — FORM ENCOUNTER (OUTPATIENT)
Age: 77
End: 2022-10-17

## 2022-10-26 ENCOUNTER — FORM ENCOUNTER (OUTPATIENT)
Age: 77
End: 2022-10-26

## 2022-11-07 ENCOUNTER — FORM ENCOUNTER (OUTPATIENT)
Age: 77
End: 2022-11-07

## 2022-11-10 ENCOUNTER — FORM ENCOUNTER (OUTPATIENT)
Age: 77
End: 2022-11-10

## 2022-11-18 ENCOUNTER — APPOINTMENT (OUTPATIENT)
Dept: OBGYN | Facility: CLINIC | Age: 77
End: 2022-11-18

## 2022-11-18 ENCOUNTER — LABORATORY RESULT (OUTPATIENT)
Age: 77
End: 2022-11-18

## 2022-11-18 ENCOUNTER — APPOINTMENT (OUTPATIENT)
Dept: FAMILY MEDICINE | Facility: CLINIC | Age: 77
End: 2022-11-18

## 2022-11-18 VITALS
WEIGHT: 195 LBS | HEIGHT: 64 IN | DIASTOLIC BLOOD PRESSURE: 70 MMHG | BODY MASS INDEX: 33.29 KG/M2 | SYSTOLIC BLOOD PRESSURE: 120 MMHG

## 2022-11-18 VITALS
DIASTOLIC BLOOD PRESSURE: 64 MMHG | OXYGEN SATURATION: 99 % | TEMPERATURE: 98.8 F | SYSTOLIC BLOOD PRESSURE: 120 MMHG | HEIGHT: 64 IN | WEIGHT: 193 LBS | BODY MASS INDEX: 32.95 KG/M2 | HEART RATE: 80 BPM

## 2022-11-18 DIAGNOSIS — R35.0 FREQUENCY OF MICTURITION: ICD-10-CM

## 2022-11-18 PROCEDURE — 36415 COLL VENOUS BLD VENIPUNCTURE: CPT

## 2022-11-18 PROCEDURE — 99213 OFFICE O/P EST LOW 20 MIN: CPT

## 2022-11-18 PROCEDURE — 99214 OFFICE O/P EST MOD 30 MIN: CPT | Mod: 25

## 2022-11-18 RX ORDER — AMOXICILLIN 500 MG/1
500 CAPSULE ORAL
Qty: 12 | Refills: 0 | Status: DISCONTINUED | COMMUNITY
Start: 2022-09-14

## 2022-11-18 RX ORDER — PREDNISONE 50 MG/1
50 TABLET ORAL
Qty: 5 | Refills: 0 | Status: DISCONTINUED | COMMUNITY
Start: 2022-07-18

## 2022-11-18 RX ORDER — DOXYCYCLINE HYCLATE 100 MG/1
100 CAPSULE ORAL
Qty: 14 | Refills: 0 | Status: DISCONTINUED | COMMUNITY
Start: 2022-07-18

## 2022-11-18 NOTE — HISTORY OF PRESENT ILLNESS
[FreeTextEntry1] : KEYLA KEMP is a 77 year old female here for a follow up visit.\par  [de-identified] : She has a history of hypertension, high cholesterol, hypothyroidism, impaired fasting glucose now progressed to DM, and she is an active smoker with pulm nodule (stable on several Chest CT so far and being monitored/followed up by Dr. Rubio).\par \emily Takes meds consistently and tolerates them well. BPs not checked at home as causes her too much stress/anxiety to do so. UTD on f/u with Dr. Apple (Spring 2022)\emily fagan Still smoking and not ready to quit at this time but has stuck with smoking only a few cigs (approx 5) per day and is willing to stick with this lower level of smoking until hopefully some point in the future when she might consider quitting. She is UTD on f/u and PFTs with Dr. Ramiro fagan Also in 7/2022 her Cologuard CRC screen was positive and she was referred for Colonoscopy. She states she has still not yet scheduled GI appt but plans to do so soon\emily fagan Rafita is UTD on ophtho eval within past year with Dr. Millan.

## 2022-11-18 NOTE — PHYSICAL EXAM
[No Acute Distress] : no acute distress [Well Developed] : well developed [Well-Appearing] : well-appearing [Normal Sclera/Conjunctiva] : normal sclera/conjunctiva [EOMI] : extraocular movements intact [Normal Outer Ear/Nose] : the outer ears and nose were normal in appearance [No JVD] : no jugular venous distention [No Lymphadenopathy] : no lymphadenopathy [Supple] : supple [Thyroid Normal, No Nodules] : the thyroid was normal and there were no nodules present [No Respiratory Distress] : no respiratory distress  [No Accessory Muscle Use] : no accessory muscle use [Clear to Auscultation] : lungs were clear to auscultation bilaterally [Normal Rate] : normal rate  [Regular Rhythm] : with a regular rhythm [Normal S1, S2] : normal S1 and S2 [No Murmur] : no murmur heard [No Carotid Bruits] : no carotid bruits [Pedal Pulses Present] : the pedal pulses are present [No Edema] : there was no peripheral edema [No Extremity Clubbing/Cyanosis] : no extremity clubbing/cyanosis [Soft] : abdomen soft [Non Tender] : non-tender [Non-distended] : non-distended [Normal Bowel Sounds] : normal bowel sounds [Normal Posterior Cervical Nodes] : no posterior cervical lymphadenopathy [Normal Anterior Cervical Nodes] : no anterior cervical lymphadenopathy [No Joint Swelling] : no joint swelling [Grossly Normal Strength/Tone] : grossly normal strength/tone [No Rash] : no rash [Coordination Grossly Intact] : coordination grossly intact [Normal Gait] : normal gait [Normal Affect] : the affect was normal [Normal Insight/Judgement] : insight and judgment were intact [de-identified] : mildly obese [de-identified] : lungs CTA B with regular breathing and with cough today, no w/c/r and fairly good AE

## 2022-11-18 NOTE — HEALTH RISK ASSESSMENT
[No falls in past year] : Patient reported no falls in the past year [0] : 2) Feeling down, depressed, or hopeless: Not at all (0) [PHQ-2 Negative - No further assessment needed] : PHQ-2 Negative - No further assessment needed [[id number]] : 0

## 2022-11-18 NOTE — DISCUSSION/SUMMARY
[FreeTextEntry1] : Health Maintenance:\par no Pap needed. Cervix visualized and palpated. \par Mammo 10/12/22\par \par Overactive bladder:\par urine cx today\par no sign of DANIELA\par Offered and explained RBA to muscarinic receptor medications\par declining Mybetriq start now, Gemtessa may be better\par Offered follow up with Dr Del Toro. Will consider in new year.\par \par Suggest telemed follow up next month. (after meeting Sir Gutierrez)\par

## 2022-11-18 NOTE — REVIEW OF SYSTEMS
[Recent Change In Weight] : ~T recent weight change [Cough] : cough [Joint Pain] : joint pain [Joint Stiffness] : joint stiffness [Negative] : Heme/Lymph [Fever] : no fever [Chills] : no chills [Fatigue] : no fatigue [Shortness Of Breath] : no shortness of breath [Wheezing] : no wheezing [Dyspnea on Exertion] : no dyspnea on exertion [Muscle Pain] : no muscle pain [FreeTextEntry2] : wgt gain over past few yrs but planning to work on  eating/exercise now  [FreeTextEntry6] : intermittent cough on and off for years, due in part to allergies/post nasal drip also related to long term tobacco use, seeing Dr. Rubio for eval  [FreeTextEntry9] : stiff achy joints due to OA

## 2022-11-18 NOTE — PLAN
[FreeTextEntry1] : Continue all medications as prescribed. Check labs as above. Will adjust any medications based upon lab results.\par \par Discussed clean eating (eg Mediterranean style eating plan) and regular exercise/staying as physically active as possible. Include balance exercises and strength training and core strengthening exercises for bone health and to decrease risk for falls. \par \par We revd that she is overdue for colonoscopy to further evaluate positive Cologuard test. She did not get to schedule this yet as has been busy and we again revd the importance of following through with colonoscopy and she promises she will sched for first part of 2023 once she returns from winter trip to [place]\par \par Cont same meds/doses. BP goal is <=135/85 on average on home checks. \par \par LDL goal <=100 ideally. Reviewed risks/benefits of statin med. Recommended excellent hydration +/- co Q10 (100-400 mg daily) to decrease risk for statin related myalgias. \par \par Quit smoking urged and revd risks of contd smoking. She is not ready to quit yet but has reduced to 5 cigs per day and agrees to try to not increase. No safe level of smoking but reducing number of cigs per day as low as poss until ready to quit is good. \par \par F/u with Dr. Apple and Dr. Rubio as recommended by them. Also revd need for annual ophtho eval since she has DM and she is aware and states she is UTD\par \par Reviewed importance of good self care (eg meditation, yoga, adequate rest, regular exercise, magnesium, clean eating etc).\par \par Next CPE and RPA visit (chol, HTN, DM etc) and repeat fasting labs due in 4-6 months or as needed based on these lab results.

## 2022-11-18 NOTE — PHYSICAL EXAM
[Appropriately responsive] : appropriately responsive [Alert] : alert [No Acute Distress] : no acute distress [No Lymphadenopathy] : no lymphadenopathy [Soft] : soft [Non-tender] : non-tender [Non-distended] : non-distended [No HSM] : No HSM [No Lesions] : no lesions [No Mass] : no mass [Oriented x3] : oriented x3 [Examination Of The Breasts] : a normal appearance [No Masses] : no breast masses were palpable [Labia Majora] : normal [Labia Minora] : normal [Normal] : normal [Tenderness] : nontender [Enlarged ___ wks] : not enlarged [Uterine Adnexae] : normal [FreeTextEntry3] : No obvious hypermobility  of urethra [FreeTextEntry6] : Will supported while resting [FreeTextEntry9] : Tuna negative

## 2022-11-18 NOTE — HISTORY OF PRESENT ILLNESS
[FreeTextEntry1] : 76 yo G5 P 5 with LMP age 46\par \par CC: urinary frequency and overactive bladder\par       when travelling to [place] needs to make frequent stops\par        will lose urine in BR prior to being ready\par \par OB:\par  x 5 - wt 5-8#\par \par No PMB, No HRT

## 2022-11-20 LAB
ALBUMIN SERPL ELPH-MCNC: 4.5 G/DL
ALP BLD-CCNC: 106 U/L
ALT SERPL-CCNC: 18 U/L
ANION GAP SERPL CALC-SCNC: 10 MMOL/L
APPEARANCE: CLEAR
AST SERPL-CCNC: 15 U/L
BILIRUB SERPL-MCNC: 0.4 MG/DL
BILIRUBIN URINE: NEGATIVE
BLOOD URINE: NEGATIVE
BUN SERPL-MCNC: 18 MG/DL
CALCIUM SERPL-MCNC: 9.5 MG/DL
CHLORIDE SERPL-SCNC: 106 MMOL/L
CHOLEST SERPL-MCNC: 120 MG/DL
CO2 SERPL-SCNC: 25 MMOL/L
COLOR: YELLOW
CREAT SERPL-MCNC: 0.68 MG/DL
CREAT SPEC-SCNC: 12 MG/DL
EGFR: 89 ML/MIN/1.73M2
ESTIMATED AVERAGE GLUCOSE: 148 MG/DL
GLUCOSE QUALITATIVE U: NEGATIVE
GLUCOSE SERPL-MCNC: 129 MG/DL
HBA1C MFR BLD HPLC: 6.8 %
HDLC SERPL-MCNC: 44 MG/DL
KETONES URINE: NEGATIVE
LDLC SERPL CALC-MCNC: 55 MG/DL
LEUKOCYTE ESTERASE URINE: ABNORMAL
MICROALBUMIN 24H UR DL<=1MG/L-MCNC: <1.2 MG/DL
MICROALBUMIN/CREAT 24H UR-RTO: NORMAL MG/G
NITRITE URINE: NEGATIVE
NONHDLC SERPL-MCNC: 75 MG/DL
PH URINE: 7
POTASSIUM SERPL-SCNC: 5 MMOL/L
PROT SERPL-MCNC: 7.1 G/DL
PROTEIN URINE: NEGATIVE
SODIUM SERPL-SCNC: 142 MMOL/L
SPECIFIC GRAVITY URINE: 1.02
TRIGL SERPL-MCNC: 103 MG/DL
TSH SERPL-ACNC: 2.7 UIU/ML
UROBILINOGEN URINE: NORMAL

## 2022-11-21 ENCOUNTER — NON-APPOINTMENT (OUTPATIENT)
Age: 77
End: 2022-11-21

## 2022-11-23 LAB — BACTERIA UR CULT: ABNORMAL

## 2022-12-16 ENCOUNTER — RX RENEWAL (OUTPATIENT)
Age: 77
End: 2022-12-16

## 2023-01-05 ENCOUNTER — RX RENEWAL (OUTPATIENT)
Age: 78
End: 2023-01-05

## 2023-04-10 ENCOUNTER — APPOINTMENT (OUTPATIENT)
Dept: FAMILY MEDICINE | Facility: CLINIC | Age: 78
End: 2023-04-10
Payer: MEDICARE

## 2023-04-10 VITALS
WEIGHT: 189 LBS | BODY MASS INDEX: 32.27 KG/M2 | HEIGHT: 64 IN | HEART RATE: 59 BPM | SYSTOLIC BLOOD PRESSURE: 122 MMHG | OXYGEN SATURATION: 98 % | TEMPERATURE: 98.5 F | DIASTOLIC BLOOD PRESSURE: 70 MMHG

## 2023-04-10 PROCEDURE — 36415 COLL VENOUS BLD VENIPUNCTURE: CPT

## 2023-04-10 PROCEDURE — G0439: CPT

## 2023-04-10 RX ORDER — HYDROCORTISONE 25 MG/G
2.5 CREAM TOPICAL
Qty: 28 | Refills: 0 | Status: DISCONTINUED | COMMUNITY
Start: 2022-07-28 | End: 2023-04-10

## 2023-04-10 RX ORDER — ALBUTEROL SULFATE 90 UG/1
108 (90 BASE) AEROSOL, METERED RESPIRATORY (INHALATION)
Refills: 0 | Status: DISCONTINUED | COMMUNITY
End: 2023-04-10

## 2023-04-10 RX ORDER — FLUTICASONE PROPIONATE 50 UG/1
50 SPRAY, METERED NASAL
Qty: 16 | Refills: 0 | Status: DISCONTINUED | COMMUNITY
Start: 2022-09-09 | End: 2023-04-10

## 2023-04-10 RX ORDER — TRIAMCINOLONE ACETONIDE 55 MCG
55 AEROSOL WITH ADAPTER (GRAM) NASAL DAILY
Refills: 0 | Status: DISCONTINUED | COMMUNITY
End: 2023-04-10

## 2023-04-10 RX ORDER — B-COMPLEX WITH VITAMIN C
TABLET ORAL
Refills: 0 | Status: DISCONTINUED | COMMUNITY
End: 2023-04-10

## 2023-04-10 RX ORDER — AMOXICILLIN 500 MG/1
500 TABLET, FILM COATED ORAL
Qty: 21 | Refills: 0 | Status: DISCONTINUED | COMMUNITY
Start: 2022-11-23 | End: 2023-04-10

## 2023-04-10 RX ORDER — FLUOCINONIDE 0.5 MG/G
0.05 CREAM TOPICAL TWICE DAILY
Qty: 60 | Refills: 2 | Status: DISCONTINUED | COMMUNITY
Start: 2021-06-16 | End: 2023-04-10

## 2023-04-10 RX ORDER — TRIAMCINOLONE ACETONIDE 1 MG/G
0.1 OINTMENT TOPICAL TWICE DAILY
Qty: 1 | Refills: 1 | Status: DISCONTINUED | COMMUNITY
Start: 2018-10-29 | End: 2023-04-10

## 2023-04-10 NOTE — PHYSICAL EXAM
[No Acute Distress] : no acute distress [Well Developed] : well developed [Well-Appearing] : well-appearing [Normal Sclera/Conjunctiva] : normal sclera/conjunctiva [EOMI] : extraocular movements intact [No JVD] : no jugular venous distention [Normal Outer Ear/Nose] : the outer ears and nose were normal in appearance [No Lymphadenopathy] : no lymphadenopathy [Supple] : supple [Thyroid Normal, No Nodules] : the thyroid was normal and there were no nodules present [No Respiratory Distress] : no respiratory distress  [No Accessory Muscle Use] : no accessory muscle use [Clear to Auscultation] : lungs were clear to auscultation bilaterally [Regular Rhythm] : with a regular rhythm [Normal Rate] : normal rate  [Normal S1, S2] : normal S1 and S2 [No Murmur] : no murmur heard [No Carotid Bruits] : no carotid bruits [Pedal Pulses Present] : the pedal pulses are present [No Edema] : there was no peripheral edema [No Extremity Clubbing/Cyanosis] : no extremity clubbing/cyanosis [Soft] : abdomen soft [Non Tender] : non-tender [Non-distended] : non-distended [Normal Bowel Sounds] : normal bowel sounds [No Joint Swelling] : no joint swelling [Grossly Normal Strength/Tone] : grossly normal strength/tone [No Rash] : no rash [Coordination Grossly Intact] : coordination grossly intact [Normal Gait] : normal gait [Normal Affect] : the affect was normal [Normal Insight/Judgement] : insight and judgment were intact [de-identified] : mildly obese, but wgt decreased by 6# since 11/2022, mod freq clearing of throat noted today  [de-identified] : lungs CTA B with regular breathing and with cough today , no w/c/r and fairly good AE

## 2023-04-10 NOTE — HEALTH RISK ASSESSMENT
[No falls in past year] : Patient reported no falls in the past year [0] : 2) Feeling down, depressed, or hopeless: Not at all (0) [PHQ-2 Negative - No further assessment needed] : PHQ-2 Negative - No further assessment needed [Current] : Current [20 or more] : 20 or more [[id number]] : 0

## 2023-04-10 NOTE — ASSESSMENT
[FreeTextEntry1] : KEYLA KEMP is a 77 year old female here for a physical exam.  She is also here to follow up on medical issues as noted above.\par \par Keyla has h/o HTN, high cholesterol, diabetes, hypothyroidism, and she is an active smoker.

## 2023-04-10 NOTE — HISTORY OF PRESENT ILLNESS
[FreeTextEntry1] : KEYLA KEMP is a 77 year old female here for a physical exam.\par  [de-identified] : Her last physical exam was last year\par \par Vaccines:\par Tetanus is up to date; last 12/9/2015 \par Pneumococcal vaccination is up to date\par Shingrix is NOT up to date\par COVID vaccine is up to date\par \par Her last dentist visit was less than one year ago\par Her last eye doctor appointment was less than one year ago, Dr. Millan\par Her last dermatologist visit was over one year ago (Dr. Kent) \par \par GYN visit is up to date; last 11/18/2022 (Dr. Warren)\par Mammogram is up to date; last 10/12/2022 stable/benign\par Colon cancer screening is NOT up to date, but she has GI appt sched Spring 2023 with Dr. Marte\par DEXA is up to date; last 7/30/2020 wnl (since was wnl can defer up to 5 yrs to repeat or as otherwise recommended by gyn)\par \par Her diet is healthy overall-- trying to eat less sugar\par Exercise: doing more walking in recent months\par \par She has a history of hypertension, high cholesterol, hypothyroidism, impaired fasting glucose now progressed to DM, and she is an active smoker with pulm nodule (stable on several Chest CT so far and being monitored/followed up by Dr. Rubio).\par \par UTD on f/u with her specialists Dr. Apple (card), Dr. Rubio (pulm) \par \par Still smoking and not ready to quit at this time but has stuck with smoking only a few cigs (approx 5) per day and is willing to stick with this lower level of smoking until hopefully some point in the future when she might consider quitting. She is noticing more cough and phlegm (white/clear) and thinks some of this is due to spring allergy season\par \par Also in 7/2022 her Cologuard CRC screen was positive and she was referred for Colonoscopy. She states she has still not completed this but she has now scheduled NPA consult visit with Dr. Marte for early May 2023 .\par \par She is under a lot of stress due to ongoing family issues/stress and also her partner Oliver was in hosp recently severely ill due to pneumonia and complications and only got home a few days ago and she is trying to help him with his recovery.

## 2023-04-10 NOTE — PLAN
[FreeTextEntry1] : Continue all medications as prescribed. Check labs as above. Will adjust any medications based upon lab results.\par \par Reviewed age-appropriate preventive screening tests with patient. UTD on gyn visits and mammograms. I consider her still to be UTD on DEXA as although last DEXA was in 7/2020 it was wnl so she can defer up to around 5 yrs (Summer 2025) before repeating, unless otherwise advised by gyn or if any new issues (eg significant fracture etc). \par \par She is due for colonoscopy to evaluate her positive Cologuard test and we again revd the importance of following through with this. I m glad to see she is scheduled with Dr. Marte for near future for initial consult and she agrees to schedule colonoscopy for near future. \par \par Revd/recommended Shingrix series. She is considering it \par \par BP goal is <=135/85 on average on home checks. \par \par LDL goal <=100 ideally. Reviewed risks/benefits of statin med. Recommended excellent hydration +/- co Q10 (100-400 mg daily) to decrease risk for statin related myalgias. \par \par Quit smoking urged and revd risks of contd smoking. She is not ready to quit yet but has reduced to 5 cigs per day and agrees to try to not increase. No safe level of smoking but reducing number of cigs per day as low as poss until ready to quit is good. \par \par F/u with Dr. Apple and Dr. Rubio (reminded her she is due to f/u with Dr. Rubio this Spring so recommended she call to schedule soon) as recommended by them. Also revd need for annual ophtho eval since she has DM and she is aware and states she is UTD\par \par Reviewed importance of good self care (eg meditation, yoga, adequate rest, regular exercise, magnesium, clean eating etc).\par \par Follow up for next physical in one year. Schedule RPA visit (chol, HTN, DM etc) and repeat fasting labs in about 4-6 months or as needed based on these lab results.\par

## 2023-04-10 NOTE — REVIEW OF SYSTEMS
[Recent Change In Weight] : ~T recent weight change [Cough] : cough [Joint Pain] : joint pain [Joint Stiffness] : joint stiffness [Negative] : Heme/Lymph [Fever] : no fever [Chills] : no chills [Fatigue] : no fatigue [Shortness Of Breath] : no shortness of breath [Wheezing] : no wheezing [Dyspnea on Exertion] : no dyspnea on exertion [Muscle Pain] : no muscle pain [FreeTextEntry2] : Purposeful wgt loss in past few mos with  eating/exercise  [FreeTextEntry6] : intermittent cough on and off for years, due in part to allergies/post nasal drip also related to long term tobacco use, seeing Dr. Rubio for eval  [FreeTextEntry9] : stiff achy joints due to OA

## 2023-04-11 LAB
ALBUMIN SERPL ELPH-MCNC: 4.4 G/DL
ALP BLD-CCNC: 100 U/L
ALT SERPL-CCNC: 14 U/L
ANION GAP SERPL CALC-SCNC: 12 MMOL/L
AST SERPL-CCNC: 15 U/L
BASOPHILS # BLD AUTO: 0.04 K/UL
BASOPHILS NFR BLD AUTO: 0.5 %
BILIRUB SERPL-MCNC: 0.5 MG/DL
BUN SERPL-MCNC: 18 MG/DL
CALCIUM SERPL-MCNC: 9.6 MG/DL
CHLORIDE SERPL-SCNC: 105 MMOL/L
CHOLEST SERPL-MCNC: 108 MG/DL
CO2 SERPL-SCNC: 26 MMOL/L
CREAT SERPL-MCNC: 0.65 MG/DL
EGFR: 90 ML/MIN/1.73M2
EOSINOPHIL # BLD AUTO: 0.14 K/UL
EOSINOPHIL NFR BLD AUTO: 1.9 %
ESTIMATED AVERAGE GLUCOSE: 146 MG/DL
FERRITIN SERPL-MCNC: 52 NG/ML
GLUCOSE SERPL-MCNC: 137 MG/DL
HBA1C MFR BLD HPLC: 6.7 %
HCT VFR BLD CALC: 40.1 %
HDLC SERPL-MCNC: 41 MG/DL
HGB BLD-MCNC: 12.2 G/DL
IMM GRANULOCYTES NFR BLD AUTO: 0.3 %
IRON SATN MFR SERPL: 25 %
IRON SERPL-MCNC: 86 UG/DL
LDLC SERPL CALC-MCNC: 49 MG/DL
LYMPHOCYTES # BLD AUTO: 2.3 K/UL
LYMPHOCYTES NFR BLD AUTO: 30.8 %
MAN DIFF?: NORMAL
MCHC RBC-ENTMCNC: 29.4 PG
MCHC RBC-ENTMCNC: 30.4 GM/DL
MCV RBC AUTO: 96.6 FL
MONOCYTES # BLD AUTO: 0.51 K/UL
MONOCYTES NFR BLD AUTO: 6.8 %
NEUTROPHILS # BLD AUTO: 4.46 K/UL
NEUTROPHILS NFR BLD AUTO: 59.7 %
NONHDLC SERPL-MCNC: 67 MG/DL
PLATELET # BLD AUTO: 200 K/UL
POTASSIUM SERPL-SCNC: 4.7 MMOL/L
PROT SERPL-MCNC: 6.5 G/DL
RBC # BLD: 4.15 M/UL
RBC # FLD: 14.6 %
SODIUM SERPL-SCNC: 143 MMOL/L
TIBC SERPL-MCNC: 340 UG/DL
TRIGL SERPL-MCNC: 91 MG/DL
TSH SERPL-ACNC: 3.6 UIU/ML
UIBC SERPL-MCNC: 254 UG/DL
WBC # FLD AUTO: 7.47 K/UL

## 2023-04-12 ENCOUNTER — NON-APPOINTMENT (OUTPATIENT)
Age: 78
End: 2023-04-12

## 2023-05-04 ENCOUNTER — APPOINTMENT (OUTPATIENT)
Dept: GASTROENTEROLOGY | Facility: CLINIC | Age: 78
End: 2023-05-04
Payer: MEDICARE

## 2023-05-04 VITALS
HEIGHT: 64 IN | HEART RATE: 66 BPM | SYSTOLIC BLOOD PRESSURE: 141 MMHG | WEIGHT: 189 LBS | BODY MASS INDEX: 32.27 KG/M2 | DIASTOLIC BLOOD PRESSURE: 78 MMHG

## 2023-05-04 DIAGNOSIS — K59.09 OTHER CONSTIPATION: ICD-10-CM

## 2023-05-04 PROCEDURE — 99203 OFFICE O/P NEW LOW 30 MIN: CPT

## 2023-05-04 NOTE — PHYSICAL EXAM

## 2023-05-04 NOTE — HISTORY OF PRESENT ILLNESS
[FreeTextEntry1] : 78yo female for initial screening colonoscopy\par \par She has altered bowel habits with thinner stool and some urgency at times\par \par Also with some mild constipation

## 2023-05-04 NOTE — ASSESSMENT
[FreeTextEntry1] : 76yo female with constipation, altered bowel habits\par \par Will check colonoscopy with 2 day miralax prep\par increase fiber, fluids\par \par Risks and benefits of procedure(s) discussed with patient in detail, including but not limited to, perforation, bleeding, reaction to anesthesia, missed lesions.\par

## 2023-05-16 ENCOUNTER — APPOINTMENT (OUTPATIENT)
Dept: INTERNAL MEDICINE | Facility: CLINIC | Age: 78
End: 2023-05-16
Payer: MEDICARE

## 2023-05-16 ENCOUNTER — FORM ENCOUNTER (OUTPATIENT)
Age: 78
End: 2023-05-16

## 2023-05-16 VITALS
SYSTOLIC BLOOD PRESSURE: 111 MMHG | WEIGHT: 187 LBS | DIASTOLIC BLOOD PRESSURE: 67 MMHG | HEIGHT: 63 IN | HEART RATE: 65 BPM | OXYGEN SATURATION: 98 % | BODY MASS INDEX: 33.13 KG/M2 | TEMPERATURE: 97.8 F | RESPIRATION RATE: 18 BRPM

## 2023-05-16 PROCEDURE — 94729 DIFFUSING CAPACITY: CPT

## 2023-05-16 PROCEDURE — ZZZZZ: CPT

## 2023-05-16 PROCEDURE — 99214 OFFICE O/P EST MOD 30 MIN: CPT | Mod: 25

## 2023-05-16 PROCEDURE — 94060 EVALUATION OF WHEEZING: CPT

## 2023-05-16 PROCEDURE — 94727 GAS DIL/WSHOT DETER LNG VOL: CPT

## 2023-05-16 PROCEDURE — G0447 BEHAVIOR COUNSEL OBESITY 15M: CPT | Mod: 59

## 2023-05-16 PROCEDURE — 99406 BEHAV CHNG SMOKING 3-10 MIN: CPT

## 2023-05-16 RX ORDER — BETAMETHASONE DIPROPIONATE 0.5 MG/G
0.05 OINTMENT TOPICAL
Qty: 1 | Refills: 1 | Status: DISCONTINUED | COMMUNITY
Start: 2022-01-24 | End: 2023-05-16

## 2023-05-16 RX ORDER — HALOBETASOL PROPIONATE 0.5 MG/G
0.05 OINTMENT TOPICAL
Qty: 100 | Refills: 0 | Status: DISCONTINUED | COMMUNITY
Start: 2022-10-13 | End: 2023-05-16

## 2023-05-16 RX ORDER — CALCIPOTRIENE 50 UG/G
0.01 CREAM TOPICAL
Qty: 120 | Refills: 0 | Status: DISCONTINUED | COMMUNITY
Start: 2023-04-03 | End: 2023-05-16

## 2023-05-16 NOTE — PROCEDURE
[FreeTextEntry1] : Full pulmonary function testing today shows a mild restrictive ventilatory deficit with an FVC of 1.82 or 74% predicted.  FEV1 improves 7% after nebulized bronchodilator.  TLC is mildly reduced.  Diffusing capacity is mildly reduced but normal when corrected for alveolar volume.  Oxygen saturation is 98% on room air.

## 2023-05-16 NOTE — ASSESSMENT
[FreeTextEntry1] : #1  Current cigarette smoker.  Patient was counseled  again on cessation today.  See above.\par \par #2  Abnormal CT scan of chest on 9/6/22 showing minimal patchy bilateral upper lobe opacities and 3 mm right lower lobe nodule which are grossly unchanged from scan of April 11, 2022.  A 6-month following CT scan of chest was recommended.  Patient has not yet had the following study and this test was again ordered for her today.\par \par #3  Mild restrictive lung disease.  BMI 33.13.  Patient was counseled on a healthy weight reduction diet today and regular exercise.\par \par #4 HTN.  Patient will continue current medical regimen as noted above.  Continue following with PCP.\par \par Return visit in 6 months for following pulmonary appointment or as needed.

## 2023-05-16 NOTE — PHYSICAL EXAM
[No Acute Distress] : no acute distress [Normal Oropharynx] : normal oropharynx [Normal Appearance] : normal appearance [No Neck Mass] : no neck mass [Normal Rate/Rhythm] : normal rate/rhythm [Normal S1, S2] : normal s1, s2 [No Murmurs] : no murmurs [No Resp Distress] : no resp distress [Clear to Auscultation Bilaterally] : clear to auscultation bilaterally [No Abnormalities] : no abnormalities [Benign] : benign [Normal Gait] : normal gait [No Clubbing] : no clubbing [No Cyanosis] : no cyanosis [No Edema] : no edema [Normal Color/ Pigmentation] : normal color/ pigmentation [No Focal Deficits] : no focal deficits [Oriented x3] : oriented x3 [Normal Affect] : normal affect [TextBox_68] : Rhonchi improved after deep cough

## 2023-05-16 NOTE — COUNSELING
[Cessation strategies including cessation program discussed] : Cessation strategies including cessation program discussed [Use of nicotine replacement therapies and other medications discussed] : Use of nicotine replacement therapies and other medications discussed [Encouraged to pick a quit date and identify support needed to quit] : Encouraged to pick a quit date and identify support needed to quit [Yes] : Willing to quit smoking [FreeTextEntry1] : 7 [Potential consequences of obesity discussed] : Potential consequences of obesity discussed [Encouraged to increase physical activity] : Encouraged to increase physical activity [Decrease Portions] : decrease portions [____ min/wk Activity] : [unfilled] min/wk activity [FreeTextEntry2] : healthy foods [FreeTextEntry4] : 15

## 2023-05-16 NOTE — HISTORY OF PRESENT ILLNESS
[TextBox_4] : This is a return pulmonary appointment for this 77-year-old female.  She smoked cigarettes for over pack 20 pack years and continues to smoke about 5 cigarettes/day.  She was counseled again on cessation today.  See below.  She denies recent coughing but is noticing some white morning sputum.  She is not having hemoptysis, wheezing, or dyspnea on exertion.  She denies fever or chest pains.\par \par Her CT scan of chest on September 6, 2020 showed minimal patchy bilateral upper lobe opacities and a 3 mm right lower lobe nodule which were grossly unchanged compared with the scan of April 11, 2022.   A  6-month following CT scan was recommended.  The patient has not had yet have this study, and this was again ordered today for her.\par \par The patient does have hypertension and is maintained on amlodipine and losartan and metoprolol.\par \par Her BMI is 33.13 and she was counseled on a healthy weight reduction diet today.

## 2023-05-26 ENCOUNTER — APPOINTMENT (OUTPATIENT)
Dept: GASTROENTEROLOGY | Facility: AMBULATORY MEDICAL SERVICES | Age: 78
End: 2023-05-26

## 2023-05-30 ENCOUNTER — APPOINTMENT (OUTPATIENT)
Dept: CT IMAGING | Facility: CLINIC | Age: 78
End: 2023-05-30
Payer: MEDICARE

## 2023-05-30 ENCOUNTER — OUTPATIENT (OUTPATIENT)
Dept: OUTPATIENT SERVICES | Facility: HOSPITAL | Age: 78
LOS: 1 days | End: 2023-05-30
Payer: MEDICARE

## 2023-05-30 DIAGNOSIS — R93.89 ABNORMAL FINDINGS ON DIAGNOSTIC IMAGING OF OTHER SPECIFIED BODY STRUCTURES: ICD-10-CM

## 2023-05-30 PROCEDURE — 71250 CT THORAX DX C-: CPT | Mod: 26,MH

## 2023-05-30 PROCEDURE — 71250 CT THORAX DX C-: CPT

## 2023-06-07 ENCOUNTER — NON-APPOINTMENT (OUTPATIENT)
Age: 78
End: 2023-06-07

## 2023-07-08 ENCOUNTER — INPATIENT (INPATIENT)
Facility: HOSPITAL | Age: 78
LOS: 1 days | Discharge: HOME HEALTH | DRG: 310 | End: 2023-07-10
Attending: STUDENT IN AN ORGANIZED HEALTH CARE EDUCATION/TRAINING PROGRAM | Admitting: INTERNAL MEDICINE
Payer: MEDICARE

## 2023-07-08 VITALS — HEIGHT: 64 IN | WEIGHT: 190.04 LBS

## 2023-07-08 DIAGNOSIS — I48.91 UNSPECIFIED ATRIAL FIBRILLATION: ICD-10-CM

## 2023-07-08 LAB
ALBUMIN SERPL ELPH-MCNC: 3.5 G/DL — SIGNIFICANT CHANGE UP (ref 3.3–5)
ALP SERPL-CCNC: 88 U/L — SIGNIFICANT CHANGE UP (ref 40–120)
ALT FLD-CCNC: 23 U/L — SIGNIFICANT CHANGE UP (ref 12–78)
ANION GAP SERPL CALC-SCNC: 5 MMOL/L — SIGNIFICANT CHANGE UP (ref 5–17)
APPEARANCE UR: ABNORMAL
APTT BLD: 30.9 SEC — SIGNIFICANT CHANGE UP (ref 27.5–35.5)
APTT BLD: > 200 SEC (ref 27.5–35.5)
AST SERPL-CCNC: 14 U/L — LOW (ref 15–37)
BACTERIA # UR AUTO: ABNORMAL
BASOPHILS # BLD AUTO: 0.06 K/UL — SIGNIFICANT CHANGE UP (ref 0–0.2)
BASOPHILS NFR BLD AUTO: 0.6 % — SIGNIFICANT CHANGE UP (ref 0–2)
BILIRUB SERPL-MCNC: 0.5 MG/DL — SIGNIFICANT CHANGE UP (ref 0.2–1.2)
BILIRUB UR-MCNC: ABNORMAL
BUN SERPL-MCNC: 18 MG/DL — SIGNIFICANT CHANGE UP (ref 7–23)
CALCIUM SERPL-MCNC: 9.3 MG/DL — SIGNIFICANT CHANGE UP (ref 8.5–10.1)
CHLORIDE SERPL-SCNC: 110 MMOL/L — HIGH (ref 96–108)
CO2 SERPL-SCNC: 25 MMOL/L — SIGNIFICANT CHANGE UP (ref 22–31)
COLOR SPEC: YELLOW — SIGNIFICANT CHANGE UP
CREAT SERPL-MCNC: 0.86 MG/DL — SIGNIFICANT CHANGE UP (ref 0.5–1.3)
D DIMER BLD IA.RAPID-MCNC: 159 NG/ML DDU — SIGNIFICANT CHANGE UP
DIFF PNL FLD: ABNORMAL
EGFR: 69 ML/MIN/1.73M2 — SIGNIFICANT CHANGE UP
EOSINOPHIL # BLD AUTO: 0.09 K/UL — SIGNIFICANT CHANGE UP (ref 0–0.5)
EOSINOPHIL NFR BLD AUTO: 0.9 % — SIGNIFICANT CHANGE UP (ref 0–6)
EPI CELLS # UR: ABNORMAL
GLUCOSE SERPL-MCNC: 122 MG/DL — HIGH (ref 70–99)
GLUCOSE UR QL: NEGATIVE — SIGNIFICANT CHANGE UP
HCT VFR BLD CALC: 35.1 % — SIGNIFICANT CHANGE UP (ref 34.5–45)
HCT VFR BLD CALC: 41.6 % — SIGNIFICANT CHANGE UP (ref 34.5–45)
HGB BLD-MCNC: 11.6 G/DL — SIGNIFICANT CHANGE UP (ref 11.5–15.5)
HGB BLD-MCNC: 13.7 G/DL — SIGNIFICANT CHANGE UP (ref 11.5–15.5)
HYALINE CASTS # UR AUTO: ABNORMAL /LPF
IMM GRANULOCYTES NFR BLD AUTO: 0.2 % — SIGNIFICANT CHANGE UP (ref 0–0.9)
INR BLD: 1.05 RATIO — SIGNIFICANT CHANGE UP (ref 0.88–1.16)
KETONES UR-MCNC: ABNORMAL
LEUKOCYTE ESTERASE UR-ACNC: ABNORMAL
LIDOCAIN IGE QN: 70 U/L — LOW (ref 73–393)
LYMPHOCYTES # BLD AUTO: 3.05 K/UL — SIGNIFICANT CHANGE UP (ref 1–3.3)
LYMPHOCYTES # BLD AUTO: 32.1 % — SIGNIFICANT CHANGE UP (ref 13–44)
MAGNESIUM SERPL-MCNC: 2.3 MG/DL — SIGNIFICANT CHANGE UP (ref 1.6–2.6)
MCHC RBC-ENTMCNC: 29.8 PG — SIGNIFICANT CHANGE UP (ref 27–34)
MCHC RBC-ENTMCNC: 30 PG — SIGNIFICANT CHANGE UP (ref 27–34)
MCHC RBC-ENTMCNC: 32.9 GM/DL — SIGNIFICANT CHANGE UP (ref 32–36)
MCHC RBC-ENTMCNC: 33 GM/DL — SIGNIFICANT CHANGE UP (ref 32–36)
MCV RBC AUTO: 90.4 FL — SIGNIFICANT CHANGE UP (ref 80–100)
MCV RBC AUTO: 90.7 FL — SIGNIFICANT CHANGE UP (ref 80–100)
MONOCYTES # BLD AUTO: 0.75 K/UL — SIGNIFICANT CHANGE UP (ref 0–0.9)
MONOCYTES NFR BLD AUTO: 7.9 % — SIGNIFICANT CHANGE UP (ref 2–14)
NEUTROPHILS # BLD AUTO: 5.54 K/UL — SIGNIFICANT CHANGE UP (ref 1.8–7.4)
NEUTROPHILS NFR BLD AUTO: 58.3 % — SIGNIFICANT CHANGE UP (ref 43–77)
NITRITE UR-MCNC: NEGATIVE — SIGNIFICANT CHANGE UP
PH UR: 6.5 — SIGNIFICANT CHANGE UP (ref 5–8)
PLATELET # BLD AUTO: 192 K/UL — SIGNIFICANT CHANGE UP (ref 150–400)
PLATELET # BLD AUTO: 246 K/UL — SIGNIFICANT CHANGE UP (ref 150–400)
POTASSIUM SERPL-MCNC: 4.7 MMOL/L — SIGNIFICANT CHANGE UP (ref 3.5–5.3)
POTASSIUM SERPL-SCNC: 4.7 MMOL/L — SIGNIFICANT CHANGE UP (ref 3.5–5.3)
PROT SERPL-MCNC: 7.1 GM/DL — SIGNIFICANT CHANGE UP (ref 6–8.3)
PROT UR-MCNC: SIGNIFICANT CHANGE UP MG/DL
PROTHROM AB SERPL-ACNC: 12.2 SEC — SIGNIFICANT CHANGE UP (ref 10.5–13.4)
RBC # BLD: 3.87 M/UL — SIGNIFICANT CHANGE UP (ref 3.8–5.2)
RBC # BLD: 4.6 M/UL — SIGNIFICANT CHANGE UP (ref 3.8–5.2)
RBC # FLD: 14 % — SIGNIFICANT CHANGE UP (ref 10.3–14.5)
RBC # FLD: 14.2 % — SIGNIFICANT CHANGE UP (ref 10.3–14.5)
RBC CASTS # UR COMP ASSIST: SIGNIFICANT CHANGE UP /HPF (ref 0–4)
SODIUM SERPL-SCNC: 140 MMOL/L — SIGNIFICANT CHANGE UP (ref 135–145)
SP GR SPEC: 1.01 — SIGNIFICANT CHANGE UP (ref 1.01–1.02)
TROPONIN I, HIGH SENSITIVITY RESULT: 51.98 NG/L — SIGNIFICANT CHANGE UP
TROPONIN I, HIGH SENSITIVITY RESULT: 52.04 NG/L — SIGNIFICANT CHANGE UP
TSH SERPL-MCNC: 4.72 UU/ML — SIGNIFICANT CHANGE UP (ref 0.34–4.82)
UROBILINOGEN FLD QL: 4
WBC # BLD: 9.08 K/UL — SIGNIFICANT CHANGE UP (ref 3.8–10.5)
WBC # BLD: 9.51 K/UL — SIGNIFICANT CHANGE UP (ref 3.8–10.5)
WBC # FLD AUTO: 9.08 K/UL — SIGNIFICANT CHANGE UP (ref 3.8–10.5)
WBC # FLD AUTO: 9.51 K/UL — SIGNIFICANT CHANGE UP (ref 3.8–10.5)
WBC UR QL: SIGNIFICANT CHANGE UP /HPF (ref 0–5)

## 2023-07-08 PROCEDURE — 80048 BASIC METABOLIC PNL TOTAL CA: CPT

## 2023-07-08 PROCEDURE — 93017 CV STRESS TEST TRACING ONLY: CPT

## 2023-07-08 PROCEDURE — 99291 CRITICAL CARE FIRST HOUR: CPT

## 2023-07-08 PROCEDURE — 85027 COMPLETE CBC AUTOMATED: CPT

## 2023-07-08 PROCEDURE — 78452 HT MUSCLE IMAGE SPECT MULT: CPT

## 2023-07-08 PROCEDURE — A9500: CPT

## 2023-07-08 PROCEDURE — 71045 X-RAY EXAM CHEST 1 VIEW: CPT | Mod: 26

## 2023-07-08 PROCEDURE — 93005 ELECTROCARDIOGRAM TRACING: CPT

## 2023-07-08 PROCEDURE — 99223 1ST HOSP IP/OBS HIGH 75: CPT

## 2023-07-08 PROCEDURE — 85730 THROMBOPLASTIN TIME PARTIAL: CPT

## 2023-07-08 PROCEDURE — 93010 ELECTROCARDIOGRAM REPORT: CPT

## 2023-07-08 PROCEDURE — 36415 COLL VENOUS BLD VENIPUNCTURE: CPT

## 2023-07-08 PROCEDURE — 84484 ASSAY OF TROPONIN QUANT: CPT

## 2023-07-08 PROCEDURE — 83880 ASSAY OF NATRIURETIC PEPTIDE: CPT

## 2023-07-08 RX ORDER — DILTIAZEM HCL 120 MG
10 CAPSULE, EXT RELEASE 24 HR ORAL ONCE
Refills: 0 | Status: DISCONTINUED | OUTPATIENT
Start: 2023-07-08 | End: 2023-07-08

## 2023-07-08 RX ORDER — HEPARIN SODIUM 5000 [USP'U]/ML
6500 INJECTION INTRAVENOUS; SUBCUTANEOUS EVERY 6 HOURS
Refills: 0 | Status: DISCONTINUED | OUTPATIENT
Start: 2023-07-08 | End: 2023-07-09

## 2023-07-08 RX ORDER — METOPROLOL TARTRATE 50 MG
1 TABLET ORAL
Qty: 0 | Refills: 0 | DISCHARGE

## 2023-07-08 RX ORDER — DILTIAZEM HCL 120 MG
5 CAPSULE, EXT RELEASE 24 HR ORAL
Qty: 125 | Refills: 0 | Status: DISCONTINUED | OUTPATIENT
Start: 2023-07-08 | End: 2023-07-08

## 2023-07-08 RX ORDER — HEPARIN SODIUM 5000 [USP'U]/ML
3500 INJECTION INTRAVENOUS; SUBCUTANEOUS EVERY 6 HOURS
Refills: 0 | Status: DISCONTINUED | OUTPATIENT
Start: 2023-07-08 | End: 2023-07-08

## 2023-07-08 RX ORDER — ASPIRIN/CALCIUM CARB/MAGNESIUM 324 MG
81 TABLET ORAL DAILY
Refills: 0 | Status: DISCONTINUED | OUTPATIENT
Start: 2023-07-08 | End: 2023-07-10

## 2023-07-08 RX ORDER — HEPARIN SODIUM 5000 [USP'U]/ML
7000 INJECTION INTRAVENOUS; SUBCUTANEOUS EVERY 6 HOURS
Refills: 0 | Status: DISCONTINUED | OUTPATIENT
Start: 2023-07-08 | End: 2023-07-08

## 2023-07-08 RX ORDER — METOPROLOL TARTRATE 50 MG
50 TABLET ORAL DAILY
Refills: 0 | Status: DISCONTINUED | OUTPATIENT
Start: 2023-07-08 | End: 2023-07-10

## 2023-07-08 RX ORDER — HEPARIN SODIUM 5000 [USP'U]/ML
INJECTION INTRAVENOUS; SUBCUTANEOUS
Qty: 25000 | Refills: 0 | Status: DISCONTINUED | OUTPATIENT
Start: 2023-07-08 | End: 2023-07-09

## 2023-07-08 RX ORDER — DILTIAZEM HCL 120 MG
10 CAPSULE, EXT RELEASE 24 HR ORAL ONCE
Refills: 0 | Status: COMPLETED | OUTPATIENT
Start: 2023-07-08 | End: 2023-07-08

## 2023-07-08 RX ORDER — ACETAMINOPHEN 500 MG
650 TABLET ORAL EVERY 6 HOURS
Refills: 0 | Status: DISCONTINUED | OUTPATIENT
Start: 2023-07-08 | End: 2023-07-10

## 2023-07-08 RX ORDER — ATORVASTATIN CALCIUM 80 MG/1
20 TABLET, FILM COATED ORAL AT BEDTIME
Refills: 0 | Status: DISCONTINUED | OUTPATIENT
Start: 2023-07-08 | End: 2023-07-10

## 2023-07-08 RX ORDER — HEPARIN SODIUM 5000 [USP'U]/ML
INJECTION INTRAVENOUS; SUBCUTANEOUS
Qty: 25000 | Refills: 0 | Status: DISCONTINUED | OUTPATIENT
Start: 2023-07-08 | End: 2023-07-08

## 2023-07-08 RX ORDER — DILTIAZEM HCL 120 MG
2.5 CAPSULE, EXT RELEASE 24 HR ORAL
Qty: 125 | Refills: 0 | Status: DISCONTINUED | OUTPATIENT
Start: 2023-07-08 | End: 2023-07-08

## 2023-07-08 RX ORDER — DILTIAZEM HCL 120 MG
2.5 CAPSULE, EXT RELEASE 24 HR ORAL
Qty: 125 | Refills: 0 | Status: DISCONTINUED | OUTPATIENT
Start: 2023-07-08 | End: 2023-07-09

## 2023-07-08 RX ORDER — HEPARIN SODIUM 5000 [USP'U]/ML
7000 INJECTION INTRAVENOUS; SUBCUTANEOUS ONCE
Refills: 0 | Status: DISCONTINUED | OUTPATIENT
Start: 2023-07-08 | End: 2023-07-08

## 2023-07-08 RX ORDER — SODIUM CHLORIDE 9 MG/ML
1000 INJECTION INTRAMUSCULAR; INTRAVENOUS; SUBCUTANEOUS ONCE
Refills: 0 | Status: COMPLETED | OUTPATIENT
Start: 2023-07-08 | End: 2023-07-08

## 2023-07-08 RX ORDER — HEPARIN SODIUM 5000 [USP'U]/ML
3000 INJECTION INTRAVENOUS; SUBCUTANEOUS EVERY 6 HOURS
Refills: 0 | Status: DISCONTINUED | OUTPATIENT
Start: 2023-07-08 | End: 2023-07-09

## 2023-07-08 RX ORDER — LANOLIN ALCOHOL/MO/W.PET/CERES
3 CREAM (GRAM) TOPICAL AT BEDTIME
Refills: 0 | Status: DISCONTINUED | OUTPATIENT
Start: 2023-07-08 | End: 2023-07-10

## 2023-07-08 RX ORDER — ONDANSETRON 8 MG/1
4 TABLET, FILM COATED ORAL EVERY 8 HOURS
Refills: 0 | Status: DISCONTINUED | OUTPATIENT
Start: 2023-07-08 | End: 2023-07-10

## 2023-07-08 RX ORDER — HEPARIN SODIUM 5000 [USP'U]/ML
6500 INJECTION INTRAVENOUS; SUBCUTANEOUS ONCE
Refills: 0 | Status: COMPLETED | OUTPATIENT
Start: 2023-07-08 | End: 2023-07-08

## 2023-07-08 RX ADMIN — Medication 50 MILLIGRAM(S): at 21:03

## 2023-07-08 RX ADMIN — SODIUM CHLORIDE 1000 MILLILITER(S): 9 INJECTION INTRAMUSCULAR; INTRAVENOUS; SUBCUTANEOUS at 15:01

## 2023-07-08 RX ADMIN — HEPARIN SODIUM 1500 UNIT(S)/HR: 5000 INJECTION INTRAVENOUS; SUBCUTANEOUS at 19:06

## 2023-07-08 RX ADMIN — HEPARIN SODIUM 0 UNIT(S)/HR: 5000 INJECTION INTRAVENOUS; SUBCUTANEOUS at 22:38

## 2023-07-08 RX ADMIN — Medication 81 MILLIGRAM(S): at 21:03

## 2023-07-08 RX ADMIN — Medication 10 MILLIGRAM(S): at 12:43

## 2023-07-08 RX ADMIN — Medication 5 MG/HR: at 15:00

## 2023-07-08 RX ADMIN — Medication 2.5 MG/HR: at 18:33

## 2023-07-08 RX ADMIN — ATORVASTATIN CALCIUM 20 MILLIGRAM(S): 80 TABLET, FILM COATED ORAL at 21:03

## 2023-07-08 RX ADMIN — HEPARIN SODIUM 6500 UNIT(S): 5000 INJECTION INTRAVENOUS; SUBCUTANEOUS at 15:17

## 2023-07-08 RX ADMIN — HEPARIN SODIUM 1500 UNIT(S)/HR: 5000 INJECTION INTRAVENOUS; SUBCUTANEOUS at 15:16

## 2023-07-08 RX ADMIN — HEPARIN SODIUM 1200 UNIT(S)/HR: 5000 INJECTION INTRAVENOUS; SUBCUTANEOUS at 23:44

## 2023-07-08 NOTE — H&P ADULT - ASSESSMENT
78 y/o female with PMHX of HTN, HLD, tobacco abuse (last 60 years-- 1/4 PPD) who presented to  with CC of  chest pain/ left arm pain/ palpitations.  In the Er, patient was found to be in new onset rapid AFIB.     #New onset AFIB with RVR:    Admit to tele.    S/p cardizem 10 IVP in ER.    Cont cardizem gtt @ 5.    Resume home metoprolol.    Heparin gtt for now for AC.  T/c transition to DOAC.    CHADSVASC = 4.    Hold other BP meds for now as BP low.  Norvasc/ Losartan.    ECHO.    Trend trop-- r/o ACS or ischemia.    No ETOH use.    Cardio eval-- Dr. Apple.      #Tob use:    Advised cessation.      #HTN:    Cont home BB.    Hold ARB / CCB for now with low BP/ afib.      #HLD:    Cont statin.      #DVT Proph:  heparin gtt.

## 2023-07-08 NOTE — PROVIDER CONTACT NOTE (CRITICAL VALUE NOTIFICATION) - ACTION/TREATMENT ORDERED:
Neuro: A&Ox4. Forgetfull  Cardiac: SR. VSS.   Respiratory: Sating 98% on RA.  GI/: Adequate urine output. No BM senna and suppository given. Pt received Miralax on day shift  Diet/appetite: Tolerating regular diet. Eating well.  Activity:  Assist of stand by up to bathroom  Pain: At acceptable level on current regimen. 10mg oxycodone q4hr last given at 8p  Skin: No new deficits noted.  LDA's: PIV and right chest dialysis catheter.    Plan: Continue with POC. Notify primary team with changes.    Plan for dialysis tomorrow morning at 7:40am   AMADEO Macias aware. Heparin gtt paused for one hour and restarted at 12.

## 2023-07-08 NOTE — ED PROVIDER NOTE - NS_ ATTENDINGSCRIBEDETAILS _ED_A_ED_FT
I, Angelo Coates MD,  performed the initial face to face bedside interview with this patient regarding history of present illness, review of symptoms and relevant past medical, social and family history.  I completed an independent physical examination.  I was the initial provider who evaluated this patient.   I personally saw the patient and performed a substantive portion of the visit including all aspects of the medical decision making.  The history, relevant review of systems, past medical and surgical history, medical decision making, and physical examination was documented by the scribe in my presence and I attest to the accuracy of the documentation.

## 2023-07-08 NOTE — ED PROVIDER NOTE - NS ED ROS FT
Constitutional: No fever or chills  Eyes: No visual changes  HEENT: No throat pain  CV: +chest pain  Resp: No SOB no cough  GI: No abd pain, nausea or vomiting  : No dysuria  MSK: +left arm pain and tingling  Skin: No rash  Neuro: No headache

## 2023-07-08 NOTE — H&P ADULT - NSHPREVIEWOFSYSTEMS_GEN_ALL_CORE
ROS:  General:  No fevers, chills, or unexplained weight loss  Skin: No rash or bothersome skin lesions  Musculoskeletal: No arthalgias, myalgias or joint swelling  Eyes: No visual changes or eye pain  Ears: No hearing loss , otorrhea or ear pain  Nose, Mouth, Throat: No nasal congestion, rhinorrhea, oral lesions, postnasal drip or sore throat  Cardio: No chest pain or palpitations. no lower extremity edema. no syncope. no claudication.   Respiratory: No cough, shortness of breath or wheezing   GI: No diarrhea, constipation, blood in stools, abdominal pain, vomiting or heartburn  : No urinary frequency, hematuria, incontinence, or dysuria  Neurologic: No headaches, parasthesias, confusion, dysarthria or gait instability  Psychiatric:  No anxiety or depression  Lymphatic:  No easy bruising, easy bleeding or swollen glands  Allergic: No itching, sneezing , watery eyes, clear rhinorrhea or recurrent infections ROS:  General:  No fevers, chills, or unexplained weight loss  Skin: No rash or bothersome skin lesions  Musculoskeletal: No arthalgias, myalgias or joint swelling  Eyes: No visual changes or eye pain  Ears: No hearing loss , otorrhea or ear pain  Nose, Mouth, Throat: No nasal congestion, rhinorrhea, oral lesions, postnasal drip or sore throat  Cardio: cp, palp, left arm pain.    Respiratory: No cough, shortness of breath or wheezing   GI: No diarrhea, constipation, blood in stools, abdominal pain, vomiting or heartburn  : No urinary frequency, hematuria, incontinence, or dysuria  Neurologic: No headaches, parasthesias, confusion, dysarthria or gait instability  Psychiatric:  anxiety  Lymphatic:  No easy bruising, easy bleeding or swollen glands  Allergic: No itching, sneezing , watery eyes, clear rhinorrhea or recurrent infections

## 2023-07-08 NOTE — ED PROVIDER NOTE - OBJECTIVE STATEMENT
76 yo female with PMHx of HTN presents to ED c/o cp, left arm and shoulder pain since last night. also c/o palpitations. pt is no longer experiencing these sx. +smoker, 5 cigs a day.     cardio - dr. baca

## 2023-07-08 NOTE — ED PROVIDER NOTE - CLINICAL SUMMARY MEDICAL DECISION MAKING FREE TEXT BOX
77 female presents to ed with cp found ot be in new onset afib. chads-vas score of 4. will recontrol, heparin and admit. 77 female presents to ed with cp found ot be in new onset afib. chads-vas score of 4. will recontrol, heparin and admit.    All labs imaging reviewed by myself.  Dimer negative troponin negative TSH negative hemoglobin normal patient started on Cardizem drip Case discussed with hospitalist accepts.  Cardiology consult placed.

## 2023-07-08 NOTE — ED ADULT NURSE NOTE - NSFALLRISKINTERV_ED_ALL_ED

## 2023-07-08 NOTE — H&P ADULT - NSHPLABSRESULTS_GEN_ALL_CORE
13.7   9.51  )-----------( 246      ( 2023 12:36 )             41.6     07-08    140  |  110<H>  |  18  ----------------------------<  122<H>  4.7   |  25  |  0.86    Ca    9.3      2023 12:36  Mg     2.3     07-08    TPro  7.1  /  Alb  3.5  /  TBili  0.5  /  DBili  x   /  AST  14<L>  /  ALT  23  /  AlkPhos  88  07-08    CAPILLARY BLOOD GLUCOSE        PT/INR - ( 2023 12:36 )   PT: 12.2 sec;   INR: 1.05 ratio         PTT - ( 2023 12:36 )  PTT:30.9 sec  Urinalysis Basic - ( 2023 13:51 )    Color: Yellow / Appearance: Slightly Turbid / S.010 / pH: x  Gluc: x / Ketone: Trace  / Bili: Small / Urobili: 4   Blood: x / Protein: Trace mg/dL / Nitrite: Negative   Leuk Esterase: Small / RBC: 0-2 /HPF / WBC 3-5 /HPF   Sq Epi: x / Non Sq Epi: x / Bacteria: Moderate 13.7   9.51  )-----------( 246      ( 2023 12:36 )             41.6     07-08    140  |  110<H>  |  18  ----------------------------<  122<H>  4.7   |  25  |  0.86    Ca    9.3      2023 12:36  Mg     2.3     07-08    TPro  7.1  /  Alb  3.5  /  TBili  0.5  /  DBili  x   /  AST  14<L>  /  ALT  23  /  AlkPhos  88  07-08    CAPILLARY BLOOD GLUCOSE        PT/INR - ( 2023 12:36 )   PT: 12.2 sec;   INR: 1.05 ratio         PTT - ( 2023 12:36 )  PTT:30.9 sec    Urinalysis Basic - ( 2023 13:51 )  Color: Yellow / Appearance: Slightly Turbid / S.010 / pH: x  Gluc: x / Ketone: Trace  / Bili: Small / Urobili: 4   Blood: x / Protein: Trace mg/dL / Nitrite: Negative   Leuk Esterase: Small / RBC: 0-2 /HPF / WBC 3-5 /HPF   Sq Epi: x / Non Sq Epi: x / Bacteria: Moderate

## 2023-07-08 NOTE — ED ADULT TRIAGE NOTE - CHIEF COMPLAINT QUOTE
pt presents to ED c/o chest pain, left arm pain and shoulder pain that occurred last night. pt also felt palpitations. all symptoms now subsided

## 2023-07-08 NOTE — H&P ADULT - NSHPPHYSICALEXAM_GEN_ALL_CORE
PEx  T(C): 36.8 (07-08-23 @ 13:12), Max: 36.8 (07-08-23 @ 13:12)  HR: 109 (07-08-23 @ 13:12) (93 - 109)  BP: 101/64 (07-08-23 @ 13:12) (101/64 - 139/110)  RR: 18 (07-08-23 @ 13:12) (18 - 18)  SpO2: 97% (07-08-23 @ 13:12) (97% - 97%)  Wt(kg): --  General:     Well appearing, well nourished in no distress, no identifying marks , scars, or tattoos.  Skin: no rash or prominent lesions  Head: normocephalic, atraumatic     Sinuses: non-tender  Nose: no external lesions, mucosa non-inflamed, septum and turbinates normal  Throat: no erythema, exudates or lesions.  Neck: Supple without lymphadenopathy. Thyroid no thyromegaly, no palpable thyroid nodules, no palpable nodules or masses, carotid arteries no bruits.   Breasts: No palpable masses or lesions.  Heart: RRR, no murmur or gallop.  Normal S1, S2.  No S3, S4.   Lungs: CTA bilaterally, no wheezes, rhonchi, rales.  Breathing unlabored.   Chest wall: Normal insp   Abdomen:  Soft, NT/ND, normal bowel sounds, no HSM, no masses.  No peritoneal signs.   Back: spine normal without deformity or tenderness.  Normal ROM   : Exam normal.  no inguinal hernias.  Extremities: No deformities, clubbing, cyanosis, or edema.  Musculoskeletal: Normal gait and station. No decreased range of motion, instability, atrophy or abnormal strength or tone in the head, neck, spine, ribs, pelvis or extremities.   Neurologic: CN 2-12 normal. Sensation to pain, touch and proprioception normal. DTRs normal in upper and lower extremities. No pathologic reflexes.  Motor normal.  Psychiatric: Oriented X3, intact recent and remote memory, judgement and insight, normal mood and affect. PEx  T(C): 36.8 (07-08-23 @ 13:12), Max: 36.8 (07-08-23 @ 13:12)  HR: 109 (07-08-23 @ 13:12) (93 - 109)  BP: 101/64 (07-08-23 @ 13:12) (101/64 - 139/110)  RR: 18 (07-08-23 @ 13:12) (18 - 18)  SpO2: 97% (07-08-23 @ 13:12) (97% - 97%)  Wt(kg): --  General:  NAD.  lying in bed.    Skin: no rash or prominent lesions  Head: normocephalic, atraumatic     Sinuses: non-tender  Nose: no external lesions, mucosa non-inflamed, septum and turbinates normal  Throat: no erythema, exudates or lesions.  Neck: Supple without lymphadenopathy. Thyroid no thyromegaly, no palpable thyroid nodules, no palpable nodules or masses, carotid arteries no bruits.   Breasts: No palpable masses or lesions.  Heart: irregularly irregular.  No murmur.    Lungs: few scattered rhonchi   Chest wall: Normal insp   Abdomen:  Soft, NT/ND, normal bowel sounds, no HSM, no masses.  No peritoneal signs.   Back: spine normal without deformity or tenderness.  Normal ROM   : Exam normal.  no inguinal hernias.  Extremities: No deformities, clubbing, cyanosis, or edema.  Musculoskeletal: Normal gait and station. No decreased range of motion, instability, atrophy or abnormal strength or tone in the head, neck, spine, ribs, pelvis or extremities.   Neurologic: CN 2-12 normal. Sensation to pain, touch and proprioception normal. DTRs normal in upper and lower extremities. No pathologic reflexes.  Motor normal.  Psychiatric: Oriented X3, intact recent and remote memory, judgement and insight, normal mood and affect.

## 2023-07-08 NOTE — ED PROVIDER NOTE - PHYSICAL EXAMINATION
Constitutional: NAD AAOx3  Eyes: PERRLA EOMI  Head: Normocephalic atraumatic  Mouth: MMM  Cardiac: rapid afib.   Resp: unlabored breathing  GI: Abd s/nt/nd  Neuro: grossly normal and intact  Skin: No visible rashes Constitutional: NAD AAOx3  Eyes: PERRLA EOMI  Head: Normocephalic atraumatic  Mouth: MMM  Cardiac: rapid afib.   Resp: unlabored breathing clear b/l   GI: Abd s/nt/nd  Neuro: grossly normal and intact  Skin: No visible rashes

## 2023-07-08 NOTE — PATIENT PROFILE ADULT - FALL HARM RISK - HARM RISK INTERVENTIONS

## 2023-07-08 NOTE — PROVIDER CONTACT NOTE (OTHER) - BACKGROUND
Patient admitted for cp and palpitations. Was found to be in new onset afib RVR. Started on cardizem gtt and heparin gtt.

## 2023-07-08 NOTE — ED ADULT NURSE NOTE - NS ED NURSE LEVEL OF CONSCIOUSNESS AFFECT
Subjective   Patient ID: Cristine is a 38 year old female.    Chief Complaint   Patient presents with   • Sore Throat     HPI   This is a new problem. c/o st  For past 4 days. Pertinent positives include: cough, runny/stuffy nose, bilateral ear pain and clogged for 6 days. Left sided throat pain>right.  Associated symptoms include: bilateral eye redness and itching.   States overall discomfort is rated 3/10 and is sharp. swallowing tends to make symptoms worse and tylenol tends to make symptoms better.  Has tried tylenol and halls otc for relief of symptoms.  positive exposure to sick contacts.  Denies recent travel.   Kids with strep and pink eye.      Past Medical History:   Diagnosis Date   • Essential (primary) hypertension    • Thyroid disease        MEDICATIONS:  Current Outpatient Medications   Medication Sig   • azithromycin (ZITHROMAX) 250 MG tablet Take 2 tablets po today, then 1 tablet po qd for 4 days   • polymyxin b-trimethoprim (POLYTRIM) 56276-8.1 UNIT/ML-% ophthalmic solution Place 1 drop into both eyes every 6 hours for 7 days.   • NIFEdipine CC (ADALAT CC) 60 MG 24 hr tablet TAKE 1 TABLET BY MOUTH DAILY   • levothyroxine (SYNTHROID, LEVOTHROID) 112 MCG tablet Take 1 tablet  mon-sat   • sertraline (ZOLOFT) 50 MG tablet TAKE 1 TABLET BY MOUTH DAILY     No current facility-administered medications for this visit.        ALLERGIES:  ALLERGIES:   Allergen Reactions   • Penicillins HIVES, SWELLING, DIARRHEA and ANAPHYLAXIS     Hypertension       PAST SURGICAL HISTORY:  Past Surgical History:   Procedure Laterality Date   • Appendectomy     •  section, low transverse         FAMILY HISTORY:  Family History   Problem Relation Age of Onset   • Hypertension Mother    • Diabetes Mother    • Hypertension Father    • COPD Father    • Hypertension Maternal Grandmother    • Hypertension Maternal Grandfather        SOCIAL HISTORY:  Social History     Tobacco Use   • Smoking status: Never Smoker   •  Smokeless tobacco: Never Used   Substance Use Topics   • Alcohol use: Not on file   • Drug use: Not on file         Patient's medications, allergies, past medical, surgical, and social history  were reviewed and updated as appropriate.    Review of Systems   HENT: Positive for congestion, ear pain, rhinorrhea, sinus pressure, sore throat and trouble swallowing.    Eyes: Positive for discharge and redness. Negative for photophobia, pain, itching and visual disturbance.   Respiratory: Positive for cough. Negative for choking, chest tightness, shortness of breath and wheezing.    Cardiovascular: Negative.    Gastrointestinal: Negative.    Musculoskeletal: Negative.    Skin: Negative.    Allergic/Immunologic: Negative.    Neurological: Positive for headaches.       Objective   Physical Exam  Vitals signs reviewed.   Constitutional:       Appearance: Normal appearance.   HENT:      Head: Normocephalic and atraumatic.      Right Ear: Hearing and external ear normal. Tympanic membrane is injected, erythematous and bulging. Tympanic membrane is not perforated.      Left Ear: Hearing and external ear normal. Tympanic membrane is injected, erythematous and bulging. Tympanic membrane is not perforated.      Nose: Mucosal edema present.      Right Turbinates: Enlarged.      Left Turbinates: Enlarged.      Right Sinus: No maxillary sinus tenderness or frontal sinus tenderness.      Left Sinus: No maxillary sinus tenderness or frontal sinus tenderness.      Mouth/Throat:      Lips: Pink.      Mouth: Mucous membranes are moist.      Pharynx: Pharyngeal swelling and posterior oropharyngeal erythema present. No oropharyngeal exudate or uvula swelling.      Tonsils: No tonsillar exudate or tonsillar abscesses. Swellin+ on the right. 1+ on the left.   Eyes:      General: Lids are normal.      Conjunctiva/sclera:      Right eye: Right conjunctiva is injected. No chemosis, exudate or hemorrhage.     Left eye: Left conjunctiva is  injected. No chemosis, exudate or hemorrhage.     Pupils: Pupils are equal, round, and reactive to light.   Neck:      Musculoskeletal: Normal range of motion.   Cardiovascular:      Rate and Rhythm: Normal rate.      Pulses: Normal pulses.      Heart sounds: Normal heart sounds.   Pulmonary:      Effort: Pulmonary effort is normal.      Breath sounds: Normal breath sounds.   Musculoskeletal: Normal range of motion.   Lymphadenopathy:      Head:      Right side of head: No submental, submandibular, tonsillar, preauricular, posterior auricular or occipital adenopathy.      Left side of head: No submental, submandibular, tonsillar, preauricular, posterior auricular or occipital adenopathy.      Cervical: Cervical adenopathy present.      Right cervical: No superficial, deep or posterior cervical adenopathy.     Left cervical: Superficial cervical adenopathy present. No deep or posterior cervical adenopathy.   Skin:     General: Skin is warm.   Neurological:      General: No focal deficit present.      Mental Status: She is alert and oriented to person, place, and time.   Psychiatric:         Mood and Affect: Mood normal.         Behavior: Behavior normal.         Thought Content: Thought content normal.         Judgement: Judgment normal.       Visit Vitals  /76 (BP Location: [facility], Patient Position: Sitting, Cuff Size: Large Adult)   Pulse 68   Temp 99 °F (37.2 °C) (Oral)   Resp 16   Ht 5' (1.524 m)   Wt 97.6 kg (215 lb 2.7 oz)   LMP 08/03/2019   SpO2 98%   BMI 42.02 kg/m²       Assessment   Problem List Items Addressed This Visit     None      Visit Diagnoses     Acute suppurative otitis media of both ears without spontaneous rupture of tympanic membranes, recurrence not specified    -  Primary    Relevant Medications    azithromycin (ZITHROMAX) 250 MG tablet    Sore throat        Relevant Orders    POCT RAPID STREP A (Completed)    Acute bacterial conjunctivitis of both eyes        Relevant Medications     polymyxin b-trimethoprim (POLYTRIM) 73846-9.1 UNIT/ML-% ophthalmic solution        Plan:  This is a 38 year old year-old female who presents with st, ear pain and cough.  You have a bacterial infection in the membranes covering the eye.  The most common symptoms include a thick discharge form the eye, swollen eyelids, redness, eyelids sticking together upon awakening, and a gritty or scratchy feeling in the eye.  The infection takes about 7-10 days to resolve with treatment.      use prescribed eye drops or ointment as directed to treat the infection  *apply a warm compress (cotton ball soaked in warm water) to the affected eye  for 10-15 minutes every 3-4 hours as needed for 5 days.   *Use a warm, wet cotton ball to wipe away crusting of the eyelids in the morning. You can use a mild baby soap with warm water to clean the crust in your eye. Avoid rubbing or touching the eye.  Cleanse secretions, wiping from inner corner of eye to outer aspect of eye. Do not reuse tissues after wiping  *If you use make up, discard all eye makeup and products.  *Wash pillow cases,linens and towels. Do not share items.  *Wash your hands before and after touching the infected eye. Wash hands frequently with an antibacterial soap.  *If you use contact lenses, discard contact lenses and case. Do not wear contact lenses until your eyes have healed and all symptoms are gone.  Bacterial conjunctivitis is contagious for 24-48 hours after starting medication  You are contagious for the next 24 hours   You may return to work / school 24 hours after antibiotic drops are started.  Avoid rubbing eyes to prevent spread of infection.  Wash hand frequently and before and after applying eye drops.  Do not share eye medications.  Do not share eye makeup  Throw away previously used contacts, case, and solution.  Avoid wearing contacts for the duration of antibiotic treatment.  If infection spreads to other eye, may use antibiotic drops with same  instructions for the full 7 days.  Wipe away drainage with fresh tissue every time and immediate discard used tissues.  May apply warm, moist compresses over eye to soften crusted eye drainage   Finish the antibiotic prescription to avoid resistant bacteria.    Medical compliance with plan discussed and risks of noncompliance reviewed    Patient verbalizes understanding of treatment plan  Thank you for visiting [facility].  Please follow up with your primary doctor as needed       Call the office if symptoms are not improving or worsening.  Follow up with ophthalmologist if symptoms are worsening or not improving in 24 hours.     Get Prompt Medical Attention if any of the following occur:  -worsening vision  -increasing pain in the eye  -increasing swelling or redness of the eyelid   -sensitivity to light  -Redness spreading around the eye      Written Hand out given.  Pt expressed understanding of the plan.  Medical compliance with Plan Discussed and risks of non-compliance Reviewed.  If symptoms continue or worsen after 24 hrs, seek immediate care.  Proper use of medication and side effects of medications reviewed and discussed.  Take medication as directed.  Patient education completed on disease process, etiology and prognosis.  Return to the clinic as clinically indicated as discussed with patient who verbalizes understanding of agreement and agreement with the pan.  Call the clinic with any questions or concerns [phone].       OTITIS MEDIA:  May use Ibuprofen 400-600 mg every 6-8 hrs as needed for pain.  Finish all of antibiotics as prescribed to prevent resistance.  Should notice improvement within 24 hours.  Follow up for any questions, concerns, or worsening of symptoms.   Medical compliance with plan discussed and risks of noncompliance reviewed    Patient verbalizes understanding of treatment plan    Thank you for visiting [facility].  Please follow up with your primary  doctor as needed       Instructions provided as documented in the AVS.    Thank you for visiting [facility].  Please follow up with your PCP if sx not improving.   Calm

## 2023-07-08 NOTE — H&P ADULT - HISTORY OF PRESENT ILLNESS
76 y/o female      PAST MEDICAL & SURGICAL HISTORY:  HTN (hypertension)      FAMILY HISTORY:  No pertinent family history in first degree relatives      Social History:      Allergies:  No Known Allergies   76 y/o female with PMHX of HTN, HLD, tobacco abuse (last 60 years-- 1/4 PPD) who presented to  with CC of  chest pain/ left arm pain/ palpitations.  Patient notes she started with sx of chest pain/ palp last evening.  She also noticed pain into her left arm.  She wasn't sure if it was anxiety and tried to fall asleep.  She slept on and off and this morning palpitations persisted and came to the ER for evaluation.  In the Er, patient was found to be in new onset rapid AFIB.  Patient given 10 IV cardizem and rate improved from 140's to 110's.  Patient denies hx of AFIB/ CAD/ CHF.  She denies any other associated sx-- no SOB, diaphoresis.  No nausea/ vomiting.  No ETOH use.  She does smoke ~5 cigs a day and has been since she was a teenager.  Has stopped intermittently in the past.        PAST MEDICAL & SURGICAL HISTORY:  HTN (hypertension)  HLD  Hip surgery      FAMILY HISTORY:  Father with heart disease-- denies afib hx.        Social History:    +TOB use.  5 cigs per day.  Ongoing for last 60 years.  since teenage years.    No ETOH use.      Allergies:  No Known Allergies

## 2023-07-08 NOTE — ED ADULT NURSE NOTE - OBJECTIVE STATEMENT
pt. presents form home with c/o palpitations and L arm discomfort starting yesterday. EKG revealed new onset of AfibRVR with -150s. PMH HTN. no abnormal stress tests in the past. Denies SOB/DEL ANGEL, Chest pain, nausea, vomiting, diarrhea, constipation, fevers/chills, urinary s/s.

## 2023-07-09 LAB
ANION GAP SERPL CALC-SCNC: 3 MMOL/L — LOW (ref 5–17)
APTT BLD: 106.2 SEC — HIGH (ref 27.5–35.5)
BUN SERPL-MCNC: 19 MG/DL — SIGNIFICANT CHANGE UP (ref 7–23)
CALCIUM SERPL-MCNC: 8.7 MG/DL — SIGNIFICANT CHANGE UP (ref 8.5–10.1)
CHLORIDE SERPL-SCNC: 114 MMOL/L — HIGH (ref 96–108)
CO2 SERPL-SCNC: 26 MMOL/L — SIGNIFICANT CHANGE UP (ref 22–31)
CREAT SERPL-MCNC: 0.63 MG/DL — SIGNIFICANT CHANGE UP (ref 0.5–1.3)
EGFR: 90 ML/MIN/1.73M2 — SIGNIFICANT CHANGE UP
GLUCOSE SERPL-MCNC: 131 MG/DL — HIGH (ref 70–99)
HCT VFR BLD CALC: 32.6 % — LOW (ref 34.5–45)
HGB BLD-MCNC: 10.7 G/DL — LOW (ref 11.5–15.5)
MCHC RBC-ENTMCNC: 29.6 PG — SIGNIFICANT CHANGE UP (ref 27–34)
MCHC RBC-ENTMCNC: 32.8 GM/DL — SIGNIFICANT CHANGE UP (ref 32–36)
MCV RBC AUTO: 90.3 FL — SIGNIFICANT CHANGE UP (ref 80–100)
PLATELET # BLD AUTO: 180 K/UL — SIGNIFICANT CHANGE UP (ref 150–400)
POTASSIUM SERPL-MCNC: 4 MMOL/L — SIGNIFICANT CHANGE UP (ref 3.5–5.3)
POTASSIUM SERPL-SCNC: 4 MMOL/L — SIGNIFICANT CHANGE UP (ref 3.5–5.3)
RBC # BLD: 3.61 M/UL — LOW (ref 3.8–5.2)
RBC # FLD: 14 % — SIGNIFICANT CHANGE UP (ref 10.3–14.5)
SODIUM SERPL-SCNC: 143 MMOL/L — SIGNIFICANT CHANGE UP (ref 135–145)
WBC # BLD: 7.82 K/UL — SIGNIFICANT CHANGE UP (ref 3.8–10.5)
WBC # FLD AUTO: 7.82 K/UL — SIGNIFICANT CHANGE UP (ref 3.8–10.5)

## 2023-07-09 PROCEDURE — 93010 ELECTROCARDIOGRAM REPORT: CPT

## 2023-07-09 PROCEDURE — 99233 SBSQ HOSP IP/OBS HIGH 50: CPT

## 2023-07-09 RX ORDER — APIXABAN 2.5 MG/1
5 TABLET, FILM COATED ORAL
Refills: 0 | Status: DISCONTINUED | OUTPATIENT
Start: 2023-07-09 | End: 2023-07-10

## 2023-07-09 RX ORDER — DILTIAZEM HCL 120 MG
60 CAPSULE, EXT RELEASE 24 HR ORAL EVERY 8 HOURS
Refills: 0 | Status: DISCONTINUED | OUTPATIENT
Start: 2023-07-09 | End: 2023-07-10

## 2023-07-09 RX ADMIN — APIXABAN 5 MILLIGRAM(S): 2.5 TABLET, FILM COATED ORAL at 21:11

## 2023-07-09 RX ADMIN — HEPARIN SODIUM 1000 UNIT(S)/HR: 5000 INJECTION INTRAVENOUS; SUBCUTANEOUS at 06:54

## 2023-07-09 RX ADMIN — Medication 60 MILLIGRAM(S): at 14:14

## 2023-07-09 RX ADMIN — Medication 60 MILLIGRAM(S): at 21:11

## 2023-07-09 RX ADMIN — ATORVASTATIN CALCIUM 20 MILLIGRAM(S): 80 TABLET, FILM COATED ORAL at 21:11

## 2023-07-09 RX ADMIN — HEPARIN SODIUM 1000 UNIT(S)/HR: 5000 INJECTION INTRAVENOUS; SUBCUTANEOUS at 07:10

## 2023-07-09 RX ADMIN — Medication 81 MILLIGRAM(S): at 09:59

## 2023-07-09 RX ADMIN — Medication 50 MILLIGRAM(S): at 09:59

## 2023-07-09 NOTE — PROGRESS NOTE ADULT - SUBJECTIVE AND OBJECTIVE BOX
Reason for Admission: chest pain/ palpitations/ arm pain    Patient is a 76 y/o female with PMHX of HTN, HLD, tobacco abuse (last 60 years-- 1/4 PPD) who presented to  with CC of  chest pain/ left arm pain/ palpitations.  Patient notes she started with sx of chest pain/ palp last evening.  She also noticed pain into her left arm.  She wasn't sure if it was anxiety and tried to fall asleep.  She slept on and off and this morning palpitations persisted and came to the ER for evaluation.  In the Er, patient was found to be in new onset rapid AFIB.  Patient given 10 IV cardizem and rate improved from 140's to 110's.  Patient denies hx of AFIB/ CAD/ CHF.  She denies any other associated sx-- no SOB, diaphoresis.  No nausea/ vomiting.  No ETOH use.  She does smoke ~5 cigs a day and has been since she was a teenager.  Has stopped intermittently in the past.      Medical progress: Denies any HA, CP, SOB. No fevers, chills or shakes. Labs and vitals reviewed. Comfortable.   Complaints: no new complaints  State of mind: normal    REVIEW OF SYSTEMS:  General: NAD, hemodynamically stable, (-)  fever, (-) chills, (-) weakness  HEENT:  Eyes:  No visual loss, blurred vision, double vision or yellow sclerae. Ears, Nose, Throat:  No hearing loss, sneezing, congestion, runny nose or sore throat.  SKIN:  No rash or itching.  CARDIOVASCULAR:  No chest pain, chest pressure or chest discomfort. No palpitations or edema.  RESPIRATORY:  No shortness of breath, cough or sputum.  GASTROINTESTINAL:  No anorexia, nausea, vomiting or diarrhea. No abdominal pain or blood.  NEUROLOGICAL:  No headache, dizziness, syncope, paralysis, ataxia, numbness or tingling in the extremities. No change in bowel or bladder control.  MUSCULOSKELETAL:  No muscle, back pain, joint pain or stiffness.  HEMATOLOGIC:  No anemia, bleeding or bruising.  LYMPHATICS:  No enlarged nodes. No history of splenectomy.  ENDOCRINOLOGIC:  No reports of sweating, cold or heat intolerance. No polyuria or polydipsia.  ALLERGIES:  No history of asthma, hives, eczema or rhinitis.    Physical Exam:   GENERAL APPEARANCE:  NAD, hemodynamically stable  T(C): 36.6 (07-09-23 @ 08:45), Max: 36.8 (07-08-23 @ 13:12)  HR: 65 (07-09-23 @ 08:45) (63 - 116)  BP: 137/54 (07-09-23 @ 08:45) (94/58 - 137/54)  RR: 18 (07-09-23 @ 08:45) (18 - 21)  SpO2: 96% (07-09-23 @ 08:45) (95% - 98%)  Wt(kg): --  HEENT:  Head is normocephalic    Skin:  Warm and dry without any rash   NECK:  Supple without lymphadenopathy.   HEART:  Regular rate and rhythm. normal S1 and S2, No M/R/G  LUNGS:  Good ins/exp effort, no W/R/R/C  ABDOMEN:  Soft, nontender, nondistended with good bowel sounds heard  EXTREMITIES:  Without cyanosis, clubbing or edema.   NEUROLOGICAL:  Gross nonfocal       CBC Full  -  ( 09 Jul 2023 05:53 )  WBC Count : 7.82 K/uL  RBC Count : 3.61 M/uL  Hemoglobin : 10.7 g/dL  Hematocrit : 32.6 %  Platelet Count - Automated : 180 K/uL  Mean Cell Volume : 90.3 fl  Mean Cell Hemoglobin : 29.6 pg  Mean Cell Hemoglobin Concentration : 32.8 gm/dL  Auto Neutrophil # : x  Auto Lymphocyte # : x  Auto Monocyte # : x  Auto Eosinophil # : x  Auto Basophil # : x  Auto Neutrophil % : x  Auto Lymphocyte % : x  Auto Monocyte % : x  Auto Eosinophil % : x  Auto Basophil % : x    PT/INR - ( 08 Jul 2023 12:36 )   PT: 12.2 sec;   INR: 1.05 ratio         PTT - ( 09 Jul 2023 05:53 )  PTT:106.2 sec  Urinalysis Basic - ( 09 Jul 2023 05:53 )    Color: x / Appearance: x / SG: x / pH: x  Gluc: 131 mg/dL / Ketone: x  / Bili: x / Urobili: x   Blood: x / Protein: x / Nitrite: x   Leuk Esterase: x / RBC: x / WBC x   Sq Epi: x / Non Sq Epi: x / Bacteria: x      07-09    143  |  114<H>  |  19  ----------------------------<  131<H>  4.0   |  26  |  0.63    Ca    8.7      09 Jul 2023 05:53  Mg     2.3     07-08    TPro  7.1  /  Alb  3.5  /  TBili  0.5  /  DBili  x   /  AST  14<L>  /  ALT  23  /  AlkPhos  88  07-08    76 y/o female with PMHX of HTN, HLD, tobacco abuse (last 60 years-- 1/4 PPD) who presented to  with CC of  chest pain/ left arm pain/ palpitations.  In the Er, patient was found to be in new onset rapid AFIB.     #New onset AFIB with RVR:  CHADSVASC = 4.    - good rate control in the 60s  - TELE MONITORING  - change IV Cardizem to PO 60 mg po q8h  - Resume home metoprolol.    - started on eliquis  - Hold other BP meds for now as BP low.  Norvasc/ Losartan.    - ECHO.    - care discussed with Dr. WHITE - recommends nuclear stress  - occult blood in the setting of decreasing HH  - No ETOH use.      #Tob use:    Advised cessation.      #HTN:    Cont home BB.    Hold ARB / CCB for now with low BP/ afib.      #HLD:    Cont statin.      #DVT Proph: DOAC

## 2023-07-09 NOTE — CONSULT NOTE ADULT - SUBJECTIVE AND OBJECTIVE BOX
Patient is a 77y old  Female who presents with a chief complaint of chest pain/ palpitations/ arm pain (08 Jul 2023 14:50)    7/9/2023- Cardiology Consultation: 77 year old woman with hypertension and tobacco use was well until about 10 PM on 7/7 when at rest she felt a rapid palpitation associated with tightness in the posterior  neck and both shoulders. She remained home until 7/8 then entered the ED. Found to have rapid atrial fibrillation with . Was treated with IV diltiazem and IV heparin. At about 9:30 PM on telemetry she converted to RSR. She is currently asymptomatic. No prior history of AF, CAD, DM, HLD., CVA, TIA, CHF. She does not exercise. No alcohol use. Life long smoker has cut down to 5 cigarettes per day. No FH of heart disease.     HPI:  76 y/o female with PMHX of HTN, HLD, tobacco abuse (last 60 years-- 1/4 PPD) who presented to  with CC of  chest pain/ left arm pain/ palpitations.  Patient notes she started with sx of chest pain/ palp last evening.  She also noticed pain into her left arm.  She wasn't sure if it was anxiety and tried to fall asleep.  She slept on and off and this morning palpitations persisted and came to the ER for evaluation.  In the Er, patient was found to be in new onset rapid AFIB.  Patient given 10 IV cardizem and rate improved from 140's to 110's.  Patient denies hx of AFIB/ CAD/ CHF.  She denies any other associated sx-- no SOB, diaphoresis.  No nausea/ vomiting.  No ETOH use.  She does smoke ~5 cigs a day and has been since she was a teenager.  Has stopped intermittently in the past.        PAST MEDICAL & SURGICAL HISTORY:  HTN (hypertension)  HLD  Hip surgery      FAMILY HISTORY:  Father with heart disease-- denies afib hx.        Social History:    +TOB use.  5 cigs per day.  Ongoing for last 60 years.  since teenage years.    No ETOH use.      Allergies:  No Known Allergies   (08 Jul 2023 14:50)      PAST MEDICAL & SURGICAL HISTORY:  HTN (hypertension)      No significant past surgical history            MEDICATIONS  (STANDING):  aspirin enteric coated 81 milliGRAM(s) Oral daily  atorvastatin 20 milliGRAM(s) Oral at bedtime  diltiazem Infusion 2.5 mG/Hr (2.5 mL/Hr) IV Continuous <Continuous>  heparin  Infusion.  Unit(s)/Hr (15 mL/Hr) IV Continuous <Continuous>  metoprolol succinate ER 50 milliGRAM(s) Oral daily    MEDICATIONS  (PRN):  acetaminophen     Tablet .. 650 milliGRAM(s) Oral every 6 hours PRN Temp greater or equal to 38C (100.4F), Mild Pain (1 - 3)  aluminum hydroxide/magnesium hydroxide/simethicone Suspension 30 milliLiter(s) Oral every 4 hours PRN Dyspepsia  heparin   Injectable 6500 Unit(s) IV Push every 6 hours PRN For aPTT less than 40  heparin   Injectable 3000 Unit(s) IV Push every 6 hours PRN For aPTT between 40 - 57  melatonin 3 milliGRAM(s) Oral at bedtime PRN Insomnia  ondansetron Injectable 4 milliGRAM(s) IV Push every 8 hours PRN Nausea and/or Vomiting      FAMILY HISTORY:  No pertinent family history in first degree relatives        SOCIAL HISTORY:  Tobacco-           Alcohol-              Illicit drugs-              Occupation-              Marital  status-  REVIEW OF SYSTEM:  Pertinent items are noted in HPI.    Vital Signs Last 24 Hrs  T(C): 36.6 (09 Jul 2023 08:45), Max: 36.8 (08 Jul 2023 13:12)  T(F): 97.9 (09 Jul 2023 08:45), Max: 98.2 (08 Jul 2023 13:12)  HR: 65 (09 Jul 2023 08:45) (63 - 116)  BP: 137/54 (09 Jul 2023 08:45) (94/58 - 139/110)  BP(mean): 65 (09 Jul 2023 05:23) (65 - 120)  RR: 18 (09 Jul 2023 08:45) (18 - 21)  SpO2: 96% (09 Jul 2023 08:45) (95% - 98%)    Parameters below as of 09 Jul 2023 08:45  Patient On (Oxygen Delivery Method): room air        I&O's Summary    PHYSICAL EXAM  General Appearance:   HEENT: PERRL, conjunctiva clear, EOM's intact, non injected pharynx, no exudate, TM   normal  Neck: Supple, , no adenopathy, thyroid: not enlarged, no carotid bruit or JVD  Back: Symmetric, no  tenderness,no soft tissue tenderness  Lungs: Clear to auscultation bilaterally,no adventitious breath sounds, normal   expiratory phase  Heart: Regular rate and rhythm, S1, S2 normal, no murmur, rub or gallop  Abdomen: Soft, non-tender, bowel sounds active , no hepatosplenomegaly  Extremities: no cyanosis or edema, no joint swelling  Skin: Skin color, texture normal, no rashes   Neurologic: Alert and oriented X3 , cranial nerves intact, sensory and motor normal,        INTERPRETATION OF TELEMETRY:    ECG: #1- AF ,  BPM, Nonspecific T abnormality.    #2 RSR 69 BPM, nonspecific T abnormality.        LABS:                          10.7   7.82  )-----------( 180      ( 09 Jul 2023 05:53 )             32.6     07-09    143  |  114<H>  |  19  ----------------------------<  131<H>  4.0   |  26  |  0.63    Ca    8.7      09 Jul 2023 05:53  Mg     2.3     07-08    TPro  7.1  /  Alb  3.5  /  TBili  0.5  /  DBili  x   /  AST  14<L>  /  ALT  23  /  AlkPhos  88  07-08          Pro BNP  -- 07-08 @ 12:36  D Dimer  159 07-08 @ 12:36    PT/INR - ( 08 Jul 2023 12:36 )   PT: 12.2 sec;   INR: 1.05 ratio         PTT - ( 09 Jul 2023 05:53 )  PTT:106.2 sec  Urinalysis Basic - ( 09 Jul 2023 05:53 )    Color: x / Appearance: x / SG: x / pH: x  Gluc: 131 mg/dL / Ketone: x  / Bili: x / Urobili: x   Blood: x / Protein: x / Nitrite: x   Leuk Esterase: x / RBC: x / WBC x   Sq Epi: x / Non Sq Epi: x / Bacteria: x            RADIOLOGY & ADDITIONAL STUDIES:    IMPRESSION:  1. New onset rapid atrial fibrillation converted to RSR on IV diltiazem which has now been discontinued. CHADVASC = 4.  2.Neck and shoulder pain during rapid AF, rule out obstructive CAD. No evidence of acute MI.  3.Anemia, etiology to be determined. Brendan out GI bleed.  4. Hypertension, stable.  PLAN:  Switch IV heparin to Eliquis. Continue metoprolol, losartan.   Pharmacological nuclear stress test. TTE.  Dr. Apple to follow starting 7/10. Stool guiac advised.  Discussed with Dr Granados.

## 2023-07-10 ENCOUNTER — TRANSCRIPTION ENCOUNTER (OUTPATIENT)
Age: 78
End: 2023-07-10

## 2023-07-10 VITALS — DIASTOLIC BLOOD PRESSURE: 61 MMHG | HEART RATE: 60 BPM | SYSTOLIC BLOOD PRESSURE: 136 MMHG

## 2023-07-10 LAB
ADD ON TEST-SPECIMEN IN LAB: SIGNIFICANT CHANGE UP
HCT VFR BLD CALC: 36.1 % — SIGNIFICANT CHANGE UP (ref 34.5–45)
HGB BLD-MCNC: 11.7 G/DL — SIGNIFICANT CHANGE UP (ref 11.5–15.5)
MCHC RBC-ENTMCNC: 29.6 PG — SIGNIFICANT CHANGE UP (ref 27–34)
MCHC RBC-ENTMCNC: 32.4 GM/DL — SIGNIFICANT CHANGE UP (ref 32–36)
MCV RBC AUTO: 91.4 FL — SIGNIFICANT CHANGE UP (ref 80–100)
NT-PROBNP SERPL-SCNC: 512 PG/ML — HIGH (ref 0–450)
PLATELET # BLD AUTO: 185 K/UL — SIGNIFICANT CHANGE UP (ref 150–400)
RBC # BLD: 3.95 M/UL — SIGNIFICANT CHANGE UP (ref 3.8–5.2)
RBC # FLD: 13.8 % — SIGNIFICANT CHANGE UP (ref 10.3–14.5)
WBC # BLD: 7.21 K/UL — SIGNIFICANT CHANGE UP (ref 3.8–10.5)
WBC # FLD AUTO: 7.21 K/UL — SIGNIFICANT CHANGE UP (ref 3.8–10.5)

## 2023-07-10 PROCEDURE — 99239 HOSP IP/OBS DSCHRG MGMT >30: CPT

## 2023-07-10 PROCEDURE — 78452 HT MUSCLE IMAGE SPECT MULT: CPT | Mod: 26

## 2023-07-10 RX ORDER — LOSARTAN POTASSIUM 100 MG/1
1 TABLET, FILM COATED ORAL
Qty: 0 | Refills: 0 | DISCHARGE

## 2023-07-10 RX ORDER — DILTIAZEM HCL 120 MG
1 CAPSULE, EXT RELEASE 24 HR ORAL
Qty: 30 | Refills: 0
Start: 2023-07-10 | End: 2023-08-08

## 2023-07-10 RX ORDER — AMLODIPINE BESYLATE 2.5 MG/1
1 TABLET ORAL
Refills: 0 | DISCHARGE

## 2023-07-10 RX ORDER — METOPROLOL TARTRATE 50 MG
1 TABLET ORAL
Qty: 30 | Refills: 0
Start: 2023-07-10 | End: 2023-08-08

## 2023-07-10 RX ORDER — DILTIAZEM HCL 120 MG
120 CAPSULE, EXT RELEASE 24 HR ORAL DAILY
Refills: 0 | Status: DISCONTINUED | OUTPATIENT
Start: 2023-07-10 | End: 2023-07-10

## 2023-07-10 RX ORDER — METOPROLOL TARTRATE 50 MG
1 TABLET ORAL
Refills: 0 | DISCHARGE

## 2023-07-10 RX ORDER — APIXABAN 2.5 MG/1
1 TABLET, FILM COATED ORAL
Qty: 60 | Refills: 0
Start: 2023-07-10 | End: 2023-08-08

## 2023-07-10 RX ORDER — REGADENOSON 0.08 MG/ML
0.4 INJECTION, SOLUTION INTRAVENOUS ONCE
Refills: 0 | Status: COMPLETED | OUTPATIENT
Start: 2023-07-10 | End: 2023-07-10

## 2023-07-10 RX ORDER — ASPIRIN/CALCIUM CARB/MAGNESIUM 324 MG
1 TABLET ORAL
Qty: 0 | Refills: 0 | DISCHARGE

## 2023-07-10 RX ADMIN — Medication 120 MILLIGRAM(S): at 14:17

## 2023-07-10 RX ADMIN — Medication 81 MILLIGRAM(S): at 12:16

## 2023-07-10 RX ADMIN — Medication 60 MILLIGRAM(S): at 06:24

## 2023-07-10 RX ADMIN — REGADENOSON 0.4 MILLIGRAM(S): 0.08 INJECTION, SOLUTION INTRAVENOUS at 10:30

## 2023-07-10 RX ADMIN — APIXABAN 5 MILLIGRAM(S): 2.5 TABLET, FILM COATED ORAL at 12:16

## 2023-07-10 RX ADMIN — Medication 50 MILLIGRAM(S): at 12:16

## 2023-07-10 NOTE — CHART NOTE - NSCHARTNOTEFT_GEN_A_CORE
Spoke to nurse, pt sinus bradycardia with HR in the 30's overnight, pt has been asymptomatic. Presented with A fib with RVR, if persistent episodes may need to decrease Metoprolol dose.

## 2023-07-10 NOTE — DISCHARGE NOTE NURSING/CASE MANAGEMENT/SOCIAL WORK - NSDCPEFALRISK_GEN_ALL_CORE
For information on Fall & Injury Prevention, visit: [email or web address] OR  [email or web address] OR  https://www.cdc.gov/steadi/patient.html

## 2023-07-10 NOTE — PROGRESS NOTE ADULT - ASSESSMENT
76 y/o female with PMHX of HTN, HLD, tobacco abuse (last 60 years-- 1/4 PPD) who presented to  with CC of  chest pain/ left arm pain/ palpitations.  Patient notes she started with sx of chest pain/ palp last evening.  She also noticed pain into her left arm.  She wasn't sure if it was anxiety and tried to fall asleep.  She slept on and off and this morning palpitations persisted and came to the ER for evaluation.  In the Er, patient was found to be in new onset rapid AFIB.  Patient given 10 IV cardizem and rate improved from 140's to 110's.  Patient denies hx of AFIB/ CAD/ CHF.  She denies any other associated sx-- no SOB, diaphoresis.  No nausea/ vomiting.  No ETOH use.  She does smoke ~5 cigs a day and has been since she was a teenager.  Has stopped intermittently in the past.      1. New onset rapid atrial fibrillation converted to RSR on IV diltiazem which has now been discontinued. CHADVASC = 4.  2.Neck and shoulder pain during rapid AF, rule out obstructive CAD. No evidence of acute MI.  3.Anemia, etiology to be determined. Brendan out GI bleed.  4. Hypertension, stable.  PLAN:  Switch IV heparin to Eliquis. Continue metoprolol, losartan.   Pharmacological nuclear stress test. TTE.  Dr. Apple to follow starting 7/10. Stool guiac advised.  Discussed with Dr Granados.   76 y/o female with PMHX of HTN, HLD, tobacco abuse (last 60 years-- 1/4 PPD) who presented to  with CC of  chest pain/ left arm pain/ palpitations.  Patient notes she started with sx of chest pain/ palp last evening.  She also noticed pain into her left arm.  She wasn't sure if it was anxiety and tried to fall asleep.  She slept on and off and this morning palpitations persisted and came to the ER for evaluation.  In the Er, patient was found to be in new onset rapid AFIB.  Patient given 10 IV cardizem and rate improved from 140's to 110's.  Patient denies hx of AFIB/ CAD/ CHF.  She denies any other associated sx-- no SOB, diaphoresis.  No nausea/ vomiting.  No ETOH use.  She does smoke ~5 cigs a day and has been since she was a teenager.  Has stopped intermittently in the past.      1. New onset rapid atrial fibrillation converted to RSR on IV diltiazem which has now been discontinued. CHADVASC = 4.  2. Neck and shoulder pain during rapid AF, rule out obstructive CAD. No evidence of acute MI.  3. Anemia, etiology to be determined. Brendan out GI bleed.  4. Hypertension, stable.    PLAN:    s/p  IV heparin now on Eliquis for stroke ppx  Continue metoprolol 25mg daily and Cardizem 120mg daily (dose reduced due to rates 40-60s)  C/w losartan.   Pharmacological nuclear stress test normal, EF slightly reduced from prior  Will obtain outpatient echo and place outpatient monitor  Appointment made on 7/13 @2Pm for follow up

## 2023-07-10 NOTE — DISCHARGE NOTE PROVIDER - HOSPITAL COURSE
Patient is a 78 y/o female with PMHX of HTN, HLD, tobacco abuse (last 60 years-- 1/4 PPD) who presented to  with CC of  chest pain/ left arm pain/ palpitations.  Patient notes she started with sx of chest pain/ palp last evening.  She also noticed pain into her left arm.  She wasn't sure if it was anxiety and tried to fall asleep.  She slept on and off and this morning palpitations persisted and came to the ER for evaluation.  In the Er, patient was found to be in new onset rapid AFIB.  Patient given 10 IV cardizem and rate improved from 140's to 110's.  Patient denies hx of AFIB/ CAD/ CHF.  She denies any other associated sx-- no SOB, diaphoresis.  No nausea/ vomiting.  No ETOH use.  She does smoke ~5 cigs a day and has been since she was a teenager.  Has stopped intermittently in the past.      Medical progress: Denies any HA, CP, SOB. No fevers, chills or shakes. Labs and vitals reviewed. Comfortable. Good HR controls.   Complaints: no new complaints  State of mind: normal    Physical Exam:   GENERAL APPEARANCE:  NAD, hemodynamically stable  T(C): 36.8 (07-10-23 @ 08:38), Max: 36.8 (07-09-23 @ 15:48)  HR: 68 (07-10-23 @ 11:57) (52 - 68)  BP: 135/58 (07-10-23 @ 11:57) (115/45 - 135/58)  RR: 18 (07-10-23 @ 08:38) (18 - 18)  SpO2: 96% (07-10-23 @ 08:38) (95% - 97%)  Wt(kg): --  HEENT:  Head is normocephalic    Skin:  Warm and dry without any rash   NECK:  Supple without lymphadenopathy.   HEART:  Regular rate and rhythm. normal S1 and S2, No M/R/G  LUNGS:  Good ins/exp effort, no W/R/R/C  ABDOMEN:  Soft, nontender, nondistended with good bowel sounds heard  EXTREMITIES:  Without cyanosis, clubbing or edema.   NEUROLOGICAL:  Gross nonfocal   Patient is a 78 y/o female with PMHX of HTN, HLD, tobacco abuse (last 60 years-- 1/4 PPD) who presented to  with CC of  chest pain/ left arm pain/ palpitations.  Patient notes she started with sx of chest pain/ palp last evening.  She also noticed pain into her left arm.  She wasn't sure if it was anxiety and tried to fall asleep.  She slept on and off and this morning palpitations persisted and came to the ER for evaluation.  In the Er, patient was found to be in new onset rapid AFIB.  Patient given 10 IV cardizem and rate improved from 140's to 110's.  Patient denies hx of AFIB/ CAD/ CHF.  She denies any other associated sx-- no SOB, diaphoresis.  No nausea/ vomiting.  No ETOH use.  She does smoke ~5 cigs a day and has been since she was a teenager.  Has stopped intermittently in the past.      Medical progress: Denies any HA, CP, SOB. No fevers, chills or shakes. Labs and vitals reviewed. Comfortable. Good HR controls.   Complaints: no new complaints  State of mind: normal    Physical Exam:   GENERAL APPEARANCE:  NAD, hemodynamically stable  T(C): 36.8 (07-10-23 @ 08:38), Max: 36.8 (07-09-23 @ 15:48)  HR: 68 (07-10-23 @ 11:57) (52 - 68)  BP: 135/58 (07-10-23 @ 11:57) (115/45 - 135/58)  RR: 18 (07-10-23 @ 08:38) (18 - 18)  SpO2: 96% (07-10-23 @ 08:38) (95% - 97%)    HEENT:  Head is normocephalic    Skin:  Warm and dry without any rash   NECK:  Supple without lymphadenopathy.   HEART:  Regular rate and rhythm. normal S1 and S2, No M/R/G  LUNGS:  Good ins/exp effort, no W/R/R/C  ABDOMEN:  Soft, nontender, nondistended with good bowel sounds heard  EXTREMITIES:  Without cyanosis, clubbing or edema.   NEUROLOGICAL:  Gross nonfocal

## 2023-07-10 NOTE — DISCHARGE NOTE PROVIDER - NSDCCPCAREPLAN_GEN_ALL_CORE_FT
PRINCIPAL DISCHARGE DIAGNOSIS  Diagnosis: Rapid atrial fibrillation  Assessment and Plan of Treatment: WHAT IS ATRIAL FIBRILLATION? Atrial fibrillation (AFIB) is a cardiac arrhythmia caused by a disorder in the hearts electrical system. An arrhythmia is a problem with the speed or rhythm of the heartbeat. Atrial fibrillation is the most common type of arrhythmia.  THINGS TO DO: (1) Monitor your blood pressure and heart rate (2) Limit or avoid alcohol intake – alcohol can increase your risk of AFIB (3) Do not smoke – nicotine can damage your heart and make it difficult to manage your AFIB (4) Eat heart healthy foods like fruits, vegetables, and whole grains (5) Manage a healthy weight (5) Get regular physical activity  MONITOR THESE SIGNS AND SYMPTOMS: (1) Shortness of breath (2) Nausea or vomiting (3) Chest pain or pressure (4) Chest palpitations. If you experience any of these, DO alert your primary care provider, or return to the Emergency Department if you feel very sick.  - started on eliquis  - continue with metoprolol  - continue with cardizem   - close follow up with Dr. Apple for long term monitoring devise.

## 2023-07-10 NOTE — DISCHARGE NOTE PROVIDER - CARE PROVIDER_API CALL
Juan Apple  Cardiology  [address] [place] [address]  Phone: [phone]  Fax: [phone]  Follow Up Time:

## 2023-07-10 NOTE — PHARMACOTHERAPY INTERVENTION NOTE - COMMENTS
New medications reviewed w/ patient all questions answered 
Medication history complete. Medications and allergies reviewed with patient and confirmed with .

## 2023-07-10 NOTE — CHART NOTE - NSCHARTNOTEFT_GEN_A_CORE
Nuclear stress test performed.   Pt tolerated well and completed test.   No significant ST or T wave changes.  Nuclear imaging to follow.

## 2023-07-10 NOTE — DISCHARGE NOTE PROVIDER - NSDCMRMEDTOKEN_GEN_ALL_CORE_FT
atorvastatin 20 mg oral tablet: 1 tab(s) orally once a day (at bedtime)  cholecalciferol 25 mcg (1000 intl units) oral tablet: 1 tab(s) orally once a day  dilTIAZem 120 mg/24 hours oral capsule, extended release: 1 cap(s) orally once a day  Eliquis 5 mg oral tablet: 1 tab(s) orally 2 times a day  Omega-3 oral capsule: 1 cap(s) orally once a day  Toprol-XL 25 mg oral tablet, extended release: 1 tab(s) orally once a day

## 2023-07-10 NOTE — PROGRESS NOTE ADULT - SUBJECTIVE AND OBJECTIVE BOX
CHIEF COMPLAINT: Patient is a 77y old  Female who presents with a chief complaint of chest pain/ palpitations/ arm pain (09 Jul 2023 11:32)    FROM H&P: 76 y/o female with PMHX of HTN, HLD, tobacco abuse (last 60 years-- 1/4 PPD) who presented to  with CC of  chest pain/ left arm pain/ palpitations.  Patient notes she started with sx of chest pain/ palp last evening.  She also noticed pain into her left arm.  She wasn't sure if it was anxiety and tried to fall asleep.  She slept on and off and this morning palpitations persisted and came to the ER for evaluation.  In the Er, patient was found to be in new onset rapid AFIB.  Patient given 10 IV cardizem and rate improved from 140's to 110's.  Patient denies hx of AFIB/ CAD/ CHF.  She denies any other associated sx-- no SOB, diaphoresis.  No nausea/ vomiting.  No ETOH use.  She does smoke ~5 cigs a day and has been since she was a teenager.  Has stopped intermittently in the past.      7/9/2023- Cardiology Consultation: 77 year old woman with hypertension and tobacco use was well until about 10 PM on 7/7 when at rest she felt a rapid palpitation associated with tightness in the posterior  neck and both shoulders. She remained home until 7/8 then entered the ED. Found to have rapid atrial fibrillation with . Was treated with IV diltiazem and IV heparin. At about 9:30 PM on telemetry she converted to RSR. She is currently asymptomatic. No prior history of AF, CAD, DM, HLD., CVA, TIA, CHF. She does not exercise. No alcohol use. Life long smoker has cut down to 5 cigarettes per day. No FH of heart disease.   7/10.    PAST MEDICAL & SURGICAL HISTORY:  HTN (hypertension)  HLD  Hip surgery    FAMILY HISTORY:  Father with heart disease-- denies afib hx.      SOCIAL HISTORY:    +TOB use.  5 cigs per day.  Ongoing for last 60 years.  since teenage years.    No ETOH use.      MEDICATIONS:  apixaban 5 milliGRAM(s) Oral two times a day  aspirin enteric coated 81 milliGRAM(s) Oral daily  atorvastatin 20 milliGRAM(s) Oral at bedtime  diltiazem    Tablet 60 milliGRAM(s) Oral every 8 hours  metoprolol succinate ER 50 milliGRAM(s) Oral daily    MEDICATIONS  (PRN):  acetaminophen     Tablet .. 650 milliGRAM(s) Oral every 6 hours PRN Temp greater or equal to 38C (100.4F), Mild Pain (1 - 3)  aluminum hydroxide/magnesium hydroxide/simethicone Suspension 30 milliLiter(s) Oral every 4 hours PRN Dyspepsia  melatonin 3 milliGRAM(s) Oral at bedtime PRN Insomnia  ondansetron Injectable 4 milliGRAM(s) IV Push every 8 hours PRN Nausea and/or Vomiting    HOME MEDICATIONS:  amLODIPine 5 mg oral tablet: 1 tab(s) orally once a day (08 Jul 2023 16:19)  aspirin 81 mg oral tablet: 1 tab(s) orally once a day (08 Jul 2023 16:17)  cholecalciferol 25 mcg (1000 intl units) oral tablet: 1 tab(s) orally once a day (08 Jul 2023 16:19)  losartan 100 mg oral tablet: 1 tab(s) orally once a day (08 Jul 2023 16:17)  metoprolol succinate 50 mg oral tablet, extended release: 1 tab(s) orally once a day (08 Jul 2023 16:19)  Omega-3 oral capsule: 1 cap(s) orally once a day (08 Jul 2023 16:19)  Vitamin B Complex oral tablet: 1 tab(s) orally once a day (08 Jul 2023 16:19)    PHYSICAL EXAM:  T(C): 36.8 (10 Jul 2023 08:38), Max: 36.8 (09 Jul 2023 15:48)  T(F): 98.2 (10 Jul 2023 08:38), Max: 98.3 (09 Jul 2023 15:48)  HR: 61 (10 Jul 2023 08:38) (52 - 61)  BP: 128/62 (10 Jul 2023 08:38) (115/45 - 128/62)  BP(mean): 66 (10 Jul 2023 06:08) (66 - 66)  RR: 18 (10 Jul 2023 08:38) (18 - 18)  SpO2: 96% (10 Jul 2023 08:38) (95% - 97%)    Parameters below as of 10 Jul 2023 08:38  Patient On (Oxygen Delivery Method): room air    Constitutional: NAD, awake and alert  HEENT: PERR, EOMI, Normal Hearing, MMM  Neck: Soft and supple, No LAD, No JVD  Respiratory: Breath sounds are clear bilaterally, No wheezing, rales or rhonchi  Cardiovascular: S1 and S2, regular rate and rhythm, no Murmurs, gallops or rubs  Gastrointestinal: Bowel Sounds present, soft, nontender, nondistended, no guarding, no rebound  Extremities: No peripheral edema  Vascular: 2+ peripheral pulses  Neurological: A/O x 3, no focal deficits  Musculoskeletal: 5/5 strength b/l upper and lower extremities  Skin: No rashes    =======================================    INTERPRETATION OF TELEMETRY:  - TroponinI hsT: <-52.04, <-51.98  ECG:  #1- AF ,  BPM, Nonspecific T abnormality.    #2 RSR 69 BPM, nonspecific T abnormality.    ========================================    LABS:                        11.7   7.21  )-----------( 185      ( 10 Jul 2023 08:10 )             36.1     07-09    143  |  114<H>  |  19  ----------------------------<  131<H>  4.0   |  26  |  0.63    Ca    8.7      09 Jul 2023 05:53  Mg     2.3     07-08    TPro  7.1  /  Alb  3.5  /  TBili  0.5  /  DBili  x   /  AST  14<L>  /  ALT  23  /  AlkPhos  88  07-08    PT/INR - ( 08 Jul 2023 12:36 )   PT: 12.2 sec;   INR: 1.05 ratio       PTT - ( 09 Jul 2023 05:53 )  PTT:106.2 sec    TroponinI hsT: <-52.04, <-51.98    RADIOLOGY & ADDITIONAL STUDIES:    6/8/23: CXR: Clear CHIEF COMPLAINT: Patient is a 77y old  Female who presents with a chief complaint of chest pain/ palpitations/ arm pain (09 Jul 2023 11:32)    FROM H&P: 76 y/o female with PMHX of HTN, HLD, tobacco abuse (last 60 years-- 1/4 PPD) who presented to  with CC of  chest pain/ left arm pain/ palpitations.  Patient notes she started with sx of chest pain/ palp last evening.  She also noticed pain into her left arm.  She wasn't sure if it was anxiety and tried to fall asleep.  She slept on and off and this morning palpitations persisted and came to the ER for evaluation.  In the Er, patient was found to be in new onset rapid AFIB.  Patient given 10 IV cardizem and rate improved from 140's to 110's.  Patient denies hx of AFIB/ CAD/ CHF.  She denies any other associated sx-- no SOB, diaphoresis.  No nausea/ vomiting.  No ETOH use.  She does smoke ~5 cigs a day and has been since she was a teenager.  Has stopped intermittently in the past.      7/9/2023- Cardiology Consultation: 77 year old woman with hypertension and tobacco use was well until about 10 PM on 7/7 when at rest she felt a rapid palpitation associated with tightness in the posterior  neck and both shoulders. She remained home until 7/8 then entered the ED. Found to have rapid atrial fibrillation with . Was treated with IV diltiazem and IV heparin. At about 9:30 PM on telemetry she converted to RSR. She is currently asymptomatic. No prior history of AF, CAD, DM, HLD., CVA, TIA, CHF. She does not exercise. No alcohol use. Life long smoker has cut down to 5 cigarettes per day. No FH of heart disease.   7/10. Feeling better, breathing stable +RLL wheezing.   no cp or palps.    PAST MEDICAL & SURGICAL HISTORY:  HTN (hypertension)  HLD  Hip surgery    FAMILY HISTORY:  Father with heart disease-- denies afib hx.      SOCIAL HISTORY:    +TOB use.  5 cigs per day.  Ongoing for last 60 years.  since teenage years.    No ETOH use.      MEDICATIONS:  apixaban 5 milliGRAM(s) Oral two times a day  aspirin enteric coated 81 milliGRAM(s) Oral daily  atorvastatin 20 milliGRAM(s) Oral at bedtime  diltiazem    Tablet 60 milliGRAM(s) Oral every 8 hours  metoprolol succinate ER 50 milliGRAM(s) Oral daily    MEDICATIONS  (PRN):  acetaminophen     Tablet .. 650 milliGRAM(s) Oral every 6 hours PRN Temp greater or equal to 38C (100.4F), Mild Pain (1 - 3)  aluminum hydroxide/magnesium hydroxide/simethicone Suspension 30 milliLiter(s) Oral every 4 hours PRN Dyspepsia  melatonin 3 milliGRAM(s) Oral at bedtime PRN Insomnia  ondansetron Injectable 4 milliGRAM(s) IV Push every 8 hours PRN Nausea and/or Vomiting    HOME MEDICATIONS:  amLODIPine 5 mg oral tablet: 1 tab(s) orally once a day (08 Jul 2023 16:19)  aspirin 81 mg oral tablet: 1 tab(s) orally once a day (08 Jul 2023 16:17)  cholecalciferol 25 mcg (1000 intl units) oral tablet: 1 tab(s) orally once a day (08 Jul 2023 16:19)  losartan 100 mg oral tablet: 1 tab(s) orally once a day (08 Jul 2023 16:17)  metoprolol succinate 50 mg oral tablet, extended release: 1 tab(s) orally once a day (08 Jul 2023 16:19)  Omega-3 oral capsule: 1 cap(s) orally once a day (08 Jul 2023 16:19)  Vitamin B Complex oral tablet: 1 tab(s) orally once a day (08 Jul 2023 16:19)    PHYSICAL EXAM:  T(C): 36.8 (10 Jul 2023 08:38), Max: 36.8 (09 Jul 2023 15:48)  T(F): 98.2 (10 Jul 2023 08:38), Max: 98.3 (09 Jul 2023 15:48)  HR: 61 (10 Jul 2023 08:38) (52 - 61)  BP: 128/62 (10 Jul 2023 08:38) (115/45 - 128/62)  BP(mean): 66 (10 Jul 2023 06:08) (66 - 66)  RR: 18 (10 Jul 2023 08:38) (18 - 18)  SpO2: 96% (10 Jul 2023 08:38) (95% - 97%)    Parameters below as of 10 Jul 2023 08:38  Patient On (Oxygen Delivery Method): room air    Constitutional: Awake and alert  HEENT:  Normal Hearing, MMM  Neck: Soft and supple, No LAD, No JVD  Respiratory: Breath sounds are +wheezing RLL  Cardiovascular: S1 and S2,  RRR  Gastrointestinal: Bowel Sounds present, soft, nontender, nondistended, no guarding, no rebound  Extremities: No peripheral edema  Vascular: 2+ peripheral pulses  Neurological: A/O x 3, no focal deficits  Musculoskeletal: 5/5 strength b/l upper and lower extremities  Skin: No rashes    =======================================    INTERPRETATION OF TELEMETRY: SR 40-60s, noted rates 30s last night (meds adjusted)    ECG:  #1- AF ,  BPM, Nonspecific T abnormality.  #2 RSR 69 BPM, nonspecific T abnormality.    ========================================    LABS:                        11.7   7.21  )-----------( 185      ( 10 Jul 2023 08:10 )             36.1     07-09    143  |  114<H>  |  19  ----------------------------<  131<H>  4.0   |  26  |  0.63    Ca    8.7      09 Jul 2023 05:53  Mg     2.3     07-08    TPro  7.1  /  Alb  3.5  /  TBili  0.5  /  DBili  x   /  AST  14<L>  /  ALT  23  /  AlkPhos  88  07-08    PT/INR - ( 08 Jul 2023 12:36 )   PT: 12.2 sec;   INR: 1.05 ratio       PTT - ( 09 Jul 2023 05:53 )  PTT:106.2 sec    TroponinI hsT: <-52.04, <-51.98    RADIOLOGY & ADDITIONAL STUDIES:    6/8/23: CXR: Clear

## 2023-07-10 NOTE — DISCHARGE NOTE NURSING/CASE MANAGEMENT/SOCIAL WORK - PATIENT PORTAL LINK FT
You can access the FollowMyHealth Patient Portal offered by [facility] by registering at the following website: [email or web address]. By joining [employer]’s FollowMyHealth portal, you will also be able to view your health information using other applications (apps) compatible with our system.

## 2023-07-11 ENCOUNTER — NON-APPOINTMENT (OUTPATIENT)
Age: 78
End: 2023-07-11

## 2023-07-12 ENCOUNTER — NON-APPOINTMENT (OUTPATIENT)
Age: 78
End: 2023-07-12

## 2023-07-14 ENCOUNTER — APPOINTMENT (OUTPATIENT)
Dept: FAMILY MEDICINE | Facility: CLINIC | Age: 78
End: 2023-07-14
Payer: MEDICARE

## 2023-07-14 VITALS — DIASTOLIC BLOOD PRESSURE: 66 MMHG | SYSTOLIC BLOOD PRESSURE: 130 MMHG

## 2023-07-14 VITALS
WEIGHT: 189 LBS | OXYGEN SATURATION: 99 % | TEMPERATURE: 98.1 F | SYSTOLIC BLOOD PRESSURE: 142 MMHG | HEIGHT: 63 IN | HEART RATE: 68 BPM | BODY MASS INDEX: 33.49 KG/M2 | DIASTOLIC BLOOD PRESSURE: 68 MMHG

## 2023-07-14 DIAGNOSIS — R19.5 OTHER FECAL ABNORMALITIES: ICD-10-CM

## 2023-07-14 PROCEDURE — 99215 OFFICE O/P EST HI 40 MIN: CPT

## 2023-07-14 RX ORDER — CHLORHEXIDINE GLUCONATE 4 %
1000 LIQUID (ML) TOPICAL DAILY
Refills: 0 | Status: DISCONTINUED | COMMUNITY
End: 2023-07-14

## 2023-07-14 RX ORDER — DILTIAZEM HYDROCHLORIDE 120 MG/1
120 CAPSULE, EXTENDED RELEASE ORAL
Refills: 0 | Status: DISCONTINUED | COMMUNITY
End: 2023-07-14

## 2023-07-14 RX ORDER — APIXABAN 5 MG/1
5 TABLET, FILM COATED ORAL
Qty: 180 | Refills: 3 | Status: ACTIVE | COMMUNITY

## 2023-07-14 RX ORDER — LOSARTAN POTASSIUM 100 MG/1
100 TABLET, FILM COATED ORAL DAILY
Qty: 90 | Refills: 3 | Status: DISCONTINUED | COMMUNITY
Start: 2017-09-05 | End: 2023-07-14

## 2023-07-14 NOTE — PHYSICAL EXAM
[No Acute Distress] : no acute distress [Well Developed] : well developed [Well-Appearing] : well-appearing [Normal Sclera/Conjunctiva] : normal sclera/conjunctiva [EOMI] : extraocular movements intact [Normal Outer Ear/Nose] : the outer ears and nose were normal in appearance [No JVD] : no jugular venous distention [No Lymphadenopathy] : no lymphadenopathy [Supple] : supple [Thyroid Normal, No Nodules] : the thyroid was normal and there were no nodules present [No Respiratory Distress] : no respiratory distress  [No Accessory Muscle Use] : no accessory muscle use [Normal Rate] : normal rate  [Clear to Auscultation] : lungs were clear to auscultation bilaterally [Normal S1, S2] : normal S1 and S2 [No Murmur] : no murmur heard [Pedal Pulses Present] : the pedal pulses are present [No Carotid Bruits] : no carotid bruits [No Edema] : there was no peripheral edema [No Extremity Clubbing/Cyanosis] : no extremity clubbing/cyanosis [Soft] : abdomen soft [Non Tender] : non-tender [Non-distended] : non-distended [Normal Bowel Sounds] : normal bowel sounds [No Joint Swelling] : no joint swelling [Grossly Normal Strength/Tone] : grossly normal strength/tone [Coordination Grossly Intact] : coordination grossly intact [No Rash] : no rash [Normal Gait] : normal gait [Normal Affect] : the affect was normal [Normal Insight/Judgement] : insight and judgment were intact [Normal Posterior Cervical Nodes] : no posterior cervical lymphadenopathy [Normal Anterior Cervical Nodes] : no anterior cervical lymphadenopathy [de-identified] : mildly obese, but wgt stable from prior visit  [de-identified] : she is in NSR on exam today.  [de-identified] : lungs CTA B with regular breathing and with cough today , no w/c/r and fairly good AE

## 2023-07-14 NOTE — HEALTH RISK ASSESSMENT
[No falls in past year] : Patient reported no falls in the past year [0] : 2) Feeling down, depressed, or hopeless: Not at all (0) [PHQ-2 Negative - No further assessment needed] : PHQ-2 Negative - No further assessment needed [20 or more] : 20 or more [Current] : Current [[id number]] : 0

## 2023-07-14 NOTE — REVIEW OF SYSTEMS
[Recent Change In Weight] : ~T recent weight change [Cough] : cough [Joint Stiffness] : joint stiffness [Joint Pain] : joint pain [Negative] : Heme/Lymph [Fever] : no fever [Chills] : no chills [Fatigue] : no fatigue [Shortness Of Breath] : no shortness of breath [Wheezing] : no wheezing [Dyspnea on Exertion] : no dyspnea on exertion [Muscle Pain] : no muscle pain [FreeTextEntry2] : Purposeful wgt loss in past few mos with  eating/exercise  [FreeTextEntry5] : no palps/racing heart sensations since started treatment for Afib and converted back to SR in hosp [FreeTextEntry6] : intermittent cough on and off for years, due in part to allergies/post nasal drip also related to long term tobacco use, seeing Dr. Rubio for eval  [FreeTextEntry9] : stiff achy joints due to OA

## 2023-07-14 NOTE — PLAN
[FreeTextEntry1] : Continue all medications as prescribed. No labs needed today. \par \par She is due for colonoscopy to evaluate her positive Cologuard test and we again revd the importance of following through with this. She had initial consult with Dr. Marte 5/2023 and had scheduled colonoscopy in late 5/2023 but this was cancelled and not yet rescheduled. She updates me today that she is scheduled with a GI Dr. Seo at [place] for colonoscopy in 9/2023 . I advised her that she should update Dr. Seo re her new Dx/meds (eg Eliquis) and ask if will need to hold the Eliquis prior to colonoscopy (likely yes) and if so she will have to d/w card if/when/for how long she can hold Eliquis prior to colonoscopy, and whether it is ok to proceed with colonoscopy as sched 9/2023 vs push off a little further in the future, though it has already been delayed several times due to other circumstances so I would like her to try to complete this at some point not too far in the future  \par \par Revd/recommended Shingrix series. She had dose #1 and will get dose #2 in near future at pharmacy \par \par BP goal is <=135/85 on average on home checks. Advised to let us know if BPs are above goal on home checks. \par \par Lifestyle measures to optimize BP reviewed, including regular exercise, adequate sleep, Mediterranean or DASH style clean eating, mindfulness/meditation, magnesium 100-400 mg supplementation, avoid NSAIDS, stress management techniques etc. \par \par Hydrate very well (64+ oz water per day), limit/avoid cardiac irritants (eg caffeine, alcohol, oral decongestants etc), stress reduction measures, consider magnesium supplementation (100-400 mg daily) to decrease palpitations. \par \par LDL goal <=100 ideally. Reviewed risks/benefits of statin med. Recommended excellent hydration +/- co Q10 (100-400 mg daily) to decrease risk for statin related myalgias. \par \par Reviewed diabetes treatment plan including Mediterranean style/more plant based/clean eating, pair proteins with carbs for better glycemic control, regular exercise (strength training as well as cardio exercise, and try to do brisk walking after larger meals for better glycemic control), work to achieve/maintain a health weight, need for annual ophthalmology exam and good foot care (self care or with podiatrist if needed), Goal LDL <100, goal A1c <=7%, BP goal <=130-135/80-85 on average. Most patients with diabetes should be on an ACE-I or ARB med for renal protection, and a statin medication for primary CV risk reduction. \par \par Quit smoking again strongly urged and revd risks of contd smoking. She is not ready to quit yet but has reduced to 3 cigs per day and agrees to try to not increase, and says she is planning to quit by her bday in 10/2023. No safe level of smoking but reducing number of cigs per day as low as poss until ready to quit is good. \par \par Reviewed importance of good self care (eg meditation, yoga, adequate rest, regular exercise, magnesium, clean eating etc).\par \par Continue follow up with her specialists as recommended by them. \par \par Follow up as Scheduled for RPA visit (chol, HTN, DM etc) and repeat fasting labs in 12/2023\par \par Face-to-face time spent with patient, over half in discussion of the above diagnoses and treatment plan: 40 minutes.\par

## 2023-07-14 NOTE — ASSESSMENT
[FreeTextEntry1] : KEYLA KEMP is a 77 year old female here for follow up on medical issues as noted above.\par \par Keyla has h/o HTN, high cholesterol, diabetes, hypothyroidism, new onset A fib (7/2023), IFG and she is an active smoker. \par \par INitial Bp was elevated. Repeat BP at end of visit is at goal. She will continue new current med regimen of Diltiazem  mg daily  and Metoprolol succinate 25 mg daily. I asked her to start checking BPs at home and if BPs are above goal on home checks she should let me and Dr. Apple know as would either incr her metoprolol dose back to 50 mg or restart her Losartan \par \par Revd that she will need to inform her GI specialist (Dr. Seo at [place] she states) that she is now on Eliquis and she will need to coordinate between GI and card re if can hold her Eliquis/ASA for a few days (?5 d, ?7 d etc) prior to colonoscopy and if can still proceed with colonoscopy as sched in 9/2023 vs whether needs to push this off a little further into the future,

## 2023-07-14 NOTE — HISTORY OF PRESENT ILLNESS
[FreeTextEntry1] : KEYLA KEMP is a 77 year old female here for a follow up visit.\par  [de-identified] : \emily Pratt has a history of hypertension, high cholesterol, hypothyroidism, impaired fasting glucose now progressed to DM, and she is an active smoker with pulm nodule (stable on several Chest CT so far and being monitored/followed up by Dr. Rubio). She is also here for hospital f/u visit (see below) for chest pain/palps and new onset Atrial fibrillation--> converted back to NSR.\par \[facility] UTD on f/u with her specialists Dr. Apple (card), Dr. Rubio (pulm). She also updates me today that she is seeing Dr. Seo at [facility] for GI f/u \par \[facility] She saw Dr. Rubio 5/2023 for f/u visit and had repeat Chest CT 5/30/23 showing RLL 3 mm nodule unchanged from prior, as well as unchanged scarring lung apices. She had coronary artery calcium incidentally noted as Dr. Rubio recommended she f/u with Dr. Apple about this. He also rec she quit smoking. \[facility] \[facility] Also in 7/2022 her Cologuard CRC screen was positive and she was referred for Colonoscopy. She had initial consult with Dr. Marte 5/2023 and had scheduled colonoscopy in late 5/2023 but this was cancelled at she wants to see different GI and she rescheduled for near future at [place]  with Dr. Seo (I asked her to get info sent here as no info recd and we will request results also).  \par \par Santa was in HH 7/8/23 to 7/10/23 for chest pain/L arm and shoulder pain/palps. She was found to be in new onset Afib with RVR in ED. She was given IV Cardizem and rate improved from 140s to 110s. She converted back to NSR. She was started on Eliquis 5 mg BID, Cardizem po, and Metoprolol was continued but dose was decreased from 50 to 25 mg. Amlodipine was stopped as was losartan. She has f/u scheduled with Dr. Apple in near future and saw his team this week for first hosp f/u visit. CBC, CMP wnl at time of hosp d/c so no labs needed today.\par \par She is still smoking but states she is working to wean down cig use and has cut back to 3 cigs/day. Her plan is to quit smoking by her 78th bday in October 2023. \par \par She saw Dr. Apple's team and has extended Holter on right now and will be having Echo in 2 wks. She checked with Dr. Apple and was advised to restart ASA 81 mg daily and take as well as the Eliquis

## 2023-07-17 DIAGNOSIS — E78.5 HYPERLIPIDEMIA, UNSPECIFIED: ICD-10-CM

## 2023-07-17 DIAGNOSIS — I48.91 UNSPECIFIED ATRIAL FIBRILLATION: ICD-10-CM

## 2023-07-17 DIAGNOSIS — I10 ESSENTIAL (PRIMARY) HYPERTENSION: ICD-10-CM

## 2023-07-17 DIAGNOSIS — D64.9 ANEMIA, UNSPECIFIED: ICD-10-CM

## 2023-07-17 DIAGNOSIS — F17.210 NICOTINE DEPENDENCE, CIGARETTES, UNCOMPLICATED: ICD-10-CM

## 2023-07-17 DIAGNOSIS — Z79.82 LONG TERM (CURRENT) USE OF ASPIRIN: ICD-10-CM

## 2023-08-14 ENCOUNTER — APPOINTMENT (OUTPATIENT)
Dept: FAMILY MEDICINE | Facility: CLINIC | Age: 78
End: 2023-08-14
Payer: MEDICARE

## 2023-08-14 VITALS
TEMPERATURE: 98 F | WEIGHT: 189 LBS | DIASTOLIC BLOOD PRESSURE: 80 MMHG | BODY MASS INDEX: 33.49 KG/M2 | OXYGEN SATURATION: 96 % | HEIGHT: 63 IN | SYSTOLIC BLOOD PRESSURE: 148 MMHG | HEART RATE: 66 BPM

## 2023-08-14 DIAGNOSIS — R05.9 COUGH, UNSPECIFIED: ICD-10-CM

## 2023-08-14 DIAGNOSIS — J20.9 ACUTE BRONCHITIS, UNSPECIFIED: ICD-10-CM

## 2023-08-14 PROCEDURE — 99214 OFFICE O/P EST MOD 30 MIN: CPT

## 2023-08-14 RX ORDER — ZOSTER VACCINE RECOMBINANT, ADJUVANTED 50 MCG/0.5
50 KIT INTRAMUSCULAR
Qty: 1 | Refills: 1 | Status: DISCONTINUED | COMMUNITY
Start: 2022-03-28 | End: 2023-08-14

## 2023-08-14 NOTE — PHYSICAL EXAM
[Normal] : no jugular venous distention, supple, no lymphadenopathy and the thyroid was normal and there were no nodules present [de-identified] : Inspiratory and expiratory generalized wheezing throughout lungs

## 2023-08-14 NOTE — HEALTH RISK ASSESSMENT
[0] : 2) Feeling down, depressed, or hopeless: Not at all (0) [PHQ-2 Negative - No further assessment needed] : PHQ-2 Negative - No further assessment needed [Current] : Current [[id number]] : 0

## 2023-08-14 NOTE — HISTORY OF PRESENT ILLNESS
[FreeTextEntry8] : Acute care visit Patient reports 1 week of coughing and wheezing with increased phlegm production.  She is not improving.  He has not taken anything over-the-counter to help with symptoms.  Denies fever or chills.  Has increased fatigue and lethargy.  She does state that the chest tightness has improved She reports having chronic bronchitis.  She is an every day smoker.  She last smoked 3 days ago because she is having trouble exhaling.  She also had an episode of having trouble catching her breath.

## 2023-08-15 LAB — SARS-COV-2 N GENE NPH QL NAA+PROBE: NOT DETECTED

## 2023-08-17 ENCOUNTER — RX CHANGE (OUTPATIENT)
Age: 78
End: 2023-08-17

## 2023-08-17 RX ORDER — MOMETASONE FUROATE AND FORMOTEROL FUMARATE DIHYDRATE 100; 5 UG/1; UG/1
100-5 AEROSOL RESPIRATORY (INHALATION)
Qty: 1 | Refills: 0 | Status: DISCONTINUED | COMMUNITY
Start: 2023-08-17 | End: 2023-08-17

## 2023-08-17 RX ORDER — BUDESONIDE AND FORMOTEROL FUMARATE DIHYDRATE 80; 4.5 UG/1; UG/1
80-4.5 AEROSOL RESPIRATORY (INHALATION)
Qty: 1 | Refills: 0 | Status: DISCONTINUED | COMMUNITY
Start: 2023-08-14 | End: 2023-08-17

## 2023-08-21 ENCOUNTER — RX CHANGE (OUTPATIENT)
Age: 78
End: 2023-08-21

## 2023-09-06 ENCOUNTER — TRANSCRIPTION ENCOUNTER (OUTPATIENT)
Age: 78
End: 2023-09-06

## 2023-10-17 ENCOUNTER — INPATIENT (INPATIENT)
Facility: HOSPITAL | Age: 78
LOS: 2 days | Discharge: HOME | DRG: 308 | End: 2023-10-20
Attending: FAMILY MEDICINE | Admitting: INTERNAL MEDICINE
Payer: MEDICARE

## 2023-10-17 VITALS
DIASTOLIC BLOOD PRESSURE: 118 MMHG | HEIGHT: 64 IN | OXYGEN SATURATION: 98 % | WEIGHT: 190.04 LBS | SYSTOLIC BLOOD PRESSURE: 180 MMHG | RESPIRATION RATE: 19 BRPM | TEMPERATURE: 98 F | HEART RATE: 104 BPM

## 2023-10-17 LAB
ALBUMIN SERPL ELPH-MCNC: 3.4 G/DL — SIGNIFICANT CHANGE UP (ref 3.3–5)
ALBUMIN SERPL ELPH-MCNC: 3.4 G/DL — SIGNIFICANT CHANGE UP (ref 3.3–5)
ALP SERPL-CCNC: 110 U/L — SIGNIFICANT CHANGE UP (ref 40–120)
ALP SERPL-CCNC: 110 U/L — SIGNIFICANT CHANGE UP (ref 40–120)
ALT FLD-CCNC: 27 U/L — SIGNIFICANT CHANGE UP (ref 12–78)
ALT FLD-CCNC: 27 U/L — SIGNIFICANT CHANGE UP (ref 12–78)
ANION GAP SERPL CALC-SCNC: 5 MMOL/L — SIGNIFICANT CHANGE UP (ref 5–17)
ANION GAP SERPL CALC-SCNC: 5 MMOL/L — SIGNIFICANT CHANGE UP (ref 5–17)
APPEARANCE UR: CLEAR — SIGNIFICANT CHANGE UP
APPEARANCE UR: CLEAR — SIGNIFICANT CHANGE UP
APTT BLD: 34.8 SEC — SIGNIFICANT CHANGE UP (ref 24.5–35.6)
APTT BLD: 34.8 SEC — SIGNIFICANT CHANGE UP (ref 24.5–35.6)
AST SERPL-CCNC: 11 U/L — LOW (ref 15–37)
AST SERPL-CCNC: 11 U/L — LOW (ref 15–37)
BACTERIA # UR AUTO: NEGATIVE /HPF — SIGNIFICANT CHANGE UP
BACTERIA # UR AUTO: NEGATIVE /HPF — SIGNIFICANT CHANGE UP
BASOPHILS # BLD AUTO: 0.02 K/UL — SIGNIFICANT CHANGE UP (ref 0–0.2)
BASOPHILS # BLD AUTO: 0.02 K/UL — SIGNIFICANT CHANGE UP (ref 0–0.2)
BASOPHILS NFR BLD AUTO: 0.2 % — SIGNIFICANT CHANGE UP (ref 0–2)
BASOPHILS NFR BLD AUTO: 0.2 % — SIGNIFICANT CHANGE UP (ref 0–2)
BILIRUB SERPL-MCNC: 0.4 MG/DL — SIGNIFICANT CHANGE UP (ref 0.2–1.2)
BILIRUB SERPL-MCNC: 0.4 MG/DL — SIGNIFICANT CHANGE UP (ref 0.2–1.2)
BILIRUB UR-MCNC: NEGATIVE — SIGNIFICANT CHANGE UP
BILIRUB UR-MCNC: NEGATIVE — SIGNIFICANT CHANGE UP
BUN SERPL-MCNC: 19 MG/DL — SIGNIFICANT CHANGE UP (ref 7–23)
BUN SERPL-MCNC: 19 MG/DL — SIGNIFICANT CHANGE UP (ref 7–23)
CALCIUM SERPL-MCNC: 9.3 MG/DL — SIGNIFICANT CHANGE UP (ref 8.5–10.1)
CALCIUM SERPL-MCNC: 9.3 MG/DL — SIGNIFICANT CHANGE UP (ref 8.5–10.1)
CHLORIDE SERPL-SCNC: 111 MMOL/L — HIGH (ref 96–108)
CHLORIDE SERPL-SCNC: 111 MMOL/L — HIGH (ref 96–108)
CO2 SERPL-SCNC: 30 MMOL/L — SIGNIFICANT CHANGE UP (ref 22–31)
CO2 SERPL-SCNC: 30 MMOL/L — SIGNIFICANT CHANGE UP (ref 22–31)
COLOR SPEC: YELLOW — SIGNIFICANT CHANGE UP
COLOR SPEC: YELLOW — SIGNIFICANT CHANGE UP
CREAT SERPL-MCNC: 0.8 MG/DL — SIGNIFICANT CHANGE UP (ref 0.5–1.3)
CREAT SERPL-MCNC: 0.8 MG/DL — SIGNIFICANT CHANGE UP (ref 0.5–1.3)
DIFF PNL FLD: ABNORMAL
DIFF PNL FLD: ABNORMAL
EGFR: 74 ML/MIN/1.73M2 — SIGNIFICANT CHANGE UP
EGFR: 74 ML/MIN/1.73M2 — SIGNIFICANT CHANGE UP
EOSINOPHIL # BLD AUTO: 0.07 K/UL — SIGNIFICANT CHANGE UP (ref 0–0.5)
EOSINOPHIL # BLD AUTO: 0.07 K/UL — SIGNIFICANT CHANGE UP (ref 0–0.5)
EOSINOPHIL NFR BLD AUTO: 0.8 % — SIGNIFICANT CHANGE UP (ref 0–6)
EOSINOPHIL NFR BLD AUTO: 0.8 % — SIGNIFICANT CHANGE UP (ref 0–6)
FLUAV AG NPH QL: SIGNIFICANT CHANGE UP
FLUAV AG NPH QL: SIGNIFICANT CHANGE UP
FLUBV AG NPH QL: SIGNIFICANT CHANGE UP
FLUBV AG NPH QL: SIGNIFICANT CHANGE UP
GLUCOSE SERPL-MCNC: 149 MG/DL — HIGH (ref 70–99)
GLUCOSE SERPL-MCNC: 149 MG/DL — HIGH (ref 70–99)
GLUCOSE UR QL: 100 MG/DL
GLUCOSE UR QL: 100 MG/DL
HCT VFR BLD CALC: 38.2 % — SIGNIFICANT CHANGE UP (ref 34.5–45)
HCT VFR BLD CALC: 38.2 % — SIGNIFICANT CHANGE UP (ref 34.5–45)
HGB BLD-MCNC: 12.6 G/DL — SIGNIFICANT CHANGE UP (ref 11.5–15.5)
HGB BLD-MCNC: 12.6 G/DL — SIGNIFICANT CHANGE UP (ref 11.5–15.5)
IMM GRANULOCYTES NFR BLD AUTO: 0.5 % — SIGNIFICANT CHANGE UP (ref 0–0.9)
IMM GRANULOCYTES NFR BLD AUTO: 0.5 % — SIGNIFICANT CHANGE UP (ref 0–0.9)
INR BLD: 1.32 RATIO — HIGH (ref 0.85–1.18)
INR BLD: 1.32 RATIO — HIGH (ref 0.85–1.18)
KETONES UR-MCNC: NEGATIVE MG/DL — SIGNIFICANT CHANGE UP
KETONES UR-MCNC: NEGATIVE MG/DL — SIGNIFICANT CHANGE UP
LEUKOCYTE ESTERASE UR-ACNC: NEGATIVE — SIGNIFICANT CHANGE UP
LEUKOCYTE ESTERASE UR-ACNC: NEGATIVE — SIGNIFICANT CHANGE UP
LIDOCAIN IGE QN: 33 U/L — SIGNIFICANT CHANGE UP (ref 13–75)
LIDOCAIN IGE QN: 33 U/L — SIGNIFICANT CHANGE UP (ref 13–75)
LYMPHOCYTES # BLD AUTO: 2.73 K/UL — SIGNIFICANT CHANGE UP (ref 1–3.3)
LYMPHOCYTES # BLD AUTO: 2.73 K/UL — SIGNIFICANT CHANGE UP (ref 1–3.3)
LYMPHOCYTES # BLD AUTO: 32.5 % — SIGNIFICANT CHANGE UP (ref 13–44)
LYMPHOCYTES # BLD AUTO: 32.5 % — SIGNIFICANT CHANGE UP (ref 13–44)
MCHC RBC-ENTMCNC: 29.5 PG — SIGNIFICANT CHANGE UP (ref 27–34)
MCHC RBC-ENTMCNC: 29.5 PG — SIGNIFICANT CHANGE UP (ref 27–34)
MCHC RBC-ENTMCNC: 33 GM/DL — SIGNIFICANT CHANGE UP (ref 32–36)
MCHC RBC-ENTMCNC: 33 GM/DL — SIGNIFICANT CHANGE UP (ref 32–36)
MCV RBC AUTO: 89.5 FL — SIGNIFICANT CHANGE UP (ref 80–100)
MCV RBC AUTO: 89.5 FL — SIGNIFICANT CHANGE UP (ref 80–100)
MONOCYTES # BLD AUTO: 0.7 K/UL — SIGNIFICANT CHANGE UP (ref 0–0.9)
MONOCYTES # BLD AUTO: 0.7 K/UL — SIGNIFICANT CHANGE UP (ref 0–0.9)
MONOCYTES NFR BLD AUTO: 8.3 % — SIGNIFICANT CHANGE UP (ref 2–14)
MONOCYTES NFR BLD AUTO: 8.3 % — SIGNIFICANT CHANGE UP (ref 2–14)
NEUTROPHILS # BLD AUTO: 4.85 K/UL — SIGNIFICANT CHANGE UP (ref 1.8–7.4)
NEUTROPHILS # BLD AUTO: 4.85 K/UL — SIGNIFICANT CHANGE UP (ref 1.8–7.4)
NEUTROPHILS NFR BLD AUTO: 57.7 % — SIGNIFICANT CHANGE UP (ref 43–77)
NEUTROPHILS NFR BLD AUTO: 57.7 % — SIGNIFICANT CHANGE UP (ref 43–77)
NITRITE UR-MCNC: NEGATIVE — SIGNIFICANT CHANGE UP
NITRITE UR-MCNC: NEGATIVE — SIGNIFICANT CHANGE UP
PH UR: 7.5 — SIGNIFICANT CHANGE UP (ref 5–8)
PH UR: 7.5 — SIGNIFICANT CHANGE UP (ref 5–8)
PLATELET # BLD AUTO: 233 K/UL — SIGNIFICANT CHANGE UP (ref 150–400)
PLATELET # BLD AUTO: 233 K/UL — SIGNIFICANT CHANGE UP (ref 150–400)
POTASSIUM SERPL-MCNC: 3.8 MMOL/L — SIGNIFICANT CHANGE UP (ref 3.5–5.3)
POTASSIUM SERPL-MCNC: 3.8 MMOL/L — SIGNIFICANT CHANGE UP (ref 3.5–5.3)
POTASSIUM SERPL-SCNC: 3.8 MMOL/L — SIGNIFICANT CHANGE UP (ref 3.5–5.3)
POTASSIUM SERPL-SCNC: 3.8 MMOL/L — SIGNIFICANT CHANGE UP (ref 3.5–5.3)
PROT SERPL-MCNC: 7.1 GM/DL — SIGNIFICANT CHANGE UP (ref 6–8.3)
PROT SERPL-MCNC: 7.1 GM/DL — SIGNIFICANT CHANGE UP (ref 6–8.3)
PROT UR-MCNC: SIGNIFICANT CHANGE UP MG/DL
PROT UR-MCNC: SIGNIFICANT CHANGE UP MG/DL
PROTHROM AB SERPL-ACNC: 14.8 SEC — HIGH (ref 9.5–13)
PROTHROM AB SERPL-ACNC: 14.8 SEC — HIGH (ref 9.5–13)
RBC # BLD: 4.27 M/UL — SIGNIFICANT CHANGE UP (ref 3.8–5.2)
RBC # BLD: 4.27 M/UL — SIGNIFICANT CHANGE UP (ref 3.8–5.2)
RBC # FLD: 13.5 % — SIGNIFICANT CHANGE UP (ref 10.3–14.5)
RBC # FLD: 13.5 % — SIGNIFICANT CHANGE UP (ref 10.3–14.5)
RBC CASTS # UR COMP ASSIST: 3 /HPF — SIGNIFICANT CHANGE UP (ref 0–4)
RBC CASTS # UR COMP ASSIST: 3 /HPF — SIGNIFICANT CHANGE UP (ref 0–4)
RSV RNA NPH QL NAA+NON-PROBE: SIGNIFICANT CHANGE UP
RSV RNA NPH QL NAA+NON-PROBE: SIGNIFICANT CHANGE UP
SARS-COV-2 RNA SPEC QL NAA+PROBE: DETECTED
SARS-COV-2 RNA SPEC QL NAA+PROBE: DETECTED
SODIUM SERPL-SCNC: 146 MMOL/L — HIGH (ref 135–145)
SODIUM SERPL-SCNC: 146 MMOL/L — HIGH (ref 135–145)
SP GR SPEC: 1.01 — SIGNIFICANT CHANGE UP (ref 1–1.03)
SP GR SPEC: 1.01 — SIGNIFICANT CHANGE UP (ref 1–1.03)
SQUAMOUS # UR AUTO: 0 /HPF — SIGNIFICANT CHANGE UP (ref 0–5)
SQUAMOUS # UR AUTO: 0 /HPF — SIGNIFICANT CHANGE UP (ref 0–5)
TSH SERPL-MCNC: 5.49 UU/ML — HIGH (ref 0.34–4.82)
TSH SERPL-MCNC: 5.49 UU/ML — HIGH (ref 0.34–4.82)
UROBILINOGEN FLD QL: 1 MG/DL — SIGNIFICANT CHANGE UP (ref 0.2–1)
UROBILINOGEN FLD QL: 1 MG/DL — SIGNIFICANT CHANGE UP (ref 0.2–1)
WBC # BLD: 8.41 K/UL — SIGNIFICANT CHANGE UP (ref 3.8–10.5)
WBC # BLD: 8.41 K/UL — SIGNIFICANT CHANGE UP (ref 3.8–10.5)
WBC # FLD AUTO: 8.41 K/UL — SIGNIFICANT CHANGE UP (ref 3.8–10.5)
WBC # FLD AUTO: 8.41 K/UL — SIGNIFICANT CHANGE UP (ref 3.8–10.5)
WBC UR QL: 0 /HPF — SIGNIFICANT CHANGE UP (ref 0–5)
WBC UR QL: 0 /HPF — SIGNIFICANT CHANGE UP (ref 0–5)

## 2023-10-17 PROCEDURE — 71045 X-RAY EXAM CHEST 1 VIEW: CPT | Mod: 26

## 2023-10-17 RX ORDER — DILTIAZEM HCL 120 MG
10 CAPSULE, EXT RELEASE 24 HR ORAL ONCE
Refills: 0 | Status: COMPLETED | OUTPATIENT
Start: 2023-10-17 | End: 2023-10-17

## 2023-10-17 RX ORDER — SODIUM CHLORIDE 9 MG/ML
1000 INJECTION INTRAMUSCULAR; INTRAVENOUS; SUBCUTANEOUS ONCE
Refills: 0 | Status: COMPLETED | OUTPATIENT
Start: 2023-10-17 | End: 2023-10-17

## 2023-10-17 RX ORDER — DILTIAZEM HCL 120 MG
5 CAPSULE, EXT RELEASE 24 HR ORAL ONCE
Refills: 0 | Status: DISCONTINUED | OUTPATIENT
Start: 2023-10-17 | End: 2023-10-17

## 2023-10-17 RX ORDER — OMEGA-3 ACID ETHYL ESTERS 1 G
1 CAPSULE ORAL
Refills: 0 | DISCHARGE

## 2023-10-17 RX ORDER — ACETAMINOPHEN 500 MG
650 TABLET ORAL EVERY 6 HOURS
Refills: 0 | Status: DISCONTINUED | OUTPATIENT
Start: 2023-10-17 | End: 2023-10-20

## 2023-10-17 RX ORDER — CHOLECALCIFEROL (VITAMIN D3) 125 MCG
1 CAPSULE ORAL
Refills: 0 | DISCHARGE

## 2023-10-17 RX ADMIN — Medication 10 MILLIGRAM(S): at 22:55

## 2023-10-17 RX ADMIN — SODIUM CHLORIDE 1000 MILLILITER(S): 9 INJECTION INTRAMUSCULAR; INTRAVENOUS; SUBCUTANEOUS at 21:58

## 2023-10-17 NOTE — ED ADULT TRIAGE NOTE - CHIEF COMPLAINT QUOTE
pt bibems from home c/o palpitations. pt states she was at dinner and began experiencing palpitations, spasms in her shoulders, and fatigue. pt states these symptoms have occurred in the past and was diagnosed with afib. pt takes eliquis, cardizem, and metoprolol. pt NSR at baseline. pt denies cp, sob, dizziness. pt is a/o4 and well appearing in triage. ekg completed in triage.

## 2023-10-17 NOTE — ED PROVIDER NOTE - OBJECTIVE STATEMENT
77 y/o F with a PMhx of HTN presents to the ED c/o palpitations. She had an episode of palpitations and fatigue at 8 pm after eating dinner. Endorses feeling some fatigue during dinner. Has had a hx of these episodes and was diagnosed with afib by Dr. Apple. pt takes eliquis, cardizem, and metoprolol. Denies drinking ETOH at dinner. Denies CP, SOB, LE edema, and cough. Had covid 1-2 weeks ago and went to the hospital, where she was rx abx. Her sx included fatigue, congestion, and general body aches. Endorses smoking. Denies ETOH or IV drug use. Cardiologist: Dr. Apple. PCP: Dr. Joyce.

## 2023-10-17 NOTE — ED PROVIDER NOTE - PROGRESS NOTE DETAILS
79 yo female with a PMH of A fib was started on Eliquis, metoprolol, and Cardizem presents with palpitations and fatigue since approx 8pm tonight after having dinner. Pt took her Metoprolol as soon as she started to feel symptoms. Pt states she also just got over covid 1.5-2 week ago and started to feel this morning  before her symptoms started. + daily smoker. Denies cp, sob, abd pain, etoh use, leg swelling.   EKG with a fib at rate of 123. Will check labs, cxr, meds and reeval. -Keny Patton PA-C Results discussed with pt. trop and remainder of labs unremarkable. HR in the 70s-90s and states she still feels tired. Except for the TSH which was slightly elevated. Offered admission for observation which pt accepted. -Keny Patton PA-C

## 2023-10-17 NOTE — ED ADULT NURSE NOTE - NSFALLUNIVINTERV_ED_ALL_ED
Bed/Stretcher in lowest position, wheels locked, appropriate side rails in place/Call bell, personal items and telephone in reach/Instruct patient to call for assistance before getting out of bed/chair/stretcher/Non-slip footwear applied when patient is off stretcher/[place] to call system/Physically safe environment - no spills, clutter or unnecessary equipment/Purposeful proactive rounding/Room/bathroom lighting operational, light cord in reach

## 2023-10-17 NOTE — ED PROVIDER NOTE - CLINICAL SUMMARY MEDICAL DECISION MAKING FREE TEXT BOX
77 y/o F with hx of afib on eliquis, cardizem, and metroprolol with palpitations and fatigue since 8 pm after eating dinner. EKG shows rapid afib with a rate of 123 bpm. Plan: will check labs including trop, TSH, CXR, and IV cardizem to help control heart rate.

## 2023-10-17 NOTE — PHARMACOTHERAPY INTERVENTION NOTE - COMMENTS
Medication reconciliation completed.  Reviewed Medication list and confirmed med allergies with patient; confirmed with Dr. First Medlarissa.

## 2023-10-17 NOTE — ED PROVIDER NOTE - NS ED ATTENDING STATEMENT MOD
This was a shared visit with the GEOVANNY. I reviewed and verified the documentation and independently performed the documented:

## 2023-10-17 NOTE — ED PROVIDER NOTE - ATTENDING APP SHARED VISIT CONTRIBUTION OF CARE
I, Angelo Steward, DO personally saw the patient with EGOVANNY.  I have personally performed a face to face diagnostic evaluation on this patient.  I have reviewed the GEOVANNY note and agree with the history, exam, and plan of care, except as noted.  I personally saw the patient and performed a substantive portion of the visit including all aspects of the medical decision making.

## 2023-10-17 NOTE — ED ADULT NURSE NOTE - OBJECTIVE STATEMENT
79yo Female TERRANCE co palpitations and fatigue during birthday dinner at 8pm earlier today. Pt states that she has a hx of these episodes and was diagnosed with Afib by MD Apple. Pt takes eliquis, cardizem and metoprolol for Afib. Pt denies ETOH for dinner. Denies CP, SOB, LE Edema and cough, Pt states she had covid 1-2 weeks ago and went to the hospital. Pt states that she felt better from covid this morning. Pt states she has been dehydrated the past few weeks. AOx4, ambulatory, placed on cardiac monitor upon arrival to room and EKG complete.

## 2023-10-18 LAB
ANION GAP SERPL CALC-SCNC: 5 MMOL/L — SIGNIFICANT CHANGE UP (ref 5–17)
ANION GAP SERPL CALC-SCNC: 5 MMOL/L — SIGNIFICANT CHANGE UP (ref 5–17)
BUN SERPL-MCNC: 19 MG/DL — SIGNIFICANT CHANGE UP (ref 7–23)
BUN SERPL-MCNC: 19 MG/DL — SIGNIFICANT CHANGE UP (ref 7–23)
CALCIUM SERPL-MCNC: 8.8 MG/DL — SIGNIFICANT CHANGE UP (ref 8.5–10.1)
CALCIUM SERPL-MCNC: 8.8 MG/DL — SIGNIFICANT CHANGE UP (ref 8.5–10.1)
CHLORIDE SERPL-SCNC: 112 MMOL/L — HIGH (ref 96–108)
CHLORIDE SERPL-SCNC: 112 MMOL/L — HIGH (ref 96–108)
CO2 SERPL-SCNC: 25 MMOL/L — SIGNIFICANT CHANGE UP (ref 22–31)
CO2 SERPL-SCNC: 25 MMOL/L — SIGNIFICANT CHANGE UP (ref 22–31)
CREAT SERPL-MCNC: 0.64 MG/DL — SIGNIFICANT CHANGE UP (ref 0.5–1.3)
CREAT SERPL-MCNC: 0.64 MG/DL — SIGNIFICANT CHANGE UP (ref 0.5–1.3)
EGFR: 89 ML/MIN/1.73M2 — SIGNIFICANT CHANGE UP
EGFR: 89 ML/MIN/1.73M2 — SIGNIFICANT CHANGE UP
GLUCOSE SERPL-MCNC: 131 MG/DL — HIGH (ref 70–99)
GLUCOSE SERPL-MCNC: 131 MG/DL — HIGH (ref 70–99)
POTASSIUM SERPL-MCNC: 4.1 MMOL/L — SIGNIFICANT CHANGE UP (ref 3.5–5.3)
POTASSIUM SERPL-MCNC: 4.1 MMOL/L — SIGNIFICANT CHANGE UP (ref 3.5–5.3)
POTASSIUM SERPL-SCNC: 4.1 MMOL/L — SIGNIFICANT CHANGE UP (ref 3.5–5.3)
POTASSIUM SERPL-SCNC: 4.1 MMOL/L — SIGNIFICANT CHANGE UP (ref 3.5–5.3)
SODIUM SERPL-SCNC: 142 MMOL/L — SIGNIFICANT CHANGE UP (ref 135–145)
SODIUM SERPL-SCNC: 142 MMOL/L — SIGNIFICANT CHANGE UP (ref 135–145)
TROPONIN I, HIGH SENSITIVITY RESULT: 31.77 NG/L — SIGNIFICANT CHANGE UP
TROPONIN I, HIGH SENSITIVITY RESULT: 31.77 NG/L — SIGNIFICANT CHANGE UP
TROPONIN I, HIGH SENSITIVITY RESULT: 37.26 NG/L — SIGNIFICANT CHANGE UP
TROPONIN I, HIGH SENSITIVITY RESULT: 37.26 NG/L — SIGNIFICANT CHANGE UP

## 2023-10-18 PROCEDURE — 97162 PT EVAL MOD COMPLEX 30 MIN: CPT | Mod: GP

## 2023-10-18 PROCEDURE — 36415 COLL VENOUS BLD VENIPUNCTURE: CPT

## 2023-10-18 PROCEDURE — 80048 BASIC METABOLIC PNL TOTAL CA: CPT

## 2023-10-18 PROCEDURE — 85027 COMPLETE CBC AUTOMATED: CPT

## 2023-10-18 PROCEDURE — 97116 GAIT TRAINING THERAPY: CPT | Mod: GP

## 2023-10-18 PROCEDURE — 84484 ASSAY OF TROPONIN QUANT: CPT

## 2023-10-18 PROCEDURE — 83735 ASSAY OF MAGNESIUM: CPT

## 2023-10-18 RX ORDER — INFLUENZA VIRUS VACCINE 15; 15; 15; 15 UG/.5ML; UG/.5ML; UG/.5ML; UG/.5ML
0.7 SUSPENSION INTRAMUSCULAR ONCE
Refills: 0 | Status: COMPLETED | OUTPATIENT
Start: 2023-10-18 | End: 2023-10-18

## 2023-10-18 RX ORDER — FAMOTIDINE 10 MG/ML
20 INJECTION INTRAVENOUS
Refills: 0 | Status: DISCONTINUED | OUTPATIENT
Start: 2023-10-18 | End: 2023-10-20

## 2023-10-18 RX ORDER — METOPROLOL TARTRATE 50 MG
5 TABLET ORAL EVERY 6 HOURS
Refills: 0 | Status: DISCONTINUED | OUTPATIENT
Start: 2023-10-18 | End: 2023-10-20

## 2023-10-18 RX ORDER — METOPROLOL TARTRATE 50 MG
50 TABLET ORAL DAILY
Refills: 0 | Status: DISCONTINUED | OUTPATIENT
Start: 2023-10-18 | End: 2023-10-20

## 2023-10-18 RX ORDER — DILTIAZEM HCL 120 MG
120 CAPSULE, EXT RELEASE 24 HR ORAL DAILY
Refills: 0 | Status: DISCONTINUED | OUTPATIENT
Start: 2023-10-18 | End: 2023-10-19

## 2023-10-18 RX ORDER — APIXABAN 2.5 MG/1
5 TABLET, FILM COATED ORAL
Refills: 0 | Status: DISCONTINUED | OUTPATIENT
Start: 2023-10-18 | End: 2023-10-20

## 2023-10-18 RX ORDER — ASPIRIN/CALCIUM CARB/MAGNESIUM 324 MG
81 TABLET ORAL DAILY
Refills: 0 | Status: DISCONTINUED | OUTPATIENT
Start: 2023-10-18 | End: 2023-10-20

## 2023-10-18 RX ORDER — ATORVASTATIN CALCIUM 80 MG/1
20 TABLET, FILM COATED ORAL AT BEDTIME
Refills: 0 | Status: DISCONTINUED | OUTPATIENT
Start: 2023-10-18 | End: 2023-10-20

## 2023-10-18 RX ADMIN — SODIUM CHLORIDE 1000 MILLILITER(S): 9 INJECTION INTRAMUSCULAR; INTRAVENOUS; SUBCUTANEOUS at 00:04

## 2023-10-18 RX ADMIN — FAMOTIDINE 20 MILLIGRAM(S): 10 INJECTION INTRAVENOUS at 21:30

## 2023-10-18 RX ADMIN — Medication 50 MILLIGRAM(S): at 09:25

## 2023-10-18 RX ADMIN — Medication 81 MILLIGRAM(S): at 09:25

## 2023-10-18 RX ADMIN — ATORVASTATIN CALCIUM 20 MILLIGRAM(S): 80 TABLET, FILM COATED ORAL at 21:30

## 2023-10-18 RX ADMIN — Medication 120 MILLIGRAM(S): at 09:25

## 2023-10-18 RX ADMIN — APIXABAN 5 MILLIGRAM(S): 2.5 TABLET, FILM COATED ORAL at 21:30

## 2023-10-18 RX ADMIN — APIXABAN 5 MILLIGRAM(S): 2.5 TABLET, FILM COATED ORAL at 09:25

## 2023-10-18 NOTE — H&P ADULT - NSHPLABSRESULTS_GEN_ALL_CORE
LABS:                            12.6   8.41  )-----------( 233      ( 17 Oct 2023 21:42 )             38.2     10-17    146<H>  |  111<H>  |  19  ----------------------------<  149<H>  3.8   |  30  |  0.80    Ca    9.3      17 Oct 2023 21:42    TPro  7.1  /  Alb  3.4  /  TBili  0.4  /  DBili  x   /  AST  11<L>  /  ALT  27  /  AlkPhos  110  10-17      LIVER FUNCTIONS - ( 17 Oct 2023 21:42 )  Alb: 3.4 g/dL / Pro: 7.1 gm/dL / ALK PHOS: 110 U/L / ALT: 27 U/L / AST: 11 U/L / GGT: x           PT/INR - ( 17 Oct 2023 21:42 )   PT: 14.8 sec;   INR: 1.32 ratio    PTT - ( 17 Oct 2023 21:42 )  PTT:34.8 sec    Urinalysis Basic - ( 17 Oct 2023 21:42 )  Color: Yellow / Appearance: Clear / S.009 / pH: x  Gluc: 149 mg/dL / Ketone: Negative mg/dL  / Bili: Negative / Urobili: 1.0 mg/dL   Blood: x / Protein: Trace mg/dL / Nitrite: Negative   Leuk Esterase: Negative / RBC: 3 /HPF / WBC 0 /HPF   Sq Epi: x / Non Sq Epi: 0 /HPF / Bacteria: Negative /HPF    RVP Covid  TSH 5.49    EKG Afib RVR

## 2023-10-18 NOTE — H&P ADULT - ASSESSMENT
78 year old female with history of afib on eliquis, htn hld and current smoker presents with palpitations.   Patient reports development of palpitations at dinner time, she denies any chest pain associated with it.  No fever. She was recently diagnosed with COVID 3 days ago so she has been coughing a lot. She denies  any OTC medications.    # Afib with RVR  - Admit to telemetry  - Converted in ED  - EKG now NSR  - Continue cardizem  - Continue metoprolol  - Continue eliquis  - Cardiology consult Zcehariah  - TSH slightly elevated, repeat in 4 weeks  after viral syndrome  - CE x 2  - Lopressor 5 mg ivp x 1 prn     # COVID  - Day 4 of symptoms  - Avoid paxlovid   - Not hypoxic    # CAD  - Stable  - Continue aspirin  - Continue atorvastatin    # Smoker   - education     # DVT prophylaxis  - Eliquis

## 2023-10-18 NOTE — CONSULT NOTE ADULT - SUBJECTIVE AND OBJECTIVE BOX
78 year old female with history of afib who was diagnosed for the first event in July 2023.  Her CHADS-VASC is 4 and she is on Eliquis  She presented with palpitations.  Past med Hx of: HTN, HLD and current smoker presents with palpitations, who tested positive for Covid on 10/17/23  Patient reports development of palpitations at dinner time, she denies any chest pain associated with it.  No fever. She was recently diagnosed with COVID 3 days ago so she has been coughing a lot.    In the ED she was found to be in Afib with RVR, given Cardizem and converted to NSR. She is was admitted for  further treatment.          PAST MEDICAL & SURGICAL HISTORY:  HTN (hypertension)  PAF  No significant past surgical history    SOCIAL HISTORY: Denies  etoh abuse or illicit drug use and is a current smoker    FAMILY HISTORY:  No pertinent family history in first degree relatives          MEDICATIONS  (STANDING):  apixaban 5 milliGRAM(s) Oral two times a day  aspirin enteric coated 81 milliGRAM(s) Oral daily  atorvastatin 20 milliGRAM(s) Oral at bedtime  diltiazem    milliGRAM(s) Oral daily  famotidine    Tablet 20 milliGRAM(s) Oral two times a day  influenza  Vaccine (HIGH DOSE) 0.7 milliLiter(s) IntraMuscular once  metoprolol succinate ER 50 milliGRAM(s) Oral daily    MEDICATIONS  (PRN):  acetaminophen     Tablet .. 650 milliGRAM(s) Oral every 6 hours PRN Mild Pain (1 - 3)  metoprolol tartrate Injectable 5 milliGRAM(s) IV Push every 6 hours PRN HR > 130      Allergies    No Known Allergies    Intolerances        Vital Signs Last 24 Hrs  T(C): 36.3 (18 Oct 2023 10:39), Max: 36.7 (17 Oct 2023 21:30)  T(F): 97.3 (18 Oct 2023 10:39), Max: 98.1 (17 Oct 2023 21:30)  HR: 51 (18 Oct 2023 10:39) (51 - 128)  BP: 148/59 (18 Oct 2023 10:39) (118/71 - 180/118)  BP(mean): 87 (18 Oct 2023 06:30) (85 - 129)  RR: 18 (18 Oct 2023 10:39) (14 - 22)  SpO2: 100% (18 Oct 2023 10:39) (98% - 100%)    Parameters below as of 18 Oct 2023 10:39  Patient On (Oxygen Delivery Method): room air        REVIEW OF SYSTEMS:    CONSTITUTIONAL:  As per HPI.  HEENT:  Eyes:  No diplopia or blurred vision. ENT:  No earache, sore throat or runny nose.  CARDIOVASCULAR:  No pressure, squeezing, strangling, tightness, heaviness or aching about the chest, neck, axilla or epigastrium.  RESPIRATORY:  No cough, shortness of breath, PND or orthopnea.  GASTROINTESTINAL:  No nausea, vomiting or diarrhea.  GENITOURINARY:  No dysuria, frequency or urgency.  MUSCULOSKELETAL:  As per HPI.  SKIN:  No change in skin, hair or nails.  NEUROLOGIC:  No paresthesias, fasciculations, seizures or weakness.  PSYCHIATRIC:  No disorder of thought or mood.  ENDOCRINE:  No heat or cold intolerance, polyuria or polydipsia.  HEMATOLOGICAL:  No easy bruising or bleedings:  .     PHYSICAL EXAMINATION:    GENERAL APPEARANCE:  Pt. is not currently dyspneic, in no distress. Pt. is alert, oriented, and pleasant.  HEENT:  Pupils are normal and react normally. No icterus. Mucous membranes well colored.  NECK:  Supple. No lymphadenopathy. Jugular venous pressure not elevated. Carotids equal.   HEART:   The cardiac impulse has a normal quality. There are no murmurs, rubs or gallops noted  CHEST:  Chest is clear to auscultation. Normal respiratory effort.  ABDOMEN:  Soft and nontender.   EXTREMITIES:  There is no edema.   SKIN:  No rash or significant lesions are noted.    I&O's Summary      LABS:                        12.6   8.41  )-----------( 233      ( 17 Oct 2023 21:42 )             38.2     10-18    142  |  112<H>  |  19  ----------------------------<  131<H>  4.1   |  25  |  0.64    Ca    8.8      18 Oct 2023 08:32    TPro  7.1  /  Alb  3.4  /  TBili  0.4  /  DBili  x   /  AST  11<L>  /  ALT  27  /  AlkPhos  110  10-17    LIVER FUNCTIONS - ( 17 Oct 2023 21:42 )  Alb: 3.4 g/dL / Pro: 7.1 gm/dL / ALK PHOS: 110 U/L / ALT: 27 U/L / AST: 11 U/L / GGT: x           PT/INR - ( 17 Oct 2023 21:42 )   PT: 14.8 sec;   INR: 1.32 ratio         PTT - ( 17 Oct 2023 21:42 )  PTT:34.8 sec      Urinalysis Basic - ( 18 Oct 2023 08:32 )    Color: x / Appearance: x / SG: x / pH: x  Gluc: 131 mg/dL / Ketone: x  / Bili: x / Urobili: x   Blood: x / Protein: x / Nitrite: x   Leuk Esterase: x / RBC: x / WBC x   Sq Epi: x / Non Sq Epi: x / Bacteria: x          EKG:    TELEMETRY:  SB 50-60 BPM    CARDIAC TESTS:    RADIOLOGY & ADDITIONAL STUDIES:    ASSESSMENT & PLAN:      Thank-you for letting the EP Service  participate in the care of your patient.  Mrs. Starkey is an 78 year old female with paroxysmal atrial fib who was diagnosed for the first event in July 2023.  Her CHADS2-VASc score is 4 and she is on Eliquis  She presented with palpitations.  Past med Hx of: HTN, HLD and current smoker presents with palpitations, who tested positive for Covid on 10/17/23  Patient reports development of palpitations at dinner time, she denies any chest pain associated with it.  No fever. She was recently diagnosed with COVID 3 days ago so she has been coughing a lot.    In the ED she was found to be in Afib with RVR, given Cardizem and converted to NSR. She is was admitted for  further treatment.          PAST MEDICAL & SURGICAL HISTORY:  HTN (hypertension)  PAF  No significant past surgical history    SOCIAL HISTORY: Denies  ETOH abuse or illicit drug use and is a current smoker    FAMILY HISTORY:  No pertinent family history in first degree relatives          MEDICATIONS  (STANDING):  apixaban 5 milliGRAM(s) Oral two times a day  aspirin enteric coated 81 milliGRAM(s) Oral daily  atorvastatin 20 milliGRAM(s) Oral at bedtime  diltiazem    milliGRAM(s) Oral daily  famotidine    Tablet 20 milliGRAM(s) Oral two times a day  influenza  Vaccine (HIGH DOSE) 0.7 milliLiter(s) IntraMuscular once  metoprolol succinate ER 50 milliGRAM(s) Oral daily    MEDICATIONS  (PRN):  acetaminophen     Tablet .. 650 milliGRAM(s) Oral every 6 hours PRN Mild Pain (1 - 3)  metoprolol tartrate Injectable 5 milliGRAM(s) IV Push every 6 hours PRN HR > 130      Allergies    No Known Allergies    Intolerances        Vital Signs Last 24 Hrs  T(C): 36.3 (18 Oct 2023 10:39), Max: 36.7 (17 Oct 2023 21:30)  T(F): 97.3 (18 Oct 2023 10:39), Max: 98.1 (17 Oct 2023 21:30)  HR: 51 (18 Oct 2023 10:39) (51 - 128)  BP: 148/59 (18 Oct 2023 10:39) (118/71 - 180/118)  BP(mean): 87 (18 Oct 2023 06:30) (85 - 129)  RR: 18 (18 Oct 2023 10:39) (14 - 22)  SpO2: 100% (18 Oct 2023 10:39) (98% - 100%)    Parameters below as of 18 Oct 2023 10:39  Patient On (Oxygen Delivery Method): room air        REVIEW OF SYSTEMS:    CONSTITUTIONAL:  As per HPI.  HEENT:  Eyes:  No diplopia or blurred vision. ENT:  No earache, sore throat or runny nose.  CARDIOVASCULAR:  No pressure, squeezing, strangling, tightness, heaviness or aching about the chest, neck, axilla or epigastrium.  RESPIRATORY:  No cough, shortness of breath, PND or orthopnea.  GASTROINTESTINAL:  No nausea, vomiting or diarrhea.  GENITOURINARY:  No dysuria, frequency or urgency.  MUSCULOSKELETAL:  As per HPI.  SKIN:  No change in skin, hair or nails.  NEUROLOGIC:  No paresthesias, fasciculations, seizures or weakness.  PSYCHIATRIC:  No disorder of thought or mood.  ENDOCRINE:  No heat or cold intolerance, polyuria or polydipsia.  HEMATOLOGICAL:  No easy bruising or bleedings:  .     PHYSICAL EXAMINATION:    GENERAL APPEARANCE:  Pt. is not currently dyspneic, in no distress. Pt. is alert, oriented, and pleasant.  HEENT:  Pupils are normal and react normally. No icterus. Mucous membranes well colored.  NECK:  Supple. No lymphadenopathy. Jugular venous pressure not elevated. Carotids equal.   HEART:   The cardiac impulse has a normal quality. There are no murmurs, rubs or gallops noted  CHEST:  Chest is clear to auscultation. Normal respiratory effort.  ABDOMEN:  Soft and nontender.   EXTREMITIES:  There is no edema.   SKIN:  No rash or significant lesions are noted.    I&O's Summary      LABS:                        12.6   8.41  )-----------( 233      ( 17 Oct 2023 21:42 )             38.2     10-18    142  |  112<H>  |  19  ----------------------------<  131<H>  4.1   |  25  |  0.64    Ca    8.8      18 Oct 2023 08:32    TPro  7.1  /  Alb  3.4  /  TBili  0.4  /  DBili  x   /  AST  11<L>  /  ALT  27  /  AlkPhos  110  10-17    LIVER FUNCTIONS - ( 17 Oct 2023 21:42 )  Alb: 3.4 g/dL / Pro: 7.1 gm/dL / ALK PHOS: 110 U/L / ALT: 27 U/L / AST: 11 U/L / GGT: x           PT/INR - ( 17 Oct 2023 21:42 )   PT: 14.8 sec;   INR: 1.32 ratio         PTT - ( 17 Oct 2023 21:42 )  PTT:34.8 sec      Urinalysis Basic - ( 18 Oct 2023 08:32 )    Color: x / Appearance: x / SG: x / pH: x  Gluc: 131 mg/dL / Ketone: x  / Bili: x / Urobili: x   Blood: x / Protein: x / Nitrite: x   Leuk Esterase: x / RBC: x / WBC x   Sq Epi: x / Non Sq Epi: x / Bacteria: x          EKG:     TELEMETRY:  SB 50-60 BPM    CARDIAC TESTS:    RADIOLOGY & ADDITIONAL STUDIES:    ASSESSMENT & PLAN:      Thank-you for letting the EP Service  participate in the care of your patient.

## 2023-10-18 NOTE — PATIENT PROFILE ADULT - FALL HARM RISK - HARM RISK INTERVENTIONS

## 2023-10-18 NOTE — CONSULT NOTE ADULT - ASSESSMENT
78 year old with second admission for recurrent AT [facility].  She tends to be bradycardic.  She has been consistent taking Diltiazem 120 mgs and Metoprolol 50 mgs daily. Her lytes have been within normal range.  Her last reported echo at  was in 2019.  Serum Cr .64. She reports having COVID within the last 2 weeks  7/23 EF reported as 60-65%.  She follows with Dr. Apple for cardiology       She is not a candidate for Sotalol due to bradycardia  Continue with Eliquis  Maintain normal   lytes with goal of K > than 4 and Mg level >2  Encourage nicotine cessation  78 year old with second admission for recurrent AT [facility].  She tends to be bradycardic.  She has been consistent taking Diltiazem 120 mgs and Metoprolol 50 mgs daily. Her lytes have been within normal range.  Her last reported echo at  was in 2019.  Serum Cr .64. She reports having COVID within the last 2 weeks  7/23 EF reported as 60-65%.  She follows with Dr. Apple for cardiology       She is not a candidate for Sotalol due to bradycardia  Continue with Eliquis  Maintain normal   lytes with goal of K > than 4 and Mg level >2  Encourage nicotine cessation    The plan was to reduce both the Cardizem and Metoprolol by 1/2 and begin Amiodarone 400 mgs bid x 4 days then reduce to 200 mgs daily,  however the patient and daughter prefer not to make any changes today and will decide tomorrow.  Her daughter feels that this patient AT [facility] might have been provoked from drinking a coke and her nictoine use.   Consider out patient future ablation 78 year old with second admission for recurrent AT [facility].  She tends to be bradycardic.  She has been consistent taking Diltiazem 120 mgs and Metoprolol 50 mgs daily. Her lytes have been within normal range.  Her last reported echo at  was in 2019.  Serum Cr .64. She reports having COVID within the last 2 weeks  7/23 EF reported as 60-65%.  She follows with Dr. Apple for cardiology       She is not a candidate for Sotalol due to bradycardia  Continue with Eliquis  Maintain normal   lytes with goal of K > than 4 and Mg level >2  Encourage nicotine cessation    The plan was to reduce both the Cardizem and Metoprolol by 1/2 and begin Amiodarone 400 mgs bid x 4 days then reduce to 200 mgs daily,  however the patient and daughter prefer not to make any changes today and will decide tomorrow.  Her daughter feels that this patient AT [facility] might have been provoked from drinking a coke and her nictoine use.

## 2023-10-18 NOTE — ED ADULT NURSE REASSESSMENT NOTE - NS ED NURSE REASSESS COMMENT FT1
report received from ERICA Rudolph.  pt resting in bed, states she didn't get any sleep last night and is uncomfortable.  pt helped to make comfortable.  call bell in reach

## 2023-10-18 NOTE — CONSULT NOTE ADULT - NS ATTEND AMEND GEN_ALL_CORE FT
Patient seen and examined. Agree with plan above.
Can consider Antiarrhythmic therapy if patient and family consents.

## 2023-10-18 NOTE — H&P ADULT - NSHPPHYSICALEXAM_GEN_ALL_CORE
ICU Vital Signs Last 24 Hrs  T(C): 36.7 (17 Oct 2023 21:30), Max: 36.7 (17 Oct 2023 21:30)  T(F): 98.1 (17 Oct 2023 21:30), Max: 98.1 (17 Oct 2023 21:30)  HR: 60 (18 Oct 2023 02:42) (51 - 128)  BP: 135/72 (18 Oct 2023 02:42) (121/81 - 180/118)  BP(mean): 89 (18 Oct 2023 02:42) (85 - 129)  RR: 19 (18 Oct 2023 02:42) (14 - 22)  SpO2: 98% (18 Oct 2023 02:42) (98% - 99%)    PHYSICAL EXAM:  GEN: NAD  HEENT:  pupils equal and reactive, EOMI, no oropharyngeal lesions, erythema, exudates, oral thrush  NECK:   supple, no carotid bruits, no palpable lymph nodes, no thyromegaly  CV:  +S1, +S2, regular, no murmurs or rubs  RESP:   lungs clear to auscultation bilaterally, no wheezing, rales, rhonchi, good air entry bilaterally  BREAST:  not examined  GI:  abdomen soft, non-tender, non-distended, normal BS, no bruits, no abdominal masses, no palpable masses  RECTAL:  not examined  :  not examined  MSK:   normal muscle tone, no atrophy, no rigidity, no contractions  EXT:  no clubbing, no cyanosis, no edema, no calf pain, swelling or erythema  VASCULAR:  pulses equal and symmetric in the upper and lower extremities  NEURO:  AAOX3, no focal neurological deficits, follows all commands, able to move extremities spontaneously  SKIN:  no ulcers, lesions or rashes

## 2023-10-18 NOTE — H&P ADULT - HISTORY OF PRESENT ILLNESS
78 year old female with history of afib on eliquis, htn hld and current smoker presents with palpitations.   Patient reports development of palpitations at dinner time, she denies any chest pain associated with it.  No fever. She was recently diagnosed with COVID 3 days ago so she has been coughing a lot. She denies  any OTC medications.    In the ED she was found to be in Afib with RVR, given cardizem and converted to NSR. She is being admitted for  further treatment.

## 2023-10-18 NOTE — CONSULT NOTE ADULT - ASSESSMENT
78 year old female with history of afib on eliquis, htn hld and current smoker presents with palpitations. Patient reports development of palpitations at dinner time, she denies any chest pain associated with it. No fever. She was recently diagnosed with COVID 3 days ago so she has been coughing a lot. She denies any OTC medications. In the ED she was found to be in Afib with RVR, given cardizem and converted to NSR. She is being admitted for further treatment.    #PAF  #HTN  #HLD  #Current Smoker  - Monitor on tele, now NSR  - 7/2023: EF 60-65%, normal left atrial and right atrial size. Mild sclerosis of aortic valve. Trace mitral regurgitation. Mild tricuspid regurgitation.   - Continue Metoprolol and Cardizem  - Continue Eliquis for stroke ppx  - EP consult for possible AAD      Case d/w Dr. Covarrubias

## 2023-10-18 NOTE — CONSULT NOTE ADULT - SUBJECTIVE AND OBJECTIVE BOX
CHIEF COMPLAINT: Patient is a 78y old  Female who presents with a chief complaint of Palpitations (18 Oct 2023 04:33)    FROM H&P: 78 year old female with history of afib on eliquis, htn hld and current smoker presents with palpitations. Patient reports development of palpitations at dinner time, she denies any chest pain associated with it. No fever. She was recently diagnosed with COVID 3 days ago so she has been coughing a lot. She denies any OTC medications. In the ED she was found to be in Afib with RVR, given cardizem and converted to NSR. She is being admitted for further treatment.  (18 Oct 2023 04:33)    Cardiology consulted for afib  Patient reports she had COVID within last 2 weeks was "really sick" not hospitalized  last night went out to dinner drank coke and ate greasy foods which she thought  may have triggered this    PAST MEDICAL & SURGICAL HISTORY:  HTN (hypertension)  HLD  Hip surgery    FAMILY HISTORY:  Father with heart disease-- denies afib hx.      Social History:    +TOB use.  5 cigs per day.  Ongoing for last 60 years.  since teenage years.    No ETOH use.      MEDICATIONS:  apixaban 5 milliGRAM(s) Oral two times a day  aspirin enteric coated 81 milliGRAM(s) Oral daily  atorvastatin 20 milliGRAM(s) Oral at bedtime  diltiazem    milliGRAM(s) Oral daily  famotidine    Tablet 20 milliGRAM(s) Oral two times a day  metoprolol succinate ER 50 milliGRAM(s) Oral daily    MEDICATIONS  (PRN):  acetaminophen     Tablet .. 650 milliGRAM(s) Oral every 6 hours PRN Mild Pain (1 - 3)  metoprolol tartrate Injectable 5 milliGRAM(s) IV Push every 6 hours PRN HR > 130    HOME MEDICATIONS:  aspirin 81 mg oral delayed release tablet: 1 tab(s) orally once a day (at bedtime) (17 Oct 2023 23:52)  calcipotriene 0.005% topical cream: Apply topically to affected area 2 times a day as needed for - (17 Oct 2023 23:52)  halobetasol 0.05% topical ointment: Apply topically to affected area 2 times a day as needed for - (17 Oct 2023 23:52)  metoprolol succinate 50 mg oral tablet, extended release: 1 tab(s) orally once a day (at bedtime) (17 Oct 2023 23:52)    PHYSICAL EXAM:  T(C): 36.3 (18 Oct 2023 10:39), Max: 36.7 (17 Oct 2023 21:30)  T(F): 97.3 (18 Oct 2023 10:39), Max: 98.1 (17 Oct 2023 21:30)  HR: 51 (18 Oct 2023 10:39) (51 - 128)  BP: 148/59 (18 Oct 2023 10:39) (118/71 - 180/118)  BP(mean): 87 (18 Oct 2023 06:30) (85 - 129)  RR: 18 (18 Oct 2023 10:39) (14 - 22)  SpO2: 100% (18 Oct 2023 10:39) (98% - 100%)    Parameters below as of 18 Oct 2023 10:39  Patient On (Oxygen Delivery Method): room air    Constitutional: NAD, awake and alert  HEENT: PERR, EOMI, Normal Hearing, MMM  Neck: Soft and supple, No LAD, No JVD  Respiratory: Breath sounds are clear bilaterally, No wheezing, rales or rhonchi  Cardiovascular: S1 and S2, regular rate and rhythm, no Murmurs, gallops or rubs  Gastrointestinal: Bowel Sounds present, soft, nontender, nondistended, no guarding, no rebound  Extremities: No peripheral edema  Vascular: 2+ peripheral pulses  Neurological: A/O x 3, no focal deficits  Musculoskeletal: 5/5 strength b/l upper and lower extremities  Skin: No rashes    =======================================    INTERPRETATION OF TELEMETRY: SR - SB    ECG:  Afib RVR    ========================================    LABS:                        12.6   8.41  )-----------( 233      ( 17 Oct 2023 21:42 )             38.2     10-18    142  |  112<H>  |  19  ----------------------------<  131<H>  4.1   |  25  |  0.64    Ca    8.8      18 Oct 2023 08:32    TPro  7.1  /  Alb  3.4  /  TBili  0.4  /  DBili  x   /  AST  11<L>  /  ALT  27  /  AlkPhos  110  10-17    PT/INR - ( 17 Oct 2023 21:42 )   PT: 14.8 sec;   INR: 1.32 ratio       PTT - ( 17 Oct 2023 21:42 )  PTT:34.8 sec    Trop negative     TSH 5.49        RADIOLOGY & ADDITIONAL STUDIES:    10/17/23: Xray Chest 1 View: No acute finding or change. Chronic bilateral apical thickening.

## 2023-10-18 NOTE — PATIENT PROFILE ADULT - FUNCTIONAL ASSESSMENT - DAILY ACTIVITY 2.
This document is complete and the patient is ready for discharge.
4 = No assist / stand by assistance

## 2023-10-19 ENCOUNTER — TRANSCRIPTION ENCOUNTER (OUTPATIENT)
Age: 78
End: 2023-10-19

## 2023-10-19 DIAGNOSIS — I48.91 UNSPECIFIED ATRIAL FIBRILLATION: ICD-10-CM

## 2023-10-19 LAB
ANION GAP SERPL CALC-SCNC: 4 MMOL/L — LOW (ref 5–17)
ANION GAP SERPL CALC-SCNC: 4 MMOL/L — LOW (ref 5–17)
BUN SERPL-MCNC: 16 MG/DL — SIGNIFICANT CHANGE UP (ref 7–23)
BUN SERPL-MCNC: 16 MG/DL — SIGNIFICANT CHANGE UP (ref 7–23)
CALCIUM SERPL-MCNC: 8.9 MG/DL — SIGNIFICANT CHANGE UP (ref 8.5–10.1)
CALCIUM SERPL-MCNC: 8.9 MG/DL — SIGNIFICANT CHANGE UP (ref 8.5–10.1)
CHLORIDE SERPL-SCNC: 110 MMOL/L — HIGH (ref 96–108)
CHLORIDE SERPL-SCNC: 110 MMOL/L — HIGH (ref 96–108)
CO2 SERPL-SCNC: 29 MMOL/L — SIGNIFICANT CHANGE UP (ref 22–31)
CO2 SERPL-SCNC: 29 MMOL/L — SIGNIFICANT CHANGE UP (ref 22–31)
CREAT SERPL-MCNC: 0.65 MG/DL — SIGNIFICANT CHANGE UP (ref 0.5–1.3)
CREAT SERPL-MCNC: 0.65 MG/DL — SIGNIFICANT CHANGE UP (ref 0.5–1.3)
EGFR: 89 ML/MIN/1.73M2 — SIGNIFICANT CHANGE UP
EGFR: 89 ML/MIN/1.73M2 — SIGNIFICANT CHANGE UP
GLUCOSE SERPL-MCNC: 125 MG/DL — HIGH (ref 70–99)
GLUCOSE SERPL-MCNC: 125 MG/DL — HIGH (ref 70–99)
HCT VFR BLD CALC: 35.2 % — SIGNIFICANT CHANGE UP (ref 34.5–45)
HCT VFR BLD CALC: 35.2 % — SIGNIFICANT CHANGE UP (ref 34.5–45)
HGB BLD-MCNC: 11.5 G/DL — SIGNIFICANT CHANGE UP (ref 11.5–15.5)
HGB BLD-MCNC: 11.5 G/DL — SIGNIFICANT CHANGE UP (ref 11.5–15.5)
MCHC RBC-ENTMCNC: 29.3 PG — SIGNIFICANT CHANGE UP (ref 27–34)
MCHC RBC-ENTMCNC: 29.3 PG — SIGNIFICANT CHANGE UP (ref 27–34)
MCHC RBC-ENTMCNC: 32.7 GM/DL — SIGNIFICANT CHANGE UP (ref 32–36)
MCHC RBC-ENTMCNC: 32.7 GM/DL — SIGNIFICANT CHANGE UP (ref 32–36)
MCV RBC AUTO: 89.6 FL — SIGNIFICANT CHANGE UP (ref 80–100)
MCV RBC AUTO: 89.6 FL — SIGNIFICANT CHANGE UP (ref 80–100)
PLATELET # BLD AUTO: 213 K/UL — SIGNIFICANT CHANGE UP (ref 150–400)
PLATELET # BLD AUTO: 213 K/UL — SIGNIFICANT CHANGE UP (ref 150–400)
POTASSIUM SERPL-MCNC: 3.9 MMOL/L — SIGNIFICANT CHANGE UP (ref 3.5–5.3)
POTASSIUM SERPL-MCNC: 3.9 MMOL/L — SIGNIFICANT CHANGE UP (ref 3.5–5.3)
POTASSIUM SERPL-SCNC: 3.9 MMOL/L — SIGNIFICANT CHANGE UP (ref 3.5–5.3)
POTASSIUM SERPL-SCNC: 3.9 MMOL/L — SIGNIFICANT CHANGE UP (ref 3.5–5.3)
RBC # BLD: 3.93 M/UL — SIGNIFICANT CHANGE UP (ref 3.8–5.2)
RBC # BLD: 3.93 M/UL — SIGNIFICANT CHANGE UP (ref 3.8–5.2)
RBC # FLD: 13.7 % — SIGNIFICANT CHANGE UP (ref 10.3–14.5)
RBC # FLD: 13.7 % — SIGNIFICANT CHANGE UP (ref 10.3–14.5)
SODIUM SERPL-SCNC: 143 MMOL/L — SIGNIFICANT CHANGE UP (ref 135–145)
SODIUM SERPL-SCNC: 143 MMOL/L — SIGNIFICANT CHANGE UP (ref 135–145)
WBC # BLD: 6.24 K/UL — SIGNIFICANT CHANGE UP (ref 3.8–10.5)
WBC # BLD: 6.24 K/UL — SIGNIFICANT CHANGE UP (ref 3.8–10.5)
WBC # FLD AUTO: 6.24 K/UL — SIGNIFICANT CHANGE UP (ref 3.8–10.5)
WBC # FLD AUTO: 6.24 K/UL — SIGNIFICANT CHANGE UP (ref 3.8–10.5)

## 2023-10-19 PROCEDURE — 99232 SBSQ HOSP IP/OBS MODERATE 35: CPT

## 2023-10-19 RX ORDER — ASPIRIN/CALCIUM CARB/MAGNESIUM 324 MG
1 TABLET ORAL
Refills: 0 | DISCHARGE

## 2023-10-19 RX ORDER — DRONEDARONE 400 MG/1
1 TABLET, FILM COATED ORAL
Qty: 90 | Refills: 0
Start: 2023-10-19

## 2023-10-19 RX ORDER — HALOBETASOL PROPIONATE 0.5 MG/G
1 OINTMENT TOPICAL
Refills: 0 | DISCHARGE

## 2023-10-19 RX ORDER — CALCIPOTRIENE 50 UG/G
1 CREAM TOPICAL
Refills: 0 | DISCHARGE

## 2023-10-19 RX ORDER — DOFETILIDE 0.25 MG/1
1 CAPSULE ORAL
Qty: 90 | Refills: 0
Start: 2023-10-19

## 2023-10-19 RX ADMIN — ATORVASTATIN CALCIUM 20 MILLIGRAM(S): 80 TABLET, FILM COATED ORAL at 21:37

## 2023-10-19 RX ADMIN — FAMOTIDINE 20 MILLIGRAM(S): 10 INJECTION INTRAVENOUS at 21:38

## 2023-10-19 RX ADMIN — FAMOTIDINE 20 MILLIGRAM(S): 10 INJECTION INTRAVENOUS at 10:31

## 2023-10-19 RX ADMIN — APIXABAN 5 MILLIGRAM(S): 2.5 TABLET, FILM COATED ORAL at 10:31

## 2023-10-19 RX ADMIN — Medication 81 MILLIGRAM(S): at 10:31

## 2023-10-19 RX ADMIN — Medication 50 MILLIGRAM(S): at 10:31

## 2023-10-19 RX ADMIN — APIXABAN 5 MILLIGRAM(S): 2.5 TABLET, FILM COATED ORAL at 21:37

## 2023-10-19 NOTE — DISCHARGE NOTE NURSING/CASE MANAGEMENT/SOCIAL WORK - NSDCPEFALRISK_GEN_ALL_CORE
For information on Fall & Injury Prevention, visit: [email or web address] OR  [email or web address] OR  https://www.cdc.gov/steadi/patient.html

## 2023-10-19 NOTE — DISCHARGE NOTE NURSING/CASE MANAGEMENT/SOCIAL WORK - PATIENT PORTAL LINK FT
You can access the FollowMyHealth Patient Portal offered by [facility] by registering at the following website: [email or web address]. By joining [facility]’s FollowMyHealth portal, you will also be able to view your health information using other applications (apps) compatible with our system.

## 2023-10-20 ENCOUNTER — TRANSCRIPTION ENCOUNTER (OUTPATIENT)
Age: 78
End: 2023-10-20

## 2023-10-20 VITALS
HEART RATE: 55 BPM | TEMPERATURE: 99 F | OXYGEN SATURATION: 97 % | SYSTOLIC BLOOD PRESSURE: 147 MMHG | DIASTOLIC BLOOD PRESSURE: 59 MMHG | RESPIRATION RATE: 18 BRPM

## 2023-10-20 LAB
ANION GAP SERPL CALC-SCNC: 4 MMOL/L — LOW (ref 5–17)
ANION GAP SERPL CALC-SCNC: 4 MMOL/L — LOW (ref 5–17)
BUN SERPL-MCNC: 16 MG/DL — SIGNIFICANT CHANGE UP (ref 7–23)
BUN SERPL-MCNC: 16 MG/DL — SIGNIFICANT CHANGE UP (ref 7–23)
CALCIUM SERPL-MCNC: 8.8 MG/DL — SIGNIFICANT CHANGE UP (ref 8.5–10.1)
CALCIUM SERPL-MCNC: 8.8 MG/DL — SIGNIFICANT CHANGE UP (ref 8.5–10.1)
CHLORIDE SERPL-SCNC: 114 MMOL/L — HIGH (ref 96–108)
CHLORIDE SERPL-SCNC: 114 MMOL/L — HIGH (ref 96–108)
CO2 SERPL-SCNC: 26 MMOL/L — SIGNIFICANT CHANGE UP (ref 22–31)
CO2 SERPL-SCNC: 26 MMOL/L — SIGNIFICANT CHANGE UP (ref 22–31)
CREAT SERPL-MCNC: 0.6 MG/DL — SIGNIFICANT CHANGE UP (ref 0.5–1.3)
CREAT SERPL-MCNC: 0.6 MG/DL — SIGNIFICANT CHANGE UP (ref 0.5–1.3)
EGFR: 91 ML/MIN/1.73M2 — SIGNIFICANT CHANGE UP
EGFR: 91 ML/MIN/1.73M2 — SIGNIFICANT CHANGE UP
GLUCOSE SERPL-MCNC: 123 MG/DL — HIGH (ref 70–99)
GLUCOSE SERPL-MCNC: 123 MG/DL — HIGH (ref 70–99)
HCT VFR BLD CALC: 34 % — LOW (ref 34.5–45)
HCT VFR BLD CALC: 34 % — LOW (ref 34.5–45)
HGB BLD-MCNC: 11.2 G/DL — LOW (ref 11.5–15.5)
HGB BLD-MCNC: 11.2 G/DL — LOW (ref 11.5–15.5)
MAGNESIUM SERPL-MCNC: 2.2 MG/DL — SIGNIFICANT CHANGE UP (ref 1.6–2.6)
MAGNESIUM SERPL-MCNC: 2.2 MG/DL — SIGNIFICANT CHANGE UP (ref 1.6–2.6)
MCHC RBC-ENTMCNC: 29.2 PG — SIGNIFICANT CHANGE UP (ref 27–34)
MCHC RBC-ENTMCNC: 29.2 PG — SIGNIFICANT CHANGE UP (ref 27–34)
MCHC RBC-ENTMCNC: 32.9 GM/DL — SIGNIFICANT CHANGE UP (ref 32–36)
MCHC RBC-ENTMCNC: 32.9 GM/DL — SIGNIFICANT CHANGE UP (ref 32–36)
MCV RBC AUTO: 88.5 FL — SIGNIFICANT CHANGE UP (ref 80–100)
MCV RBC AUTO: 88.5 FL — SIGNIFICANT CHANGE UP (ref 80–100)
PLATELET # BLD AUTO: 188 K/UL — SIGNIFICANT CHANGE UP (ref 150–400)
PLATELET # BLD AUTO: 188 K/UL — SIGNIFICANT CHANGE UP (ref 150–400)
POTASSIUM SERPL-MCNC: 3.9 MMOL/L — SIGNIFICANT CHANGE UP (ref 3.5–5.3)
POTASSIUM SERPL-MCNC: 3.9 MMOL/L — SIGNIFICANT CHANGE UP (ref 3.5–5.3)
POTASSIUM SERPL-SCNC: 3.9 MMOL/L — SIGNIFICANT CHANGE UP (ref 3.5–5.3)
POTASSIUM SERPL-SCNC: 3.9 MMOL/L — SIGNIFICANT CHANGE UP (ref 3.5–5.3)
RBC # BLD: 3.84 M/UL — SIGNIFICANT CHANGE UP (ref 3.8–5.2)
RBC # BLD: 3.84 M/UL — SIGNIFICANT CHANGE UP (ref 3.8–5.2)
RBC # FLD: 13.8 % — SIGNIFICANT CHANGE UP (ref 10.3–14.5)
RBC # FLD: 13.8 % — SIGNIFICANT CHANGE UP (ref 10.3–14.5)
SODIUM SERPL-SCNC: 144 MMOL/L — SIGNIFICANT CHANGE UP (ref 135–145)
SODIUM SERPL-SCNC: 144 MMOL/L — SIGNIFICANT CHANGE UP (ref 135–145)
WBC # BLD: 5.92 K/UL — SIGNIFICANT CHANGE UP (ref 3.8–10.5)
WBC # BLD: 5.92 K/UL — SIGNIFICANT CHANGE UP (ref 3.8–10.5)
WBC # FLD AUTO: 5.92 K/UL — SIGNIFICANT CHANGE UP (ref 3.8–10.5)
WBC # FLD AUTO: 5.92 K/UL — SIGNIFICANT CHANGE UP (ref 3.8–10.5)

## 2023-10-20 PROCEDURE — 99239 HOSP IP/OBS DSCHRG MGMT >30: CPT

## 2023-10-20 RX ORDER — METOPROLOL TARTRATE 50 MG
25 TABLET ORAL DAILY
Refills: 0 | Status: DISCONTINUED | OUTPATIENT
Start: 2023-10-21 | End: 2023-10-20

## 2023-10-20 RX ORDER — POTASSIUM CHLORIDE 20 MEQ
40 PACKET (EA) ORAL ONCE
Refills: 0 | Status: COMPLETED | OUTPATIENT
Start: 2023-10-20 | End: 2023-10-20

## 2023-10-20 RX ORDER — METOPROLOL TARTRATE 50 MG
1 TABLET ORAL
Qty: 0 | Refills: 0 | DISCHARGE
Start: 2023-10-20

## 2023-10-20 RX ORDER — METOPROLOL TARTRATE 50 MG
1 TABLET ORAL
Refills: 0 | DISCHARGE

## 2023-10-20 RX ORDER — METOPROLOL TARTRATE 50 MG
1 TABLET ORAL
Qty: 30 | Refills: 0
Start: 2023-10-20 | End: 2023-11-18

## 2023-10-20 RX ORDER — APIXABAN 2.5 MG/1
1 TABLET, FILM COATED ORAL
Qty: 0 | Refills: 0 | DISCHARGE
Start: 2023-10-20

## 2023-10-20 RX ADMIN — Medication 40 MILLIEQUIVALENT(S): at 11:05

## 2023-10-20 RX ADMIN — Medication 81 MILLIGRAM(S): at 11:04

## 2023-10-20 RX ADMIN — FAMOTIDINE 20 MILLIGRAM(S): 10 INJECTION INTRAVENOUS at 11:04

## 2023-10-20 RX ADMIN — APIXABAN 5 MILLIGRAM(S): 2.5 TABLET, FILM COATED ORAL at 11:04

## 2023-10-20 NOTE — PROGRESS NOTE ADULT - ASSESSMENT
78 year old female with history of afib on eliquis, htn hld and current smoker presents with palpitations.   Patient reports development of palpitations at dinner time, she denies any chest pain associated with it.  No fever. She was recently diagnosed with COVID 3 days ago so she has been coughing a lot. She denies  any OTC medications.    In the ED she was found to be in Afib with RVR, given cardizem and converted to NSR. She is being admitted for  further treatment. # Afib with RVR  - Converted in ED  - EKG now NSR  - Continue cardizem  - Continue metoprolol  - Continue eliquis  - TSH slightly elevated, repeat in 4 weeks  after viral syndrome  - CE x 2 negative  - EP eval and Cardio eval appreciated  - Await decision on medications    # COVID  - Day 4 of symptoms  - Avoid paxlovid   - Not hypoxic    # CAD  - Stable  - Continue aspirin  - Continue atorvastatin    # Smoker   - education     # DVT prophylaxis  - Eliquis    
Assessment&Plan:  78 year old with second admission for recurrent P-AF  She tends to be bradycardic.  She has been consistent taking Diltiazem 120 mgs and Metoprolol 50 mgs daily. Her lytes have been within normal range.  Her last reported echo at  was in 2019.  Serum Cr .64. She reports having COVID within the last 2 weeks  7/23 EF reported as 60-65%.  She follows with Dr. Apple for cardiology      Paroxsymal Afib with RVR, spontaneously converted to NSR, now bradycardic  Will discontinue Diltiazem PO, continue Toprol 50mg PO daily at this time, may need to decrease for HR  ChadsVasc 4, Continue Eliquis uninterrupted  Patient would benefit from antiarrythmic to maintain NSR  Discussed Amiodarone with patient and she is very concerned about possible side effects  Would prefer not to stay inpatient to start Tikosyn or Sotalol  Will see if Multaq is covered  Further recs pending medication approval  Plan discussed with patient, cardiology team and Dr. Saavedra
78 year old female with history of afib on eliquis, htn hld and current smoker presents with palpitations. Patient reports development of palpitations at dinner time, she denies any chest pain associated with it. No fever. She was recently diagnosed with COVID 3 days ago so she has been coughing a lot. She denies any OTC medications. In the ED she was found to be in Afib with RVR, given cardizem and converted to NSR. She is being admitted for further treatment.    #PAF  #HTN  #HLD  #Current Smoker    - Monitor on tele, now NSR  - 7/2023: EF 60-65%, normal left atrial and right atrial size. Mild sclerosis of aortic valve. Trace mitral regurgitation. Mild tricuspid regurgitation.   - Continue Metoprolol   - Continue Eliquis for stroke ppx  - Off Cardizem   - EP consult appreciated   - Discussed at length antiarrythmic therapy. Undecided at this time  DC home and outpatient follow up with cardiology.    Case d/w Dr. Apple  Appointment made for 10/30 @[facility] for follow up  Will sign off, call with questions.  
78 year old female with history of afib on eliquis, htn hld and current smoker presents with palpitations. Patient reports development of palpitations at dinner time, she denies any chest pain associated with it. No fever. She was recently diagnosed with COVID 3 days ago so she has been coughing a lot. She denies any OTC medications. In the ED she was found to be in Afib with RVR, given cardizem and converted to NSR. She is being admitted for further treatment.    #PAF  #HTN  #HLD  #Current Smoker  - Monitor on tele, now NSR  - 7/2023: EF 60-65%, normal left atrial and right atrial size. Mild sclerosis of aortic valve. Trace mitral regurgitation. Mild tricuspid regurgitation.   - Continue Metoprolol   - Continue Eliquis for stroke ppx  - EP consult appreciated   - We also Discussed Amiodarone with patient and she is very concerned about possible side effects, other options include Tikosyn      Case d/w Dr. Pak  Will follow  Appointment made for 10/30 @[facility] for follow up  
Patient/Caregiver provided printed discharge information.

## 2023-10-20 NOTE — DISCHARGE NOTE PROVIDER - PROVIDER RX CONTACT NUMBER
Patient on room air with documented SATS and in no apparent distress. Will continue to monitor.    [phone]

## 2023-10-20 NOTE — DISCHARGE NOTE PROVIDER - HOSPITAL COURSE
In the ED she was found to be in Afib with RVR, given cardizem and converted to NSR. She is being admitted for  further treatment. # Afib with RVR  - Converted in ED  - EKG now NSR  - Continue cardizem  - Continue metoprolol  - Continue eliquis  - TSH slightly elevated, repeat in 4 weeks  after viral syndrome  - CE x 2 negative  - EP eval and Cardio eval appreciated  - Patient doesn't want to start tikosyn, will dc home  and fu with cardiology    # COVID  - Day 4 of symptoms  - Avoid paxlovid   - Not hypoxic    # CAD  - Stable  - Continue aspirin  - Continue atorvastatin    # Smoker   - education     # DVT prophylaxis  - Eliquis

## 2023-10-20 NOTE — DISCHARGE NOTE PROVIDER - CARE PROVIDER_API CALL
Juan Apple  Cardiology  [address] [place] [address]  Phone: [phone]  Fax: [phone]  Follow Up Time:   
2018

## 2023-10-20 NOTE — CHART NOTE - NSCHARTNOTEFT_GEN_A_CORE
Patient requires a rolling walker for d/c to home due to diagnosis of : Afib and debility   Patient requires a bedside commode for d/c home due to patient is at risk to fall and cannot walk safely to the bathroom.
Patient requires a rolling walker for d/c to home due to diagnosis of osteoarthritis, Afib and weakness This is to help assist the patient with walking safely and to assist with ADL's.
patient requires rolaitor due to inability to ambulate independently as well as requires seat to take breaks when going distances. Rolaitor required to ambulate safely.
HPI:  Mrs. Starkey is an 78 year old female with paroxysmal atrial fib who was diagnosed for the first event in July 2023.  Her CHADS2-VASc score is 4 and she is on Eliquis  She presented with palpitations.  Past med Hx of: HTN, HLD and current smoker presents with palpitations, who tested positive for Covid on 10/17/23  Patient reports development of palpitations at dinner time, she denies any chest pain associated with it.  No fever. She was recently had COVID a few weeks ago.  In the ED she was found to be in Afib with RVR, given Cardizem and converted to NSR. She is was admitted for  further treatment.      10/19/23 Patient seent at bedside, denies any CP, palpitations, SOB, dizziness, lightheadedness.  TELE: SR 50s, 30s with sleep  10/20/23: SR 70-80s, bradycardia with sleep    MEDICATIONS  (STANDING):  apixaban 5 milliGRAM(s) Oral two times a day  aspirin enteric coated 81 milliGRAM(s) Oral daily  atorvastatin 20 milliGRAM(s) Oral at bedtime  famotidine    Tablet 20 milliGRAM(s) Oral two times a day  influenza  Vaccine (HIGH DOSE) 0.7 milliLiter(s) IntraMuscular once  metoprolol succinate ER 50 milliGRAM(s) Oral daily  potassium chloride    Tablet ER 40 milliEquivalent(s) Oral once    MEDICATIONS  (PRN):  acetaminophen     Tablet .. 650 milliGRAM(s) Oral every 6 hours PRN Mild Pain (1 - 3)  metoprolol tartrate Injectable 5 milliGRAM(s) IV Push every 6 hours PRN HR > 130    Vital Signs Last 24 Hrs  T(C): 37.1 (20 Oct 2023 07:40), Max: 37.1 (20 Oct 2023 07:40)  T(F): 98.7 (20 Oct 2023 07:40), Max: 98.7 (20 Oct 2023 07:40)  HR: 55 (20 Oct 2023 07:40) (52 - 55)  BP: 147/59 (20 Oct 2023 07:40) (146/64 - 147/59)  RR: 18 (20 Oct 2023 07:40) (18 - 18)  SpO2: 97% (20 Oct 2023 07:40) (97% - 99%)    Parameters below as of 20 Oct 2023 07:40  Patient On (Oxygen Delivery Method): room air    10-20    144  |  114<H>  |  16  ----------------------------<  123<H>  3.9   |  26  |  0.60    Ca    8.8      20 Oct 2023 05:56  Mg     2.2     10-20                          11.2   5.92  )-----------( 188      ( 20 Oct 2023 05:56 )             34.0   Plan:  Patient has decided against staying for Tikosyn loading and would like to continue with rate control only  Patient with some bradycardia  with sleep, will decrease Toprol to 25mg PO daily  Continue Eliquis uninterrupted  Consider outpatient cardiac monitor through [facility]  Patient will follow up with Dr. Apple and [facility] and can schedule outpatient EP follow up at [facility]  Dispo per medicine  Plan discussed with patient, RN, cardiology team and Dr. Saavedra

## 2023-10-20 NOTE — DISCHARGE NOTE PROVIDER - NSDCFUSCHEDAPPT_GEN_ALL_CORE_FT
Angelo Rubio  [facility]  [facility] [address]  Scheduled Appointment: 12/08/2023    Irais Joyce  [facility]  [facility] [address]  Scheduled Appointment: 12/20/2023

## 2023-10-20 NOTE — PHYSICAL THERAPY INITIAL EVALUATION ADULT - NSPTDMEREC_GEN_A_CORE
Pt will require a Rollator walker at home due to their dx of difficulty walking to help complete their MRADL's.

## 2023-10-20 NOTE — PHYSICAL THERAPY INITIAL EVALUATION ADULT - PERTINENT HX OF CURRENT PROBLEM, REHAB EVAL
78 year old female with history of afib on eliquis, htn hld and current smoker presents with palpitations. She was recently diagnosed with COVID 3 days ago so she has been coughing a lot. In the ED she was found to be in Afib with RVR, given cardizem and converted to NSR

## 2023-10-20 NOTE — PROGRESS NOTE ADULT - SUBJECTIVE AND OBJECTIVE BOX
HOSPITALIST ATTENDING PROGRESS NOTE    Chart and meds reviewed.  Patient seen and examined.    CC: palpitations    Subjective: No acute issues overnight. No palpitations or chest pain.     All other systems reviewed and found to be negative with the exception of what has been described above.    MEDICATIONS  (STANDING):  apixaban 5 milliGRAM(s) Oral two times a day  aspirin enteric coated 81 milliGRAM(s) Oral daily  atorvastatin 20 milliGRAM(s) Oral at bedtime  famotidine    Tablet 20 milliGRAM(s) Oral two times a day  influenza  Vaccine (HIGH DOSE) 0.7 milliLiter(s) IntraMuscular once  metoprolol succinate ER 50 milliGRAM(s) Oral daily    MEDICATIONS  (PRN):  acetaminophen     Tablet .. 650 milliGRAM(s) Oral every 6 hours PRN Mild Pain (1 - 3)  metoprolol tartrate Injectable 5 milliGRAM(s) IV Push every 6 hours PRN HR > 130      HEENT:  pupils equal and reactive, EOMI, no oropharyngeal lesions, erythema, exudates, oral thrush  NECK:   supple, no carotid bruits, no palpable lymph nodes, no thyromegaly  CV:  +S1, +S2, regular, no murmurs or rubs  RESP:   lungs clear to auscultation bilaterally, no wheezing, rales, rhonchi, good air entry bilaterally  BREAST:  not examined  GI:  abdomen soft, non-tender, non-distended, normal BS, no bruits, no abdominal masses, no palpable masses  RECTAL:  not examined  :  not examined  MSK:   normal muscle tone, no atrophy, no rigidity, no contractions  EXT:  no clubbing, no cyanosis, no edema, no calf pain, swelling or erythema  VASCULAR:  pulses equal and symmetric in the upper and lower extremities  NEURO:  AAOX3, no focal neurological deficits, follows all commands, able to move extremities spontaneously  SKIN:  no ulcers, lesions or rashes    LABS:                            11.5   6.24  )-----------( 213      ( 19 Oct 2023 06:14 )             35.2     10-19    143  |  110<H>  |  16  ----------------------------<  125<H>  3.9   |  29  |  0.65    Ca    8.9      19 Oct 2023 06:14    TPro  7.1  /  Alb  3.4  /  TBili  0.4  /  DBili  x   /  AST  11<L>  /  ALT  27  /  AlkPhos  110  10-17        LIVER FUNCTIONS - ( 17 Oct 2023 21:42 )  Alb: 3.4 g/dL / Pro: 7.1 gm/dL / ALK PHOS: 110 U/L / ALT: 27 U/L / AST: 11 U/L / GGT: x           PT/INR - ( 17 Oct 2023 21:42 )   PT: 14.8 sec;   INR: 1.32 ratio    PTT - ( 17 Oct 2023 21:42 )  PTT:34.8 sec    Urinalysis Basic - ( 19 Oct 2023 06:14 )  Color: x / Appearance: x / SG: x / pH: x  Gluc: 125 mg/dL / Ketone: x  / Bili: x / Urobili: x   Blood: x / Protein: x / Nitrite: x   Leuk Esterase: x / RBC: x / WBC x   Sq Epi: x / Non Sq Epi: x / Bacteria: x          
HPI:  Mrs. Starkey is an 78 year old female with paroxysmal atrial fib who was diagnosed for the first event in July 2023.  Her CHADS2-VASc score is 4 and she is on Eliquis  She presented with palpitations.  Past med Hx of: HTN, HLD and current smoker presents with palpitations, who tested positive for Covid on 10/17/23  Patient reports development of palpitations at dinner time, she denies any chest pain associated with it.  No fever. She was recently had COVID a few weeks ago.  In the ED she was found to be in Afib with RVR, given Cardizem and converted to NSR. She is was admitted for  further treatment.      10/19/23 Patient seent at bedside, denies any CP, palpitations, SOB, dizziness, lightheadedness.  TELE: SR 50s, 30s with sleep    MEDICATIONS  (STANDING):  apixaban 5 milliGRAM(s) Oral two times a day  aspirin enteric coated 81 milliGRAM(s) Oral daily  atorvastatin 20 milliGRAM(s) Oral at bedtime  famotidine    Tablet 20 milliGRAM(s) Oral two times a day  influenza  Vaccine (HIGH DOSE) 0.7 milliLiter(s) IntraMuscular once  metoprolol succinate ER 50 milliGRAM(s) Oral daily    MEDICATIONS  (PRN):  acetaminophen     Tablet .. 650 milliGRAM(s) Oral every 6 hours PRN Mild Pain (1 - 3)  metoprolol tartrate Injectable 5 milliGRAM(s) IV Push every 6 hours PRN HR > 130    Vital Signs Last 24 Hrs  T(C): 36.9 (19 Oct 2023 07:24), Max: 36.9 (19 Oct 2023 07:24)  T(F): 98.4 (19 Oct 2023 07:24), Max: 98.4 (19 Oct 2023 07:24)  HR: 54 (19 Oct 2023 07:24) (51 - 55)  BP: 163/60 (19 Oct 2023 07:24) (145/59 - 163/60)  RR: 18 (19 Oct 2023 07:24) (18 - 18)  SpO2: 98% (19 Oct 2023 07:24) (96% - 100%)    Parameters below as of 19 Oct 2023 07:24  Patient On (Oxygen Delivery Method): room air    General:  NAD  Respiratory: CTAB, normal respiratory effort, no wheezes, crackles, rales  CV: RRR, S1/S2, no murmurs, rubs or gallops  Abdominal: Soft, bowel sounds present, NT, ND +BS  Extremities: No edema, 2+ DP pulses  Neurological: A&Ox4, MAEx4, non-focal  Skin: No rashes    Labs:  10-19    143  |  110<H>  |  16  ----------------------------<  125<H>  3.9   |  29  |  0.65    Ca    8.9      19 Oct 2023 06:14    TPro  7.1  /  Alb  3.4  /  TBili  0.4  /  DBili  x   /  AST  11<L>  /  ALT  27  /  AlkPhos  110  10-17                        11.5   6.24  )-----------( 213      ( 19 Oct 2023 06:14 )             35.2     Cardiology:  Per outpatient records Echo 7/20237/2023: EF 60-65%, normal left atrial and right atrial size. Mild sclerosis of aortic valve. Trace mitral regurgitation. Mild tricuspid regurgitation.      Impression     Summary     Fibrocalcific changes noted to the mitral valve leaflets with preserved   leaflet excursion.   Mild (1+) mitral regurgitation is present.   Fibrocalcific changes noted to the Aortic valve leaflets with preserved   leaflet excursion.   Moderate (2+) tricuspid valve regurgitation is present.   Normal appearing pulmonic valve structure and function.   Normal appearing left atrium.   Minimal concentric left ventricular hypertrophy is present.   Estimated left ventricular ejection fraction is 60-65 %.   Normal appearing right atrium.   Normal appearing right ventricle structure and function.   No evidence of pericardial effusion.   No evidence of pleural effusion.     Signature     ----------------------------------------------------------------   Electronically signed by Joe Limon MD(Interpreting   physician) on 01/09/2019 01:17 PM    IMPRESSION: Normal SPECT Myocardial Perfusion Imaging.    No scan evidence of reversible or fixed perfusion defects.    Normal left ventricular contractility with an ejection fraction of 72%   (Normal: 50% or greater).    No regional wall motion abnormalities.    Please refer to cardiac stress test report for dosage of Regadenoson   administered, EKG findings and symptoms during the procedure.  GOMEZ HILL MD; Attending Nuclear Medicine  This document has been electronically signed. Jul 10 2023  1:06PM       
CHIEF COMPLAINT: Patient is a 78y old  Female who presents with a chief complaint of Palpitations (18 Oct 2023 04:33)    FROM H&P: 78 year old female with history of afib on eliquis, htn hld and current smoker presents with palpitations. Patient reports development of palpitations at dinner time, she denies any chest pain associated with it. No fever. She was recently diagnosed with COVID 3 days ago so she has been coughing a lot. She denies any OTC medications. In the ED she was found to be in Afib with RVR, given cardizem and converted to NSR. She is being admitted for further treatment.  (18 Oct 2023 04:33)    Cardiology consulted for afib  Patient reports she had COVID within last 2 weeks was "really sick" not hospitalized  last night went out to dinner drank coke and ate greasy foods which she thought  may have triggered this    10/19. Concerned about amiodarone s/e, no events on tele. stable    PAST MEDICAL & SURGICAL HISTORY:  HTN (hypertension)  HLD  Hip surgery    FAMILY HISTORY:  Father with heart disease-- denies afib hx.      Social History:    +TOB use.  5 cigs per day.  Ongoing for last 60 years.  since teenage years.    No ETOH use.      MEDICATIONS  (STANDING):  apixaban 5 milliGRAM(s) Oral two times a day  aspirin enteric coated 81 milliGRAM(s) Oral daily  atorvastatin 20 milliGRAM(s) Oral at bedtime  famotidine    Tablet 20 milliGRAM(s) Oral two times a day  influenza  Vaccine (HIGH DOSE) 0.7 milliLiter(s) IntraMuscular once  metoprolol succinate ER 50 milliGRAM(s) Oral daily    MEDICATIONS  (PRN):  acetaminophen     Tablet .. 650 milliGRAM(s) Oral every 6 hours PRN Mild Pain (1 - 3)  metoprolol tartrate Injectable 5 milliGRAM(s) IV Push every 6 hours PRN HR > 130    HOME MEDICATIONS:  aspirin 81 mg oral delayed release tablet: 1 tab(s) orally once a day (at bedtime) (17 Oct 2023 23:52)  calcipotriene 0.005% topical cream: Apply topically to affected area 2 times a day as needed for - (17 Oct 2023 23:52)  halobetasol 0.05% topical ointment: Apply topically to affected area 2 times a day as needed for - (17 Oct 2023 23:52)  metoprolol succinate 50 mg oral tablet, extended release: 1 tab(s) orally once a day (at bedtime) (17 Oct 2023 23:52)    PHYSICAL EXAM:  T(C): 36.9 (19 Oct 2023 07:24), Max: 36.9 (19 Oct 2023 07:24)  T(F): 98.4 (19 Oct 2023 07:24), Max: 98.4 (19 Oct 2023 07:24)  HR: 54 (19 Oct 2023 07:24) (51 - 55)  BP: 163/60 (19 Oct 2023 07:24) (145/59 - 163/60)  RR: 18 (19 Oct 2023 07:24) (18 - 18)  SpO2: 98% (19 Oct 2023 07:24) (96% - 100%)    Parameters below as of 19 Oct 2023 07:24  Patient On (Oxygen Delivery Method): room air    Constitutional: NAD, awake and alert  HEENT: PERR, EOMI, Normal Hearing, MMM  Neck: Soft and supple, No LAD, No JVD  Respiratory: Breath sounds are clear bilaterally, No wheezing, rales or rhonchi  Cardiovascular: S1 and S2, regular rate and rhythm, no Murmurs, gallops or rubs  Gastrointestinal: Bowel Sounds present, soft, nontender, nondistended, no guarding, no rebound  Extremities: No peripheral edema  Vascular: 2+ peripheral pulses  Neurological: A/O x 3, no focal deficits  Musculoskeletal: 5/5 strength b/l upper and lower extremities  Skin: No rashes    =======================================    INTERPRETATION OF TELEMETRY: SR - SB    ECG:  Afib RVR    ========================================    LABS:                        12.6   8.41  )-----------( 233      ( 17 Oct 2023 21:42 )             38.2     10-18    142  |  112<H>  |  19  ----------------------------<  131<H>  4.1   |  25  |  0.64    Ca    8.8      18 Oct 2023 08:32    TPro  7.1  /  Alb  3.4  /  TBili  0.4  /  DBili  x   /  AST  11<L>  /  ALT  27  /  AlkPhos  110  10-17    PT/INR - ( 17 Oct 2023 21:42 )   PT: 14.8 sec;   INR: 1.32 ratio       PTT - ( 17 Oct 2023 21:42 )  PTT:34.8 sec    Trop negative     TSH 5.49    RADIOLOGY & ADDITIONAL STUDIES:    10/17/23: Xray Chest 1 View: No acute finding or change. Chronic bilateral apical thickening.      
CHIEF COMPLAINT: Patient is a 78y old  Female who presents with a chief complaint of Palpitations (18 Oct 2023 04:33)    FROM H&P: 78 year old female with history of afib on eliquis, htn hld and current smoker presents with palpitations. Patient reports development of palpitations at dinner time, she denies any chest pain associated with it. No fever. She was recently diagnosed with COVID 3 days ago so she has been coughing a lot. She denies any OTC medications. In the ED she was found to be in Afib with RVR, given cardizem and converted to NSR. She is being admitted for further treatment.  (18 Oct 2023 04:33)    Cardiology consulted for afib  Patient reports she had COVID within last 2 weeks was "really sick" not hospitalized  last night went out to dinner drank coke and ate greasy foods which she thought  may have triggered this    10/19. Concerned about amiodarone s/e, no events on tele. stable  10/20. still indecisive about antiarrythmic therapy, d/w her at length her options.    PAST MEDICAL & SURGICAL HISTORY:  HTN (hypertension)  HLD  Hip surgery    FAMILY HISTORY:  Father with heart disease-- denies afib hx.      Social History:    +TOB use.  5 cigs per day.  Ongoing for last 60 years.  since teenage years.    No ETOH use.      MEDICATIONS:  apixaban 5 milliGRAM(s) Oral two times a day  aspirin enteric coated 81 milliGRAM(s) Oral daily  atorvastatin 20 milliGRAM(s) Oral at bedtime  famotidine    Tablet 20 milliGRAM(s) Oral two times a day  influenza  Vaccine (HIGH DOSE) 0.7 milliLiter(s) IntraMuscular once  metoprolol succinate ER 50 milliGRAM(s) Oral daily  potassium chloride    Tablet ER 40 milliEquivalent(s) Oral once    MEDICATIONS  (PRN):  acetaminophen     Tablet .. 650 milliGRAM(s) Oral every 6 hours PRN Mild Pain (1 - 3)  metoprolol tartrate Injectable 5 milliGRAM(s) IV Push every 6 hours PRN HR > 130      HOME MEDICATIONS:  aspirin 81 mg oral delayed release tablet: 1 tab(s) orally once a day (at bedtime) (17 Oct 2023 23:52)  calcipotriene 0.005% topical cream: Apply topically to affected area 2 times a day as needed for - (17 Oct 2023 23:52)  halobetasol 0.05% topical ointment: Apply topically to affected area 2 times a day as needed for - (17 Oct 2023 23:52)  metoprolol succinate 50 mg oral tablet, extended release: 1 tab(s) orally once a day (at bedtime) (17 Oct 2023 23:52)    PHYSICAL EXAM:  T(C): 37.1 (20 Oct 2023 07:40), Max: 37.1 (20 Oct 2023 07:40)  T(F): 98.7 (20 Oct 2023 07:40), Max: 98.7 (20 Oct 2023 07:40)  HR: 55 (20 Oct 2023 07:40) (52 - 55)  BP: 147/59 (20 Oct 2023 07:40) (146/64 - 147/59)  RR: 18 (20 Oct 2023 07:40) (18 - 18)  SpO2: 97% (20 Oct 2023 07:40) (97% - 99%)    Parameters below as of 20 Oct 2023 07:40  Patient On (Oxygen Delivery Method): room air    Constitutional: NAD, awake and alert  HEENT: PERR, EOMI, Normal Hearing, MMM  Neck: Soft and supple, No LAD, No JVD  Respiratory: Breath sounds are clear bilaterally, No wheezing, rales or rhonchi  Cardiovascular: S1 and S2, regular rate and rhythm, no Murmurs, gallops or rubs  Gastrointestinal: Bowel Sounds present, soft, nontender, nondistended, no guarding, no rebound  Extremities: No peripheral edema  Vascular: 2+ peripheral pulses  Neurological: A/O x 3, no focal deficits  Musculoskeletal: 5/5 strength b/l upper and lower extremities  Skin: No rashes    =======================================    INTERPRETATION OF TELEMETRY: SR - SB    ECG:  Afib RVR    ========================================    LABS:             LABS: All Labs Reviewed:                        11.2 5.92  )-----------( 188      ( 20 Oct 2023 05:56 )             34.0     10-20    144  |  114<H>  |  16  ----------------------------<  123<H>  3.9   |  26  |  0.60    Ca    8.8      20 Oct 2023 05:56  Mg     2.2     10-20    Troponin: 37.26 ng/L, Troponin: 31.77 ng/L     TSH 5.49    RADIOLOGY & ADDITIONAL STUDIES:    10/17/23: Xray Chest 1 View: No acute finding or change. Chronic bilateral apical thickening.

## 2023-10-20 NOTE — DISCHARGE NOTE PROVIDER - NSDCCPCAREPLAN_GEN_ALL_CORE_FT
PRINCIPAL DISCHARGE DIAGNOSIS  Diagnosis: Rapid atrial fibrillation  Assessment and Plan of Treatment: Your afib was too fast. Likely secondary to covid and smoking. Please try to obstain from caffeine, nicotine. Your heart rate also goes to low when you sleep likely a component of sleep apnea. Please fu with you PCP for further testing. Please continue medications as prescribed.      SECONDARY DISCHARGE DIAGNOSES  Diagnosis: Fatigue  Assessment and Plan of Treatment:

## 2023-10-20 NOTE — DISCHARGE NOTE PROVIDER - NSDCMRMEDTOKEN_GEN_ALL_CORE_FT
apixaban 5 mg oral tablet: 1 tab(s) orally 2 times a day  aspirin 81 mg oral delayed release tablet: 1 tab(s) orally once a day (at bedtime)  atorvastatin 20 mg oral tablet: 1 tab(s) orally once a day (at bedtime)  calcipotriene 0.005% topical cream: Apply topically to affected area 2 times a day as needed for -  halobetasol 0.05% topical ointment: Apply topically to affected area 2 times a day as needed for -  metoprolol succinate 25 mg oral tablet, extended release: 1 tab(s) orally once a day

## 2023-10-24 PROBLEM — I48.91 UNSPECIFIED ATRIAL FIBRILLATION: Chronic | Status: ACTIVE | Noted: 2023-10-17

## 2023-10-28 RX ORDER — AMOXICILLIN AND CLAVULANATE POTASSIUM 875; 125 MG/1; MG/1
875-125 TABLET, COATED ORAL
Qty: 20 | Refills: 0 | Status: DISCONTINUED | COMMUNITY
Start: 2023-08-14 | End: 2023-10-28

## 2023-10-30 ENCOUNTER — APPOINTMENT (OUTPATIENT)
Dept: FAMILY MEDICINE | Facility: CLINIC | Age: 78
End: 2023-10-30
Payer: MEDICARE

## 2023-10-30 VITALS
WEIGHT: 189 LBS | DIASTOLIC BLOOD PRESSURE: 76 MMHG | BODY MASS INDEX: 33.49 KG/M2 | SYSTOLIC BLOOD PRESSURE: 126 MMHG | OXYGEN SATURATION: 97 % | HEART RATE: 75 BPM | TEMPERATURE: 97.1 F | HEIGHT: 63 IN

## 2023-10-30 DIAGNOSIS — F17.200 NICOTINE DEPENDENCE, UNSPECIFIED, UNCOMPLICATED: ICD-10-CM

## 2023-10-30 DIAGNOSIS — Z82.49 FAMILY HISTORY OF ISCHEMIC HEART DISEASE AND OTHER DISEASES OF THE CIRCULATORY SYSTEM: ICD-10-CM

## 2023-10-30 DIAGNOSIS — Z81.8 FAMILY HISTORY OF OTHER MENTAL AND BEHAVIORAL DISORDERS: ICD-10-CM

## 2023-10-30 PROCEDURE — 99215 OFFICE O/P EST HI 40 MIN: CPT

## 2023-10-30 RX ORDER — DILTIAZEM HYDROCHLORIDE 120 MG/1
120 CAPSULE, EXTENDED RELEASE ORAL
Qty: 30 | Refills: 0 | Status: DISCONTINUED | COMMUNITY
Start: 2023-07-10

## 2023-10-30 RX ORDER — CINNAMON BARK 500 MG
CAPSULE ORAL
Refills: 0 | Status: ACTIVE | COMMUNITY

## 2023-10-30 RX ORDER — CHROMIUM 200 MCG
TABLET ORAL
Refills: 0 | Status: ACTIVE | COMMUNITY

## 2023-10-30 RX ORDER — DILTIAZEM HYDROCHLORIDE 120 MG/1
120 CAPSULE, EXTENDED RELEASE ORAL DAILY
Refills: 0 | Status: DISCONTINUED | COMMUNITY
End: 2023-10-30

## 2023-10-30 RX ORDER — DOFETILIDE 0.25 MG/1
250 CAPSULE ORAL
Qty: 90 | Refills: 0 | Status: DISCONTINUED | COMMUNITY
Start: 2023-10-19

## 2023-10-30 RX ORDER — MULTIVITAMIN
TABLET ORAL
Refills: 0 | Status: ACTIVE | COMMUNITY

## 2023-10-31 DIAGNOSIS — I10 ESSENTIAL (PRIMARY) HYPERTENSION: ICD-10-CM

## 2023-10-31 DIAGNOSIS — Z79.82 LONG TERM (CURRENT) USE OF ASPIRIN: ICD-10-CM

## 2023-10-31 DIAGNOSIS — M19.90 UNSPECIFIED OSTEOARTHRITIS, UNSPECIFIED SITE: ICD-10-CM

## 2023-10-31 DIAGNOSIS — Z86.16 PERSONAL HISTORY OF COVID-19: ICD-10-CM

## 2023-10-31 DIAGNOSIS — Z79.01 LONG TERM (CURRENT) USE OF ANTICOAGULANTS: ICD-10-CM

## 2023-10-31 DIAGNOSIS — F17.210 NICOTINE DEPENDENCE, CIGARETTES, UNCOMPLICATED: ICD-10-CM

## 2023-10-31 DIAGNOSIS — I25.10 ATHEROSCLEROTIC HEART DISEASE OF NATIVE CORONARY ARTERY WITHOUT ANGINA PECTORIS: ICD-10-CM

## 2023-10-31 DIAGNOSIS — I08.3 COMBINED RHEUMATIC DISORDERS OF MITRAL, AORTIC AND TRICUSPID VALVES: ICD-10-CM

## 2023-10-31 DIAGNOSIS — I48.0 PAROXYSMAL ATRIAL FIBRILLATION: ICD-10-CM

## 2023-10-31 DIAGNOSIS — U07.1 COVID-19: ICD-10-CM

## 2023-10-31 DIAGNOSIS — R53.81 OTHER MALAISE: ICD-10-CM

## 2023-11-08 ENCOUNTER — APPOINTMENT (OUTPATIENT)
Dept: MAMMOGRAPHY | Facility: CLINIC | Age: 78
End: 2023-11-08
Payer: MEDICARE

## 2023-11-08 ENCOUNTER — RESULT REVIEW (OUTPATIENT)
Age: 78
End: 2023-11-08

## 2023-11-08 ENCOUNTER — APPOINTMENT (OUTPATIENT)
Dept: FAMILY MEDICINE | Facility: CLINIC | Age: 78
End: 2023-11-08
Payer: MEDICARE

## 2023-11-08 ENCOUNTER — OUTPATIENT (OUTPATIENT)
Dept: OUTPATIENT SERVICES | Facility: HOSPITAL | Age: 78
LOS: 1 days | End: 2023-11-08
Payer: MEDICARE

## 2023-11-08 DIAGNOSIS — Z12.39 ENCOUNTER FOR OTHER SCREENING FOR MALIGNANT NEOPLASM OF BREAST: ICD-10-CM

## 2023-11-08 PROCEDURE — 77063 BREAST TOMOSYNTHESIS BI: CPT | Mod: 26

## 2023-11-08 PROCEDURE — 90686 IIV4 VACC NO PRSV 0.5 ML IM: CPT

## 2023-11-08 PROCEDURE — 77067 SCR MAMMO BI INCL CAD: CPT

## 2023-11-08 PROCEDURE — 77063 BREAST TOMOSYNTHESIS BI: CPT

## 2023-11-08 PROCEDURE — G0008: CPT

## 2023-11-08 PROCEDURE — 77067 SCR MAMMO BI INCL CAD: CPT | Mod: 26

## 2023-11-09 LAB
ALBUMIN SERPL ELPH-MCNC: 4.4 G/DL
ALP BLD-CCNC: 106 U/L
ALT SERPL-CCNC: 13 U/L
ANION GAP SERPL CALC-SCNC: 11 MMOL/L
AST SERPL-CCNC: 12 U/L
BASOPHILS # BLD AUTO: 0.05 K/UL
BASOPHILS NFR BLD AUTO: 0.7 %
BILIRUB SERPL-MCNC: 0.5 MG/DL
BUN SERPL-MCNC: 16 MG/DL
CALCIUM SERPL-MCNC: 9.3 MG/DL
CHLORIDE SERPL-SCNC: 104 MMOL/L
CHOLEST SERPL-MCNC: 105 MG/DL
CO2 SERPL-SCNC: 26 MMOL/L
CREAT SERPL-MCNC: 0.66 MG/DL
CREAT SPEC-SCNC: 115 MG/DL
EGFR: 89 ML/MIN/1.73M2
EOSINOPHIL # BLD AUTO: 0.07 K/UL
EOSINOPHIL NFR BLD AUTO: 1 %
ESTIMATED AVERAGE GLUCOSE: 143 MG/DL
GLUCOSE SERPL-MCNC: 125 MG/DL
HBA1C MFR BLD HPLC: 6.6 %
HCT VFR BLD CALC: 37.7 %
HDLC SERPL-MCNC: 43 MG/DL
HGB BLD-MCNC: 12.2 G/DL
IMM GRANULOCYTES NFR BLD AUTO: 0.3 %
LDLC SERPL CALC-MCNC: 45 MG/DL
LYMPHOCYTES # BLD AUTO: 2.06 K/UL
LYMPHOCYTES NFR BLD AUTO: 28.8 %
MAN DIFF?: NORMAL
MCHC RBC-ENTMCNC: 29.3 PG
MCHC RBC-ENTMCNC: 32.4 GM/DL
MCV RBC AUTO: 90.4 FL
MICROALBUMIN 24H UR DL<=1MG/L-MCNC: 1.5 MG/DL
MICROALBUMIN/CREAT 24H UR-RTO: 13 MG/G
MONOCYTES # BLD AUTO: 0.48 K/UL
MONOCYTES NFR BLD AUTO: 6.7 %
NEUTROPHILS # BLD AUTO: 4.48 K/UL
NEUTROPHILS NFR BLD AUTO: 62.5 %
NONHDLC SERPL-MCNC: 62 MG/DL
PLATELET # BLD AUTO: 204 K/UL
POTASSIUM SERPL-SCNC: 4.7 MMOL/L
PROT SERPL-MCNC: 6.9 G/DL
RBC # BLD: 4.17 M/UL
RBC # FLD: 14.5 %
SODIUM SERPL-SCNC: 141 MMOL/L
TRIGL SERPL-MCNC: 90 MG/DL
TSH SERPL-ACNC: 3.59 UIU/ML
WBC # FLD AUTO: 7.16 K/UL

## 2024-01-05 ENCOUNTER — RX RENEWAL (OUTPATIENT)
Age: 79
End: 2024-01-05

## 2024-01-05 RX ORDER — ATORVASTATIN CALCIUM 20 MG/1
20 TABLET, FILM COATED ORAL
Qty: 90 | Refills: 3 | Status: ACTIVE | COMMUNITY
Start: 2021-06-18 | End: 1900-01-01

## 2024-02-06 ENCOUNTER — APPOINTMENT (OUTPATIENT)
Dept: OBGYN | Facility: CLINIC | Age: 79
End: 2024-02-06
Payer: MEDICARE

## 2024-02-06 VITALS
SYSTOLIC BLOOD PRESSURE: 138 MMHG | HEIGHT: 63 IN | WEIGHT: 194 LBS | BODY MASS INDEX: 34.38 KG/M2 | DIASTOLIC BLOOD PRESSURE: 80 MMHG

## 2024-02-06 DIAGNOSIS — Z12.39 ENCOUNTER FOR OTHER SCREENING FOR MALIGNANT NEOPLASM OF BREAST: ICD-10-CM

## 2024-02-06 PROCEDURE — 99397 PER PM REEVAL EST PAT 65+ YR: CPT | Mod: GY

## 2024-02-06 PROCEDURE — G0101: CPT

## 2024-02-06 PROCEDURE — 82270 OCCULT BLOOD FECES: CPT

## 2024-02-06 NOTE — HISTORY OF PRESENT ILLNESS
[FreeTextEntry1] : 79 yo  with LMP age 46  No PMB, No HRT, denies dyspnea Sad since brother showing signs of dementia and sister has CAD  Gyn:  2013 Right stereotactic biopsy-benign   OB:  x 5 - Wt 5-8#  Med: A fib - Eliquis HLD atorvastatin HTN - metoprolol Psoriasis   FHx:  Sister: Cardiac Angioplasty Sister:  Cardiac Angioplasty Brother - some memory loss  SH: Has a male significant other (he lives in [place]) Former smoker, not sexually active.    [Mammogramdate] : 2023 [TextBox_19] : BIRADs 2 [PapSmeardate] : 2017 [TextBox_31] : NL  [BoneDensityDate] : 2020 [TextBox_37] : NL  [ColonoscopyDate] : never

## 2024-02-06 NOTE — DISCUSSION/SUMMARY
[FreeTextEntry1] : Health Maintenance:  -Mammo Rx -TBSE -colonoscopy guidelines reviewed with patient- importance of colon cancer screening discussed -Achieve Vit D levels of 30-40, intake of 1100 mg daily calcium mostly thru dark green leafy greens and milk products, exercise 30 minutes TIW

## 2024-02-06 NOTE — PHYSICAL EXAM
Pt with dual lumen CVC. RN unable to flush red lumen. MD Guerrier made aware. No orders at that time due to pt having low platelet count. Purple lumen flushed, but no blood return. Will monitor.   [Chaperone Present] : A chaperone was present in the examining room during all aspects of the physical examination [Appropriately responsive] : appropriately responsive [Alert] : alert [No Acute Distress] : no acute distress [No Lymphadenopathy] : no lymphadenopathy [Soft] : soft [Non-tender] : non-tender [Oriented x3] : oriented x3 [Examination Of The Breasts] : a normal appearance [No Masses] : no breast masses were palpable [Vulvar Atrophy] : vulvar atrophy [Labia Majora] : normal [Labia Minora] : normal [Atrophy] : atrophy [Normal] : normal [Uterine Adnexae] : normal [No Tenderness] : no tenderness [Nl Sphincter Tone] : normal sphincter tone [FreeTextEntry2] : 10x5mm triangular psoriatic plaque noted on right scapula Psoriasis noted on b/l elbows [FreeTextEntry6] : Under right breast: erythematous candida area [FreeTextEntry9] : stool guaiac neg

## 2024-02-22 ENCOUNTER — RX RENEWAL (OUTPATIENT)
Age: 79
End: 2024-02-22

## 2024-02-22 RX ORDER — METOPROLOL SUCCINATE 50 MG/1
50 TABLET, EXTENDED RELEASE ORAL
Qty: 90 | Refills: 3 | Status: ACTIVE | COMMUNITY
Start: 2021-06-11 | End: 1900-01-01

## 2024-02-26 ENCOUNTER — APPOINTMENT (OUTPATIENT)
Dept: FAMILY MEDICINE | Facility: CLINIC | Age: 79
End: 2024-02-26
Payer: MEDICARE

## 2024-02-26 VITALS
BODY MASS INDEX: 34.38 KG/M2 | TEMPERATURE: 97.6 F | DIASTOLIC BLOOD PRESSURE: 70 MMHG | OXYGEN SATURATION: 96 % | WEIGHT: 194 LBS | HEIGHT: 63 IN | HEART RATE: 86 BPM | SYSTOLIC BLOOD PRESSURE: 120 MMHG

## 2024-02-26 DIAGNOSIS — Z72.0 TOBACCO USE: ICD-10-CM

## 2024-02-26 PROCEDURE — 36415 COLL VENOUS BLD VENIPUNCTURE: CPT

## 2024-02-26 PROCEDURE — 99215 OFFICE O/P EST HI 40 MIN: CPT

## 2024-02-26 RX ORDER — ASPIRIN ENTERIC COATED TABLETS 81 MG 81 MG/1
81 TABLET, DELAYED RELEASE ORAL
Refills: 0 | Status: DISCONTINUED | COMMUNITY
End: 2024-02-26

## 2024-02-26 NOTE — REVIEW OF SYSTEMS
[Cough] : cough [Joint Pain] : joint pain [Joint Stiffness] : joint stiffness [Negative] : Neurological [Chest Pain] : no chest pain [Palpitations] : palpitations [Lower Ext Edema] : no lower extremity edema [Paroxysmal Nocturnal Dyspnea] : no paroxysmal nocturnal dyspnea [Orthopnea] : no orthopnea [Shortness Of Breath] : no shortness of breath [Wheezing] : no wheezing [Dyspnea on Exertion] : no dyspnea on exertion [Muscle Pain] : no muscle pain [Suicidal] : not suicidal [Insomnia] : no insomnia [Anxiety] : anxiety [FreeTextEntry5] : +Afib with RVR overnight and this AM but in past hour back to normal rate/rhythm [FreeTextEntry6] : intermittent cough on and off for years, due in part to allergies/post nasal drip also related to long term tobacco use, seeing Dr. Rubio for eval  [FreeTextEntry9] : stiff achy joints due to OA [de-identified] : anxiety about her Afib and worrying will have an Afib with RVR episode again

## 2024-02-26 NOTE — PHYSICAL EXAM
[No Acute Distress] : no acute distress [Well-Appearing] : well-appearing [Well Developed] : well developed [EOMI] : extraocular movements intact [Normal Sclera/Conjunctiva] : normal sclera/conjunctiva [Normal Outer Ear/Nose] : the outer ears and nose were normal in appearance [Normal Oropharynx] : the oropharynx was normal [No JVD] : no jugular venous distention [Thyroid Normal, No Nodules] : the thyroid was normal and there were no nodules present [Supple] : supple [No Lymphadenopathy] : no lymphadenopathy [No Accessory Muscle Use] : no accessory muscle use [No Respiratory Distress] : no respiratory distress  [Clear to Auscultation] : lungs were clear to auscultation bilaterally [Normal Rate] : normal rate  [Normal S1, S2] : normal S1 and S2 [No Murmur] : no murmur heard [No Carotid Bruits] : no carotid bruits [Pedal Pulses Present] : the pedal pulses are present [No Edema] : there was no peripheral edema [No Extremity Clubbing/Cyanosis] : no extremity clubbing/cyanosis [Non Tender] : non-tender [Soft] : abdomen soft [Non-distended] : non-distended [Normal Bowel Sounds] : normal bowel sounds [Normal Posterior Cervical Nodes] : no posterior cervical lymphadenopathy [Normal Anterior Cervical Nodes] : no anterior cervical lymphadenopathy [No Joint Swelling] : no joint swelling [Grossly Normal Strength/Tone] : grossly normal strength/tone [No Rash] : no rash [Coordination Grossly Intact] : coordination grossly intact [Normal Gait] : normal gait [Normal Affect] : the affect was normal [Normal Insight/Judgement] : insight and judgment were intact [de-identified] : BMI 34 obese, but wgt stable from prior visit , tired today as was up in ED all night, nntoxic appearance [de-identified] : she is in NSR on exam at 10:55 AM   [de-identified] : lungs CTA B with regular breathing and with cough today , no w/c/r and fairly good AE

## 2024-02-26 NOTE — ASSESSMENT
[FreeTextEntry1] : KEYLA KEMP is a 78 year old female here for follow up on medical issues as noted above.  Had ED visit to [facility] overnight for Afib w/ RVR and was given dose of Diltiazem she believes and Rx for Diltiazem  mg po daily (I can see the Rx order in the ED records she brought but those records do not include lab results or indication of what med was given in ED).  She is heading to Dr. Apple's office to sched an ED f/u. Revd her Afib related meds and reasons for each of them and I recommended she take Metop, Diltiazem and Eliquis consistently. She states thAT Dr. Apple told her could stop ASA but card PA said she should cont. She stopped it but is questioning whether should restart. She will clarify that with Dr. Apple.

## 2024-02-26 NOTE — PLAN
[FreeTextEntry1] : Continue all medications as prescribed. Check labs today (defer on CBC and CMP as she just at in ED overnight aalong with other labs and we will try to get results . Will adjust meds as needed based on results.  Reminded her again that she is due for colonoscopy to evaluate her positive Cologuard test (from 2022) and we again revd the importance of following through with this. She had consultation with GI Dr. Gallo at [place] 5/2023 and was supposed to have colonoscopy but this has been postponed due to intervening cardiac issues. I advised her that she should update GI re her new meds (amanda Eliquis) and should d/w card teams re if needs to hold the Eliquis prior to colonoscopy (likely yes) and if so when/for how long she can hold Eliquis prior to colonoscopy, and when it is ok to proceed with colonoscopy. This test has already been delayed several times due to other circumstances so I would like her to try to complete this at some point not too far in the future  Recommended that she consider having sleep study done. She has appt with Dr. Rubio today and will d/w him to get his input/advice and I defer to his judgment  BP goal is <=135/85 on average on home checks. Advised to let us know if BPs are above goal on home checks.  Lifestyle measures to optimize BP reviewed, including regular exercise, adequate sleep, Mediterranean or DASH style clean eating, mindfulness/meditation, magnesium 100-400 mg supplementation, avoid NSAIDS, stress management techniques etc.  Hydrate very well (64+ oz water per day), limit/avoid cardiac irritants (eg caffeine, alcohol, oral decongestants etc), stress reduction measures, consider magnesium supplementation (100-400 mg daily) to decrease palpitations/risk for Afib with RVR.   Recommended she consider having LINQ recorder placed as rec by card team and revd pros/cons/potential info this could give us to help fine tune her meds etc. She will consider though is still seemingly leaning against doing this.   LDL goal <=100 ideally. Reviewed risks/benefits of statin med. Recommended excellent hydration +/- co Q10 (100-400 mg daily) to decrease risk for statin related myalgias.  Reviewed diabetes treatment plan including Mediterranean style/more plant based/clean eating, pair proteins with carbs for better glycemic control, regular exercise (strength training as well as cardio exercise, and try to do brisk walking after larger meals for better glycemic control), work to achieve/maintain a health weight, need for annual ophthalmology exam and good foot care (self care or with podiatrist if needed), Goal LDL <100, goal A1c <=7%, BP goal <=130-135/80-85 on average. Most patients with diabetes should be on an ACE-I or ARB med for renal protection, and a statin medication for primary CV risk reduction.  Reviewed importance of good self care (eg meditation, yoga, adequate rest, regular exercise, magnesium, clean eating etc).  Continue follow up with her specialists as recommended by them. She will have Dr. Ware send me info  F/u as scheduled 4/2024 for CPE/RPA visit and rpt fasting labs at that time  Face-to-face time spent with patient, over half in discussion of the above diagnoses and treatment plan: 40 minutes.

## 2024-02-26 NOTE — HEALTH RISK ASSESSMENT
[No falls in past year] : Patient reported no falls in the past year [0] : 1) Little interest or pleasure doing things: Not at all (0) [PHQ-2 Negative - No further assessment needed] : PHQ-2 Negative - No further assessment needed [20 or more] : 20 or more [Former] : Former [< 15 Years] : < 15 Years [[id number]] : 0

## 2024-02-26 NOTE — HISTORY OF PRESENT ILLNESS
[FreeTextEntry1] : KEYLA KEMP is a 78 year old female here for a follow up visit. [de-identified] : Santa has hypertension, high cholesterol, hypothyroidism, paroxysmal Atrial fibrillation, Type 2 DM, and she is an active smoker with pulm nodule (stable on several Chest CT so far and being monitored/followed up by Dr. Rubio).  UTD on f/u with her specialists Dr. Apple (card) and has also sen Dr. Ware at [facility] in past year, Dr. Rubio (pulm), Dr. Gallo (GI)  Also in 7/2022 her Cologuard CRC screen was positive and she was referred for Colonoscopy. She saw GI Dr. Gallo at [place] and was scheduled/recommended she have a colonoscopy. She has not yet completed this due to intervening cardiac issues but has this scheduled for 6/2024.   She quit smoking on her [birth year]! At her last visit on 10/30/23 we disc that hosp team (during recent admission for Afib/RVR) had recommended she consider doing sleep study and she had said she would d/w Dr. Rubio. She did not yet do so but has an appt with him today and will discuss. She is UTD On lung cancer screening/pulm nodule f/u with Dr. Rubio.   She had card f/u and addtl testing after her Summer 2023 hospitalization for Afib with RVR. Echo with Dr. Juan Apple 7/2023 shows nl left and right ventricular function, LVEF 60-65%, Tr MR, mild TR, stable from prior. Zio patch monitoring x 1 wk showed no Afib episodes but multiple episodes of PSVT (asymptomatic during these episodes). She also states she saw Dr. Ware EP sushil at [facility] in Summer 2024 and LINQ recorder was recommended but she opted to defer on having this placed. Dr. Apple also recommended this for her at recent 2/2024 visit and d/w her. We are discussing as well today and I concur with her card specialists and feel if she had LINQ monitor in place we could get more information re if she is having any episodes of Afib/other cardiac rhythm issues. She had declined trial of Tikosyn recommended in hosp 7/2023 but might perhaps consider that more strongly if LINQ recorder results showed concerning freq/types of cardiac rhythms. She will consider what we have discussed and f/u with her card team if willing to do LINQ recorder.    She denies CP, SOB, dizziness, syncope episodes. No falls and no excessive bleeding. Taking meds consistently and has remained tobacco free and is trying to eat more cleanly and be a little more active though is not exercising consistently. Does not drink ETOH. Limits caffeine intake. Trying to drink a good amount of water daily.   Had to go to ED last night at [facility] due to rapid irregular HR and shoulder fluttering sensation. Was there all night and released at 4:30 AM. HR still felt irregular but not rapid upon ED d/c but has now settled in past hour an dthinks is back to normal rhythm. Was Rx'ed diltiazem  mg and she did not yet . Believes she was given a dose of this med via in the ED overnight . Says dose of Metoprolol was decreased from ?50 to 25 mg a while ago but is not sure why and then she states that maybe she is still on 50 mg-- she is not sure and will check her meds when gets home and clarify for us/call Dr. Apple if not sure she is taking as was Rx'ed. She wants to know if really needs to take the Metoprolol and the Diltiazem and we revd that Dilt was added since she had breakthrough Afib with RVR on Metop alone so I recommend she take both for now and can d/w Dr. Apple to get his input re long term plan  We also revd that she needs to keep an accurate list of her meds/doses to bring to medical visits and should use pill organizer to ensure she is taking meds consistently   She does not drink alcohol, is not smoking, has only 1 cupcoffee daily, tryies to hydrate. Doing a little walking but i still too nervous t exercise as as soon as HR starts to rise she gets nervous will have Afib. We revd heart is a muscle and needs to be exercised and I suggested she start with low levels of exercise (mod pace walk on level surface for a few min) and gradually increase duration/intensity of exercise over time as tolerated. Also if she has INQ recorder in place she can feel more confidence exercising as knows will be monitored so I rcommend she follow through with card rec to do LINQ

## 2024-02-27 LAB
CHOLEST SERPL-MCNC: 117 MG/DL
CREAT SPEC-SCNC: 248 MG/DL
ESTIMATED AVERAGE GLUCOSE: 140 MG/DL
HBA1C MFR BLD HPLC: 6.5 %
HDLC SERPL-MCNC: 44 MG/DL
LDLC SERPL CALC-MCNC: 51 MG/DL
MICROALBUMIN 24H UR DL<=1MG/L-MCNC: 13.4 MG/DL
MICROALBUMIN/CREAT 24H UR-RTO: 54 MG/G
NONHDLC SERPL-MCNC: 73 MG/DL
TRIGL SERPL-MCNC: 129 MG/DL
TSH SERPL-ACNC: 3.67 UIU/ML

## 2024-02-27 RX ORDER — METOPROLOL SUCCINATE 25 MG/1
25 TABLET, EXTENDED RELEASE ORAL
Qty: 90 | Refills: 0 | Status: DISCONTINUED | COMMUNITY
Start: 2023-07-13 | End: 2024-02-27

## 2024-03-06 ENCOUNTER — APPOINTMENT (OUTPATIENT)
Dept: INTERNAL MEDICINE | Facility: CLINIC | Age: 79
End: 2024-03-06
Payer: MEDICARE

## 2024-03-06 ENCOUNTER — NON-APPOINTMENT (OUTPATIENT)
Age: 79
End: 2024-03-06

## 2024-03-06 VITALS
BODY MASS INDEX: 34.02 KG/M2 | HEART RATE: 56 BPM | SYSTOLIC BLOOD PRESSURE: 130 MMHG | TEMPERATURE: 98.3 F | RESPIRATION RATE: 18 BRPM | WEIGHT: 192 LBS | DIASTOLIC BLOOD PRESSURE: 60 MMHG | OXYGEN SATURATION: 97 % | HEIGHT: 63 IN

## 2024-03-06 PROCEDURE — 94010 BREATHING CAPACITY TEST: CPT

## 2024-03-06 PROCEDURE — 99214 OFFICE O/P EST MOD 30 MIN: CPT | Mod: 25

## 2024-03-06 PROCEDURE — G0447 BEHAVIOR COUNSEL OBESITY 15M: CPT | Mod: 59

## 2024-03-06 NOTE — PROCEDURE
[FreeTextEntry1] : Spirometry today shows a mild restrictive deficit with an FVC of 1.64 or 66% predicted.

## 2024-03-06 NOTE — ASSESSMENT
[FreeTextEntry1] : #1  Former cigarette smoker for over 20 pack years, quit 5 months ago.  Patient will have her next following CT scan of chest 1 year after her prior, or in May 2024.  She will return for following pulmonary appointment afterwards to review the results in the office.  #2  Evaluate for obstructive sleep apnea.  I am ordering for Keyla to have a overnight polysomnography study at home.  KEYLA will continue strict weight reduction efforts, sleep on the sides, and avoid alcohol and sedating medicines. The patient knows fully not to drive or work with machinery if having any sleepiness or tiredness.   #3 HTN, AF.  Continue current medical regimen.

## 2024-03-06 NOTE — HISTORY OF PRESENT ILLNESS
[TextBox_4] : This is a following pulmonary appointment for this 78-year-old female.  She smoked cigarettes for over 20 pack years and quit 5 months ago.  Her last CT scan of chest on May 30, 2023 showed a 3 mm nodule in the right lower lobe, stable.  She will be having her next following CT scan of chest after 1 year or in May 2024.  Patient is not having recent coughing, wheezing, dyspnea on exertion, or sputum production.  She is not having recent fever.  The patient does have a history of significant snoring as well as excessive daytime somnolence.  He will take naps.  He does not awaken with morning headache.  She in general awakens in the mornings feeling refreshed.  She has not had a prior overnight polysomnography study.  I am ordering for Ellie to have an overnight home polysomnography study.  Since her last visit, the patient was diagnosed as having atrial fibrillation and is maintained on Eliquis treatment.

## 2024-03-26 ENCOUNTER — RX RENEWAL (OUTPATIENT)
Age: 79
End: 2024-03-26

## 2024-04-03 ENCOUNTER — RX RENEWAL (OUTPATIENT)
Age: 79
End: 2024-04-03

## 2024-04-05 RX ORDER — DILTIAZEM HYDROCHLORIDE 120 MG/1
120 TABLET, EXTENDED RELEASE ORAL
Qty: 90 | Refills: 1 | Status: ACTIVE | COMMUNITY
Start: 1900-01-01 | End: 1900-01-01

## 2024-04-16 ENCOUNTER — APPOINTMENT (OUTPATIENT)
Dept: FAMILY MEDICINE | Facility: CLINIC | Age: 79
End: 2024-04-16
Payer: MEDICARE

## 2024-04-16 VITALS
DIASTOLIC BLOOD PRESSURE: 70 MMHG | HEIGHT: 63 IN | TEMPERATURE: 97.2 F | HEART RATE: 67 BPM | WEIGHT: 192 LBS | BODY MASS INDEX: 34.02 KG/M2 | OXYGEN SATURATION: 96 % | SYSTOLIC BLOOD PRESSURE: 140 MMHG

## 2024-04-16 VITALS — SYSTOLIC BLOOD PRESSURE: 126 MMHG | DIASTOLIC BLOOD PRESSURE: 60 MMHG

## 2024-04-16 DIAGNOSIS — E78.00 PURE HYPERCHOLESTEROLEMIA, UNSPECIFIED: ICD-10-CM

## 2024-04-16 DIAGNOSIS — G47.33 OBSTRUCTIVE SLEEP APNEA (ADULT) (PEDIATRIC): ICD-10-CM

## 2024-04-16 DIAGNOSIS — E03.9 HYPOTHYROIDISM, UNSPECIFIED: ICD-10-CM

## 2024-04-16 DIAGNOSIS — Z00.00 ENCOUNTER FOR GENERAL ADULT MEDICAL EXAMINATION W/OUT ABNORMAL FINDINGS: ICD-10-CM

## 2024-04-16 DIAGNOSIS — I10 ESSENTIAL (PRIMARY) HYPERTENSION: ICD-10-CM

## 2024-04-16 DIAGNOSIS — M15.9 POLYOSTEOARTHRITIS, UNSPECIFIED: ICD-10-CM

## 2024-04-16 DIAGNOSIS — E11.9 TYPE 2 DIABETES MELLITUS W/OUT COMPLICATIONS: ICD-10-CM

## 2024-04-16 DIAGNOSIS — I48.0 PAROXYSMAL ATRIAL FIBRILLATION: ICD-10-CM

## 2024-04-16 PROCEDURE — G0439: CPT

## 2024-04-16 PROCEDURE — 36415 COLL VENOUS BLD VENIPUNCTURE: CPT

## 2024-04-16 PROCEDURE — 99213 OFFICE O/P EST LOW 20 MIN: CPT | Mod: 25

## 2024-04-16 RX ORDER — AMLODIPINE BESYLATE 5 MG/1
5 TABLET ORAL
Qty: 90 | Refills: 3 | Status: DISCONTINUED | COMMUNITY
Start: 2022-03-26 | End: 2024-04-16

## 2024-04-16 NOTE — REVIEW OF SYSTEMS
[Cough] : cough [Joint Pain] : joint pain [Joint Stiffness] : joint stiffness [Shortness Of Breath] : no shortness of breath [Wheezing] : no wheezing [Dyspnea on Exertion] : no dyspnea on exertion [Muscle Pain] : no muscle pain [Negative] : Cardiovascular [FreeTextEntry5] : no recent Afib or palps she states  [FreeTextEntry6] : intermittent cough on and off for years, due in part to allergies/post nasal drip also related to long term tobacco use, seeing Dr. Rubio for eval  [FreeTextEntry9] : stiff achy joints due to OA

## 2024-04-16 NOTE — ASSESSMENT
[FreeTextEntry1] : KEYLA KEMP is a 78 year old female here for a physical exam.  She is also here to follow up on medical issues as noted above.

## 2024-04-16 NOTE — HEALTH RISK ASSESSMENT
[No falls in past year] : Patient reported no falls in the past year [PHQ-2 Negative - No further assessment needed] : PHQ-2 Negative - No further assessment needed [0] : 2) Feeling down, depressed, or hopeless: Not at all (0) [Former] : Former [20 or more] : 20 or more [< 15 Years] : < 15 Years [[id number]] : 0

## 2024-04-16 NOTE — HISTORY OF PRESENT ILLNESS
[FreeTextEntry1] : KEYLA KEMP is a 78 year old female here for a physical exam. [de-identified] : Her last physical exam was last year  Vaccines: Tetanus is up to date, Tdap 12/2015 Pneumococcal vaccination is up to date, though can consider dose of PCV 20 in 2025 (5 yrs after last pneumococcal vaccine dose) Shingrix is up to date   Her last dentist visit was less than one year ago Her last eye doctor appointment was less than one year ago, Dr. Millan Her last dermatologist visit was less than one year ago, Dr. Kent  GYN visit is up to date, 2/2024 Dr. Warren  Mammogram is up to date, 11/2023 stable/benign mammo Colon cancer screening is NOT up to date, 7/2022 her Cologuard CRC screen was positive and she was referred for Colonoscopy. She saw GI Dr. Gallo at [place] and was scheduled/recommended she have a colonoscopy. She has not yet completed this due to intervening cardiac issues but has this scheduled for 6/2024. DEXA is up to date, 7/30/2020 wnl (since was wnl can defer up to 5 yrs to repeat or as otherwise recommended by gyn)   Her diet is healthy overall Exercise: recently started a walking routine 10 min 3x/day, using walker for support  Santa has hypertension, high cholesterol, hypothyroidism, paroxysmal Atrial fibrillation, Type 2 DM, and she is an active smoker with pulm nodule (stable on several Chest CT so far and being monitored/followed up by Dr. Rubio).  UTD on f/u with her specialists Dr. Apple (card) and has also seen Dr. Ware at [facility] in past year (though she does not fee needs to f/u with him at this time), Dr. Rubio (pulm), Dr. Gallo (GI)  Also in 7/2022 her Cologuard CRC screen was positive and she was referred for Colonoscopy. She saw GI Dr. Gallo at [place] and was scheduled/recommended she have a colonoscopy. She has not yet completed this due to intervening cardiac issues but has this scheduled for 6/2024. We revd she should d/w card team re whether and how long can hold her Eliquis prior to the endoscopy; she did not yet do so and I wrot this down for her and she says she will d/w card prior to colonoscopy date  See prior notes for addl info. She is UTD on f/u with Dr. Juan Apple and his team. Has not seen Dr. Ware ([facility]) since 2023 sometime but agrees will schedule for near future. LINQ recorder placement hs been recommended by Dr. Apple, Dr. Ware and during her several ED/hospitalizations for Afib with RVR. She has been reluctant to consider. I concur with her card specialists and feel if she had LINQ monitor in place we could get more information re if she is having any episodes of Afib/other cardiac rhythm issues. She says she is opting to defer on implantable loop recorder type of device for now  We also revd that she needs to keep an accurate list of her meds/doses to bring to medical visits and should use pill organizer to ensure she is taking meds consistently. She brought in a written med list today and most of the meds listed have doses listed  She does not drink alcohol, is not smoking, has only 1 cup coffee daily, tries to hydrate. Started doing some walking last week and will try to incr. We revd heart is a muscle and needs to be exercised and I suggested she start with low levels of exercise (mod pace walk on level surface for a few min) and gradually increase duration/intensity of exercise over time as tolerated.  At her 2/2024 visit A1C 6.5 (6.6), , , HDL 44, LDL 51, Urine microalbumin level elevated so should add an ACE/ARB med if still elevated again today (deferred on adding in 2/2024 as just had Diltiazem added at that time for Afib w/ RVR control). Card team incr her metoprolol ER to 50 mg (had been on 25 mg) in 2/2024. She also updates me today that she recently was started on Enalapril by card team   She saw Dr. Rubio for f/u 3/2024 and discussed her heavy snoring and excessive daytime fatigue and he ordered a home sleep study. She just got kit mailed to her and will be doing home sleep study soon    Her card team add enalapril 5 mg daily recently and requested BMP with labs today . She is off amlodipine at this point she states   She is considering moving to [place] to help her sister care for her younger brother who has dementia in early 70s

## 2024-04-16 NOTE — PHYSICAL EXAM
[No Acute Distress] : no acute distress [Well Developed] : well developed [Well-Appearing] : well-appearing [Normal Sclera/Conjunctiva] : normal sclera/conjunctiva [EOMI] : extraocular movements intact [Normal Outer Ear/Nose] : the outer ears and nose were normal in appearance [Normal Oropharynx] : the oropharynx was normal [No JVD] : no jugular venous distention [No Lymphadenopathy] : no lymphadenopathy [Supple] : supple [Thyroid Normal, No Nodules] : the thyroid was normal and there were no nodules present [No Respiratory Distress] : no respiratory distress  [No Accessory Muscle Use] : no accessory muscle use [Clear to Auscultation] : lungs were clear to auscultation bilaterally [Normal Rate] : normal rate  [Normal S1, S2] : normal S1 and S2 [No Murmur] : no murmur heard [No Carotid Bruits] : no carotid bruits [Pedal Pulses Present] : the pedal pulses are present [No Edema] : there was no peripheral edema [No Extremity Clubbing/Cyanosis] : no extremity clubbing/cyanosis [Soft] : abdomen soft [Non Tender] : non-tender [Non-distended] : non-distended [Normal Bowel Sounds] : normal bowel sounds [Normal Posterior Cervical Nodes] : no posterior cervical lymphadenopathy [Normal Anterior Cervical Nodes] : no anterior cervical lymphadenopathy [No Joint Swelling] : no joint swelling [Grossly Normal Strength/Tone] : grossly normal strength/tone [No Rash] : no rash [Coordination Grossly Intact] : coordination grossly intact [Normal Gait] : normal gait [Normal Affect] : the affect was normal [Normal Insight/Judgement] : insight and judgment were intact [de-identified] : BMI 34 obese, but wgt stable from prior visit  [de-identified] : lungs CTA B with regular breathing and with cough today , no w/c/r and fairly good AE [de-identified] : in NSR today at visit

## 2024-04-16 NOTE — PLAN
[FreeTextEntry1] : Continue all medications as prescribed. Check labs as above; will cc labs to [facility] team. Will adjust any medications based upon lab results.  Reviewed age-appropriate preventive screening tests with patient. UTD on gyn exam and mammogram and DEXA screening. Due for colonoscopy (f/u positive Cologuard test from 2023) and she is scheduled for 6/2024  Initial BP was elevated though repeat BP much improved. BP goal is <=135/85 on average on home checks. Advised to let us know if BPs are above goal on home checks.   Lifestyle measures to optimize BP reviewed, including regular exercise, adequate sleep, Mediterranean or DASH style clean eating, mindfulness/meditation, magnesium 100-400 mg supplementation, avoid NSAIDS, stress management techniques etc.  LDL goal <=100 ideally. Reviewed risks/benefits of statin med. Recommended excellent hydration +/- co Q10 (100-400 mg daily) to decrease risk for statin related myalgias.   She has home sleep study scheduled for near future with Dr. Rubio   Reviewed diabetes treatment plan including Mediterranean style/more plant based/clean eating, pair proteins with carbs for better glycemic control, regular exercise (strength training as well as cardio exercise, and try to do brisk walking after larger meals for better glycemic control), work to achieve/maintain a health weight, need for annual ophthalmology exam and good foot care (self care or with podiatrist if needed), Goal LDL <100, goal A1c <=7%, BP goal <=130-135/80-85 on average. Most patients with diabetes should be on an ACE-I or ARB med for renal protection, and a statin medication for primary CV risk reduction. She is now on ACE-I med and we revd that this offers renal protection and is good for her to be on due to her DM and not just benefitting her BP  Hydrate very well (64+ oz water per day), limit/avoid cardiac irritants (eg caffeine, alcohol, oral decongestants etc), stress reduction measures, consider magnesium supplementation (100-400 mg daily) to decrease palpitations.   Discussed clean eating (eg Mediterranean style plant based eating plan recommended) and regular exercise/staying as physically active as possible.  Include balance exercises and strength training and core strengthening exercises for bone health and to decrease risk for falls.  Reviewed importance of good self care (e.g. meditation, yoga, adequate rest, regular exercise, magnesium, clean eating, etc.).  Follow up with specialists as recommended by them.   Follow up for next physical in one year. Schedule RPA visit in about 6 months and repeat fasting labs at that time.  Additional time spent addressing new or existing problems, requiring additional work outside of the normal scope of a routine annual exam: 20 minutes.

## 2024-04-18 LAB
ALBUMIN SERPL ELPH-MCNC: 4.2 G/DL
ALP BLD-CCNC: 100 U/L
ALT SERPL-CCNC: 14 U/L
ANION GAP SERPL CALC-SCNC: 10 MMOL/L
AST SERPL-CCNC: 12 U/L
BASOPHILS # BLD AUTO: 0.04 K/UL
BASOPHILS NFR BLD AUTO: 0.6 %
BILIRUB SERPL-MCNC: 0.6 MG/DL
BUN SERPL-MCNC: 17 MG/DL
CALCIUM SERPL-MCNC: 9 MG/DL
CHLORIDE SERPL-SCNC: 106 MMOL/L
CHOLEST SERPL-MCNC: 107 MG/DL
CO2 SERPL-SCNC: 26 MMOL/L
CREAT SERPL-MCNC: 0.59 MG/DL
CREAT SPEC-SCNC: 98 MG/DL
EGFR: 91 ML/MIN/1.73M2
EOSINOPHIL # BLD AUTO: 0.09 K/UL
EOSINOPHIL NFR BLD AUTO: 1.3 %
ESTIMATED AVERAGE GLUCOSE: 146 MG/DL
GLUCOSE SERPL-MCNC: 140 MG/DL
HBA1C MFR BLD HPLC: 6.7 %
HCT VFR BLD CALC: 36.6 %
HDLC SERPL-MCNC: 40 MG/DL
HGB BLD-MCNC: 11.8 G/DL
IMM GRANULOCYTES NFR BLD AUTO: 0.4 %
LDLC SERPL CALC-MCNC: 48 MG/DL
LYMPHOCYTES # BLD AUTO: 1.76 K/UL
LYMPHOCYTES NFR BLD AUTO: 24.8 %
MAN DIFF?: NORMAL
MCHC RBC-ENTMCNC: 29.1 PG
MCHC RBC-ENTMCNC: 32.2 GM/DL
MCV RBC AUTO: 90.4 FL
MICROALBUMIN 24H UR DL<=1MG/L-MCNC: 1.9 MG/DL
MICROALBUMIN/CREAT 24H UR-RTO: 20 MG/G
MONOCYTES # BLD AUTO: 0.49 K/UL
MONOCYTES NFR BLD AUTO: 6.9 %
NEUTROPHILS # BLD AUTO: 4.68 K/UL
NEUTROPHILS NFR BLD AUTO: 66 %
NONHDLC SERPL-MCNC: 67 MG/DL
PLATELET # BLD AUTO: 180 K/UL
POTASSIUM SERPL-SCNC: 4.5 MMOL/L
PROT SERPL-MCNC: 6.3 G/DL
RBC # BLD: 4.05 M/UL
RBC # FLD: 14.6 %
SODIUM SERPL-SCNC: 143 MMOL/L
TRIGL SERPL-MCNC: 96 MG/DL
WBC # FLD AUTO: 7.09 K/UL

## 2024-06-03 ENCOUNTER — OUTPATIENT (OUTPATIENT)
Dept: OUTPATIENT SERVICES | Facility: HOSPITAL | Age: 79
LOS: 1 days | End: 2024-06-03
Payer: MEDICARE

## 2024-06-03 ENCOUNTER — APPOINTMENT (OUTPATIENT)
Dept: CT IMAGING | Facility: CLINIC | Age: 79
End: 2024-06-03
Payer: MEDICARE

## 2024-06-03 DIAGNOSIS — F17.210 NICOTINE DEPENDENCE, CIGARETTES, UNCOMPLICATED: ICD-10-CM

## 2024-06-03 PROCEDURE — 71271 CT THORAX LUNG CANCER SCR C-: CPT

## 2024-06-03 PROCEDURE — 71271 CT THORAX LUNG CANCER SCR C-: CPT | Mod: 26

## 2024-06-20 ENCOUNTER — APPOINTMENT (OUTPATIENT)
Dept: INTERNAL MEDICINE | Facility: CLINIC | Age: 79
End: 2024-06-20
Payer: MEDICARE

## 2024-06-20 VITALS
RESPIRATION RATE: 18 BRPM | BODY MASS INDEX: 34.55 KG/M2 | TEMPERATURE: 98.4 F | SYSTOLIC BLOOD PRESSURE: 140 MMHG | HEIGHT: 63 IN | WEIGHT: 195 LBS | HEART RATE: 63 BPM | OXYGEN SATURATION: 98 % | DIASTOLIC BLOOD PRESSURE: 80 MMHG

## 2024-06-20 DIAGNOSIS — F17.210 NICOTINE DEPENDENCE, CIGARETTES, UNCOMPLICATED: ICD-10-CM

## 2024-06-20 DIAGNOSIS — Z87.891 PERSONAL HISTORY OF NICOTINE DEPENDENCE: ICD-10-CM

## 2024-06-20 DIAGNOSIS — J44.9 CHRONIC OBSTRUCTIVE PULMONARY DISEASE, UNSPECIFIED: ICD-10-CM

## 2024-06-20 DIAGNOSIS — F17.200 NICOTINE DEPENDENCE, UNSPECIFIED, UNCOMPLICATED: ICD-10-CM

## 2024-06-20 DIAGNOSIS — R91.1 SOLITARY PULMONARY NODULE: ICD-10-CM

## 2024-06-20 DIAGNOSIS — R93.89 ABNORMAL FINDINGS ON DIAGNOSTIC IMAGING OF OTHER SPECIFIED BODY STRUCTURES: ICD-10-CM

## 2024-06-20 PROCEDURE — 99215 OFFICE O/P EST HI 40 MIN: CPT

## 2024-06-20 RX ORDER — FLUTICASONE PROPIONATE 44 UG/1
44 AEROSOL, METERED RESPIRATORY (INHALATION)
Qty: 1 | Refills: 0 | Status: DISCONTINUED | COMMUNITY
Start: 2023-08-17 | End: 2024-06-20

## 2024-06-20 RX ORDER — ENALAPRIL MALEATE 5 MG/1
5 TABLET ORAL
Qty: 90 | Refills: 3 | Status: DISCONTINUED | COMMUNITY
End: 2024-06-20

## 2024-06-20 NOTE — REVIEW OF SYSTEMS
[TextEntry] : Constitutional:  no fever, no chills, no poor appetite.   HEENT:  no ear disturbance,  no sinus problems.  Respiratory: . per HPI.  Cardiovascular:  no chest discomfort, no edema, no orthopnea, no PND, no syncope.

## 2024-06-20 NOTE — HISTORY OF PRESENT ILLNESS
[Former] : former [>= 20 pack years] : >= 20 pack years [TextBox_4] : The patient was seen by Dr. Rubio in past who has recently retired. Hence I am seeing  the patient. This is a new patient visit for me. I extensively reviewed the chart for relevant data (notes, imaging, labs etc.)  The patient presents to the office today to review the results of her recent CAT scan. She reports having quit smoking on her 78th birthday and denies any current difficulty breathing. The patient has a history of atrial fibrillation and has seen a heart doctor for this issue. She has two sleep study devices at home, as Dr. Bush ordered a sleep study due to the connection between sleep apnea and AFib. The patient is considering whether to do the sleep study at a sleep center or at home.  The patient has been experiencing a dry cough, which she attributes to the side effects of enalapril. She has stopped taking enalapril for three days and reports feeling better.   Weight: The patient is also considering taking Ozempic for her borderline diabetes, with a current HbA1c of 6.7, but expresses concern about potential side effects. [YearQuit] : 10/2023 [Lung Cancer Screening] : Patient underwent lung cancer screening [2] : 2 [TextBox_12] : 11/2022 [TextBox_42] : 06/2024

## 2024-06-20 NOTE — PLAN
[TextEntry] : Lung Nodule: - The patient's chest CT scan from April 2022 revealed a stable 5mm lung nodule, a CT scan in 06/ 2024showing no change since the previous scan. - The plan includes scheduling a repeat CT scan in June 2025 for continued monitoring of the nodule. She will put a reminder for herself.   Smoking History and mild COPD: - The patient has a history of smoking but quit on her 78th birthday. Pulmonary function tests (PFTs) indicate a minimal reduction in lung function. - Spiriva, one puff daily, has been prescribed for symptomatic improvement. She is advised to monitor her symptoms and report any changes in breathing, cough, or activity level. The need for Spiriva will be reevaluated after2 months.  Atrial Fibrillation and Potential Sleep Apnea: - The patient has a history of atrial fibrillation and risk factors for sleep apnea, including snoring, a crowded throat, and being slightly overweight. - A sleep study has been recommended to assess for sleep apnea, which may contribute to her atrial fibrillation. Treatment options for sleep apnea, such as CPAP, oral appliances, or other devices, will be discussed depending on the severity of the condition. A follow-up appointment is scheduled in two months.  Dry cough/ Medication Side Effects  : - The patient reported a dry cough and throat discomfort while taking enalapril, which has since been discontinued.    Time spent 45 min - including, history examination, detailed relevant data review, counselling education and discussion of treatment.

## 2024-06-20 NOTE — PHYSICAL EXAM
[TextEntry] : Constitutional: no acute distress   HEENT: normal oropharynx, Mallampati Class: III  Neck: normal appearance, no neck mass  Cardiac: normal rate/rhythm, normal s1, s2  Pulmonary: no resp distress, clear to auscultation bilaterally, no r/r/w Chest: no abnormalities Musculoskeletal: normal gait  Extremities: no clubbing, no cyanosis, no edema  Skin: normal color/ pigmentation  Psychiatric: oriented x3, normal affect

## 2024-06-21 RX ORDER — TIOTROPIUM BROMIDE 18 UG/1
18 CAPSULE ORAL; RESPIRATORY (INHALATION) DAILY
Qty: 1 | Refills: 1 | Status: ACTIVE | COMMUNITY
Start: 2024-06-20 | End: 1900-01-01

## 2024-06-25 ENCOUNTER — APPOINTMENT (OUTPATIENT)
Dept: PULMONOLOGY | Facility: CLINIC | Age: 79
End: 2024-06-25

## 2024-06-27 ENCOUNTER — EMERGENCY (EMERGENCY)
Facility: HOSPITAL | Age: 79
LOS: 0 days | Discharge: HOME | End: 2024-06-28
Attending: EMERGENCY MEDICINE
Payer: MEDICARE

## 2024-06-27 VITALS
RESPIRATION RATE: 18 BRPM | HEIGHT: 64 IN | WEIGHT: 195.11 LBS | DIASTOLIC BLOOD PRESSURE: 60 MMHG | SYSTOLIC BLOOD PRESSURE: 128 MMHG | HEART RATE: 77 BPM | TEMPERATURE: 99 F | OXYGEN SATURATION: 96 %

## 2024-06-27 DIAGNOSIS — R11.10 VOMITING, UNSPECIFIED: ICD-10-CM

## 2024-06-27 DIAGNOSIS — R11.2 NAUSEA WITH VOMITING, UNSPECIFIED: ICD-10-CM

## 2024-06-27 LAB
ANION GAP SERPL CALC-SCNC: 8 MMOL/L — SIGNIFICANT CHANGE UP (ref 5–17)
BASOPHILS # BLD AUTO: 0.03 K/UL — SIGNIFICANT CHANGE UP (ref 0–0.2)
BASOPHILS NFR BLD AUTO: 0.4 % — SIGNIFICANT CHANGE UP (ref 0–2)
BUN SERPL-MCNC: 14 MG/DL — SIGNIFICANT CHANGE UP (ref 7–23)
CALCIUM SERPL-MCNC: 8.9 MG/DL — SIGNIFICANT CHANGE UP (ref 8.5–10.1)
CHLORIDE SERPL-SCNC: 107 MMOL/L — SIGNIFICANT CHANGE UP (ref 96–108)
CO2 SERPL-SCNC: 23 MMOL/L — SIGNIFICANT CHANGE UP (ref 22–31)
CREAT SERPL-MCNC: 0.62 MG/DL — SIGNIFICANT CHANGE UP (ref 0.5–1.3)
EGFR: 90 ML/MIN/1.73M2 — SIGNIFICANT CHANGE UP
EOSINOPHIL # BLD AUTO: 0.02 K/UL — SIGNIFICANT CHANGE UP (ref 0–0.5)
EOSINOPHIL NFR BLD AUTO: 0.3 % — SIGNIFICANT CHANGE UP (ref 0–6)
GLUCOSE SERPL-MCNC: 152 MG/DL — HIGH (ref 70–99)
HCT VFR BLD CALC: 34.7 % — SIGNIFICANT CHANGE UP (ref 34.5–45)
HGB BLD-MCNC: 11.5 G/DL — SIGNIFICANT CHANGE UP (ref 11.5–15.5)
IMM GRANULOCYTES NFR BLD AUTO: 0.4 % — SIGNIFICANT CHANGE UP (ref 0–0.9)
LYMPHOCYTES # BLD AUTO: 0.81 K/UL — LOW (ref 1–3.3)
LYMPHOCYTES # BLD AUTO: 11.4 % — LOW (ref 13–44)
MCHC RBC-ENTMCNC: 28.8 PG — SIGNIFICANT CHANGE UP (ref 27–34)
MCHC RBC-ENTMCNC: 33.1 GM/DL — SIGNIFICANT CHANGE UP (ref 32–36)
MCV RBC AUTO: 87 FL — SIGNIFICANT CHANGE UP (ref 80–100)
MONOCYTES # BLD AUTO: 0.74 K/UL — SIGNIFICANT CHANGE UP (ref 0–0.9)
MONOCYTES NFR BLD AUTO: 10.4 % — SIGNIFICANT CHANGE UP (ref 2–14)
NEUTROPHILS # BLD AUTO: 5.5 K/UL — SIGNIFICANT CHANGE UP (ref 1.8–7.4)
NEUTROPHILS NFR BLD AUTO: 77.1 % — HIGH (ref 43–77)
PLATELET # BLD AUTO: 167 K/UL — SIGNIFICANT CHANGE UP (ref 150–400)
POTASSIUM SERPL-MCNC: 3.9 MMOL/L — SIGNIFICANT CHANGE UP (ref 3.5–5.3)
POTASSIUM SERPL-SCNC: 3.9 MMOL/L — SIGNIFICANT CHANGE UP (ref 3.5–5.3)
RBC # BLD: 3.99 M/UL — SIGNIFICANT CHANGE UP (ref 3.8–5.2)
RBC # FLD: 13.6 % — SIGNIFICANT CHANGE UP (ref 10.3–14.5)
SODIUM SERPL-SCNC: 138 MMOL/L — SIGNIFICANT CHANGE UP (ref 135–145)
WBC # BLD: 7.13 K/UL — SIGNIFICANT CHANGE UP (ref 3.8–10.5)
WBC # FLD AUTO: 7.13 K/UL — SIGNIFICANT CHANGE UP (ref 3.8–10.5)

## 2024-06-27 PROCEDURE — 85025 COMPLETE CBC W/AUTO DIFF WBC: CPT

## 2024-06-27 PROCEDURE — 99283 EMERGENCY DEPT VISIT LOW MDM: CPT

## 2024-06-27 PROCEDURE — 99284 EMERGENCY DEPT VISIT MOD MDM: CPT

## 2024-06-27 PROCEDURE — 36415 COLL VENOUS BLD VENIPUNCTURE: CPT

## 2024-06-27 PROCEDURE — 80048 BASIC METABOLIC PNL TOTAL CA: CPT

## 2024-06-27 RX ORDER — ONDANSETRON 8 MG/1
4 TABLET, FILM COATED ORAL ONCE
Refills: 0 | Status: COMPLETED | OUTPATIENT
Start: 2024-06-27 | End: 2024-06-27

## 2024-06-27 RX ORDER — SODIUM CHLORIDE 9 MG/ML
1000 INJECTION, SOLUTION INTRAVENOUS ONCE
Refills: 0 | Status: COMPLETED | OUTPATIENT
Start: 2024-06-27 | End: 2024-06-27

## 2024-06-27 RX ADMIN — SODIUM CHLORIDE 2000 MILLILITER(S): 9 INJECTION, SOLUTION INTRAVENOUS at 22:29

## 2024-06-27 NOTE — ED PROVIDER NOTE - CLINICAL SUMMARY MEDICAL DECISION MAKING FREE TEXT BOX
at my evaluation patient has no complaints she vomited once at home.  she is concerned the symptoms are a side effect of the valsartan she took will check labs hydrate if still feeling well feels safe to go home return to ED for intractable headache persistent vomiting chest pain shortness of breath any overall worsening

## 2024-06-27 NOTE — ED ADULT NURSE NOTE - SUICIDE SCREENING QUESTION 3
NEW PATIENT      Licha Prakash is 52 year old female here today for evaluation of left knee pain.  She describes 3-4 weeks of left knee pain, denies any injury.  She describes intermittent left knee pain.  She has pain at night.  She has tried Tylenol and topicals.  She is accompanied by her daughter today who is serving as .  She denies any numbness or tingling radiating down the left lower extremity although notes some discomfort in her toes.    PAST MEDICAL HISTORY:    Hypertension                                                    Comment: during 1st pregnancy    Other dyspnea and respiratory abnormality                       Comment: occasional    Diabetes mellitus (CMS/HCC)                                   High cholesterol                                              Fatty infiltration of liver                     3/28/2017     Diabetic nephropathy (CMS/HCC)                  5/22/2017     Anemia                                          2/21/2018     Failed moderate sedation during procedure       03/22/2018    Hypothyroidism                                  10/25/2021    PAST SURGICAL HISTORY:      PAP SMEAR,ROUTINE                               12/17/2009    HERNIA REPAIR                                                   Comment: Umbilical    TUBAL LIGATION                                  2006          HYSTERECTOMY                                    2015/8          Comment: T LH BSO robotic    COLONOSCOPY                                     03/22/2018      Comment: Only to be performed by Female Physician     CURRENT MEDICATIONS:    Current Outpatient Medications   Medication   • levothyroxine 50 MCG tablet   • diclofenac (Voltaren) 1 % gel   • lisinopril (ZESTRIL) 20 MG tablet   • pantoprazole (PROTONIX) 40 MG tablet   • escitalopram (Lexapro) 10 MG tablet   • blood glucose (FREESTYLE LITE) test strip   • metFORMIN (GLUCOPHAGE-XR) 500 MG 24 hr tablet   • rosuvastatin (Crestor) 20 MG tablet   •  dulaglutide (Trulicity) 1.5 MG/0.5ML pen-injector   • insulin glargine (Lantus SoloStar) 100 UNIT/ML pen-injector   • hydrochlorothiazide (HYDRODIURIL) 25 MG tablet   • aspirin-acetaminophen-caffeine (EXCEDRIN MIGRAINE) 250-250-65 MG per tablet   • Insulin Pen Needle (Pen Needles) 31G X 5 MM Misc   • Ferrous Bisglycinate Chelate 28 MG Cap   • Blood Glucose Monitoring Suppl (FreeStyle Lite) Device   • Lancets (freestyle) Misc   • acetaminophen (TYLENOL) 500 MG tablet     No current facility-administered medications for this visit.       ALLERGIES:    ALLERGIES:   Allergen Reactions   • No Known Drug Allergy        SOCIAL HISTORY:    Social History     Tobacco Use   • Smoking status: Never Smoker   • Smokeless tobacco: Never Used   Substance Use Topics   • Alcohol use: No     Alcohol/week: 0.0 standard drinks   • Drug use: No       REVIEW OF SYSTEMS:    As noted above and all other systems negative.    PHYSICIAL EXAMINATION:    Vital Signs:  Last menstrual period 05/08/2015.  General:  The patient is alert and oriented x 3.  She is in no acute distress.  She is well groomed and cooperative with the exam.   Extremities: On examination of the left knee, skin is intact.  There is no effusion.  Range of motion is full, no crepitus.  Extensor mechanism is intact.  Medial joint line is nontender.  Lateral joint line is nontender.  There is no instability with valgus or varus stress tests at full extension or 30 degrees.  Pedro's test is negative.  Lachman's test is stable.  Anterior drawer is stable.  Neurovascularly intact.    IMAGING:  Radiographs of the left knee previously obtained demonstrated a small effusion.  Joint spaces are well preserved.    IMPRESSION:    Left knee pain.    PLAN:    Treatment options were discussed.  She agreed to therapy, ordered today.  She may continue her topicals and Tylenol for further pain control.  Follow-up will be in 6 weeks only as needed.  We discussed if she fails to improve with  therapy that we would consider an MRI scan.    I, Alexandra Mendiola PA-C, have personally examined the patient and discussed treatment plan with Dr. Nam Samano.    ESCOBAR Alacntara Katherine Anderson PA-C, am now acting as scribe for Dr. Samano:    52-year-old female presents today, accompanied by her daughter, with left knee pain for the last several weeks.  She denies any macro trauma inciting her symptoms.    She was alert and oriented x3.  Normal mood, normal affect.  There is no effusion to the left knee.  Extensor mechanism intact.  No instability.  Motion appeared full.  She had no cane or crutch.  Gait was not antalgic.    Radiographs of the left knee previously obtained reviewed on screen demonstrating no significant degenerative changes.  Radiology reported small effusion.    Diagnosis: Left knee pain.    Plan:  Treatment options were discussed.  Therapy was ordered for her left knee.  Follow-up will be in 6 weeks only as needed.  Work status was not addressed.      The documentation recorded by the scribe accurately and completely reflects the service(s) I personally performed and the decisions made by me.      YANIRA Samano MD     No

## 2024-06-27 NOTE — ED ADULT NURSE NOTE - NSFALLRISKINTERV_ED_ALL_ED

## 2024-06-27 NOTE — ED PROVIDER NOTE - OBJECTIVE STATEMENT
Patient presents to ED describing not feeling well all day after she took her first dose of valsartan which she started because she was having a chronic cough on enalapril she states that just felt off I felt weak I am feeling better now I had 1 episode of nonbloody nonbilious vomiting at my evaluation she denies any headache no fevers or chest pain no shortness of breath no abdominal pain no urinary frequency urgency or dysuria

## 2024-06-27 NOTE — ED ADULT TRIAGE NOTE - CHIEF COMPLAINT QUOTE
pt bibems from home s/p taking valsartan at 10am this morning and now feeling "off". Endorses nausea, - abd pain, - sick contacts at home. Pt A&ox4, vs stable, No s/s of distress.

## 2024-06-27 NOTE — ED PROVIDER NOTE - PATIENT PORTAL LINK FT
You can access the FollowMyHealth Patient Portal offered by [facility] by registering at the following website: [email or web address]. By joining [facility]’s FollowMyHealth portal, you will also be able to view your health information using other applications (apps) compatible with our system.

## 2024-06-27 NOTE — ED ADULT NURSE NOTE - CAS TRG GEN SKIN CONDITION
Warm Composite Graft Text: The defect edges were debeveled with a #15 scalpel blade.  Given the location of the defect, shape of the defect, the proximity to free margins and the fact the defect was full thickness a composite graft was deemed most appropriate.  The defect was outline and then transferred to the donor site.  A full thickness graft was then excised from the donor site. The graft was then placed in the primary defect, oriented appropriately and then sutured into place.  The secondary defect was then repaired using a primary closure.

## 2024-06-27 NOTE — ED PROVIDER NOTE - PSYCHIATRIC, MLM
Called pt with new appt for 11/20. Pt understood teaching.    Alert and oriented to person, place, time/situation. normal mood and affect. no apparent risk to self or others.

## 2024-06-27 NOTE — ED ADULT NURSE NOTE - OBJECTIVE STATEMENT
Pt bibems s/p taking medication and feeling "off". Pt is A&Ox4 at this time. She states "I took Atorvastatin at 10 am today and I was nauseous and I had a headache and abdominal pain earlier. I think it may have been from the medication". Pt denies pain at this time.

## 2024-06-27 NOTE — ED ADULT TRIAGE NOTE - CCCP TRG CHIEF CMPLNT
Head, normocephalic, atraumatic, Face, Face within normal limits, Ears, External ears within normal limits see chief complaint quote

## 2024-06-27 NOTE — ED ADULT NURSE NOTE - CAS DISCH TRANSFER METHOD
assistance  Ambulation  Ambulation?: Yes  Ambulation 1  Surface: level tile  Device: Rolling Walker  Assistance: Contact guard assistance  Quality of Gait: flexed posture, slow flaco   Distance: 40ft x 2   Comments: Pt limited by gastroc cramping and fatigue. Stairs/Curb  Stairs?: No     Balance  Posture: Fair  Sitting - Static: Good(Sat at EOB for ~2 minutes SBA)  Sitting - Dynamic: Good;-  Standing - Static: Fair;-  Standing - Dynamic: Fair;-  Comments: Static standing 30\" x 3 w/RW CGA  Exercises  Seated LE exercise program: Long Arc Quads, hip abduction/adduction, heel/toe raises, and marches. Reps:  x 15         Assessment   Body structures, Functions, Activity limitations: Decreased functional mobility ; Decreased strength;Decreased balance;Decreased safe awareness;Decreased endurance  Assessment: pt able 40ft x 2 RW CGA, gastroc spasms improved with distance  Prognosis: Good  Patient Education: Stretching gastrocs, importance of ambulation and increasing mobility   REQUIRES PT FOLLOW UP: Yes  Activity Tolerance  Activity Tolerance: Patient Tolerated treatment well; Other  Activity Tolerance: muscle spasms in gastrocs limiting standing and gait           Goals  Short term goals  Time Frame for Short term goals: 14 visits  Short term goal 1: Pt will be Betty with bed mobility  Short term goal 2: Pt will be Betty transfers  Short term goal 3: Pt will be SBA amb 125' RW   Short term goal 4: Pt will be safe to attempt steps    Plan    Plan  Times per week: 5-6x/wk  Current Treatment Recommendations: Strengthening, Balance Training, Functional Mobility Training, Transfer Training, Endurance Training, Cognitive Reorientation, Gait Training, Stair training, Home Exercise Program, Safety Education & Training, Equipment Evaluation, Education, & procurement, Patient/Caregiver Education & Training  Safety Devices  Type of devices: Call light within reach, Gait belt, Nurse notified, All fall risk precautions in place, Left in chair, Chair alarm in place  Restraints  Initially in place: No     Therapy Time   Individual Concurrent Group Co-treatment   Time In 0916         Time Out 0953         Minutes 40                 Donna Lang, PTA Private car

## 2024-06-28 VITALS
TEMPERATURE: 98 F | RESPIRATION RATE: 18 BRPM | HEART RATE: 77 BPM | SYSTOLIC BLOOD PRESSURE: 154 MMHG | DIASTOLIC BLOOD PRESSURE: 60 MMHG | OXYGEN SATURATION: 98 %

## 2024-07-11 ENCOUNTER — EMERGENCY (EMERGENCY)
Facility: HOSPITAL | Age: 79
LOS: 0 days | Discharge: HOME | End: 2024-07-12
Attending: EMERGENCY MEDICINE
Payer: MEDICARE

## 2024-07-11 VITALS
HEART RATE: 119 BPM | SYSTOLIC BLOOD PRESSURE: 158 MMHG | RESPIRATION RATE: 20 BRPM | WEIGHT: 199.3 LBS | DIASTOLIC BLOOD PRESSURE: 90 MMHG | OXYGEN SATURATION: 100 %

## 2024-07-11 DIAGNOSIS — R00.2 PALPITATIONS: ICD-10-CM

## 2024-07-11 DIAGNOSIS — I48.20 CHRONIC ATRIAL FIBRILLATION, UNSPECIFIED: ICD-10-CM

## 2024-07-11 DIAGNOSIS — Z79.01 LONG TERM (CURRENT) USE OF ANTICOAGULANTS: ICD-10-CM

## 2024-07-11 DIAGNOSIS — I51.7 CARDIOMEGALY: ICD-10-CM

## 2024-07-11 LAB
ALBUMIN SERPL ELPH-MCNC: 3.6 G/DL — SIGNIFICANT CHANGE UP (ref 3.3–5)
ALP SERPL-CCNC: 123 U/L — HIGH (ref 40–120)
ALT FLD-CCNC: 23 U/L — SIGNIFICANT CHANGE UP (ref 12–78)
ANION GAP SERPL CALC-SCNC: 7 MMOL/L — SIGNIFICANT CHANGE UP (ref 5–17)
AST SERPL-CCNC: 13 U/L — LOW (ref 15–37)
BASOPHILS # BLD AUTO: 0.04 K/UL — SIGNIFICANT CHANGE UP (ref 0–0.2)
BASOPHILS NFR BLD AUTO: 0.6 % — SIGNIFICANT CHANGE UP (ref 0–2)
BILIRUB SERPL-MCNC: 0.4 MG/DL — SIGNIFICANT CHANGE UP (ref 0.2–1.2)
BUN SERPL-MCNC: 19 MG/DL — SIGNIFICANT CHANGE UP (ref 7–23)
CALCIUM SERPL-MCNC: 9.3 MG/DL — SIGNIFICANT CHANGE UP (ref 8.5–10.1)
CHLORIDE SERPL-SCNC: 109 MMOL/L — HIGH (ref 96–108)
CO2 SERPL-SCNC: 26 MMOL/L — SIGNIFICANT CHANGE UP (ref 22–31)
CREAT SERPL-MCNC: 0.83 MG/DL — SIGNIFICANT CHANGE UP (ref 0.5–1.3)
EGFR: 71 ML/MIN/1.73M2 — SIGNIFICANT CHANGE UP
EOSINOPHIL # BLD AUTO: 0.1 K/UL — SIGNIFICANT CHANGE UP (ref 0–0.5)
EOSINOPHIL NFR BLD AUTO: 1.4 % — SIGNIFICANT CHANGE UP (ref 0–6)
GLUCOSE SERPL-MCNC: 199 MG/DL — HIGH (ref 70–99)
HCT VFR BLD CALC: 38.1 % — SIGNIFICANT CHANGE UP (ref 34.5–45)
HGB BLD-MCNC: 12.8 G/DL — SIGNIFICANT CHANGE UP (ref 11.5–15.5)
IMM GRANULOCYTES NFR BLD AUTO: 0.3 % — SIGNIFICANT CHANGE UP (ref 0–0.9)
LYMPHOCYTES # BLD AUTO: 2.86 K/UL — SIGNIFICANT CHANGE UP (ref 1–3.3)
LYMPHOCYTES # BLD AUTO: 39.9 % — SIGNIFICANT CHANGE UP (ref 13–44)
MCHC RBC-ENTMCNC: 29.6 PG — SIGNIFICANT CHANGE UP (ref 27–34)
MCHC RBC-ENTMCNC: 33.6 GM/DL — SIGNIFICANT CHANGE UP (ref 32–36)
MCV RBC AUTO: 88.2 FL — SIGNIFICANT CHANGE UP (ref 80–100)
MONOCYTES # BLD AUTO: 0.5 K/UL — SIGNIFICANT CHANGE UP (ref 0–0.9)
MONOCYTES NFR BLD AUTO: 7 % — SIGNIFICANT CHANGE UP (ref 2–14)
NEUTROPHILS # BLD AUTO: 3.64 K/UL — SIGNIFICANT CHANGE UP (ref 1.8–7.4)
NEUTROPHILS NFR BLD AUTO: 50.8 % — SIGNIFICANT CHANGE UP (ref 43–77)
PLATELET # BLD AUTO: 225 K/UL — SIGNIFICANT CHANGE UP (ref 150–400)
POTASSIUM SERPL-MCNC: 4.1 MMOL/L — SIGNIFICANT CHANGE UP (ref 3.5–5.3)
POTASSIUM SERPL-SCNC: 4.1 MMOL/L — SIGNIFICANT CHANGE UP (ref 3.5–5.3)
PROT SERPL-MCNC: 7.3 GM/DL — SIGNIFICANT CHANGE UP (ref 6–8.3)
RBC # BLD: 4.32 M/UL — SIGNIFICANT CHANGE UP (ref 3.8–5.2)
RBC # FLD: 13.8 % — SIGNIFICANT CHANGE UP (ref 10.3–14.5)
SODIUM SERPL-SCNC: 142 MMOL/L — SIGNIFICANT CHANGE UP (ref 135–145)
TROPONIN I, HIGH SENSITIVITY RESULT: 17.05 NG/L — SIGNIFICANT CHANGE UP
WBC # BLD: 7.16 K/UL — SIGNIFICANT CHANGE UP (ref 3.8–10.5)
WBC # FLD AUTO: 7.16 K/UL — SIGNIFICANT CHANGE UP (ref 3.8–10.5)

## 2024-07-11 PROCEDURE — 71045 X-RAY EXAM CHEST 1 VIEW: CPT

## 2024-07-11 PROCEDURE — 85025 COMPLETE CBC W/AUTO DIFF WBC: CPT

## 2024-07-11 PROCEDURE — 93005 ELECTROCARDIOGRAM TRACING: CPT

## 2024-07-11 PROCEDURE — 93010 ELECTROCARDIOGRAM REPORT: CPT

## 2024-07-11 PROCEDURE — 84484 ASSAY OF TROPONIN QUANT: CPT

## 2024-07-11 PROCEDURE — 99291 CRITICAL CARE FIRST HOUR: CPT

## 2024-07-11 PROCEDURE — 80053 COMPREHEN METABOLIC PANEL: CPT

## 2024-07-11 PROCEDURE — 71045 X-RAY EXAM CHEST 1 VIEW: CPT | Mod: 26

## 2024-07-11 PROCEDURE — 36415 COLL VENOUS BLD VENIPUNCTURE: CPT

## 2024-07-11 PROCEDURE — 99285 EMERGENCY DEPT VISIT HI MDM: CPT | Mod: 25

## 2024-07-11 PROCEDURE — 96375 TX/PRO/DX INJ NEW DRUG ADDON: CPT

## 2024-07-11 PROCEDURE — 96374 THER/PROPH/DIAG INJ IV PUSH: CPT

## 2024-07-11 RX ORDER — SODIUM CHLORIDE 0.9 % (FLUSH) 0.9 %
500 SYRINGE (ML) INJECTION ONCE
Refills: 0 | Status: COMPLETED | OUTPATIENT
Start: 2024-07-11 | End: 2024-07-11

## 2024-07-11 RX ORDER — METOPROLOL TARTRATE 50 MG
5 TABLET ORAL ONCE
Refills: 0 | Status: COMPLETED | OUTPATIENT
Start: 2024-07-11 | End: 2024-07-11

## 2024-07-11 RX ORDER — DILTIAZEM HYDROCHLORIDE 240 MG/1
10 CAPSULE, EXTENDED RELEASE ORAL ONCE
Refills: 0 | Status: COMPLETED | OUTPATIENT
Start: 2024-07-11 | End: 2024-07-11

## 2024-07-11 RX ADMIN — DILTIAZEM HYDROCHLORIDE 10 MILLIGRAM(S): 240 CAPSULE, EXTENDED RELEASE ORAL at 23:10

## 2024-07-11 RX ADMIN — Medication 500 MILLILITER(S): at 23:18

## 2024-07-12 VITALS
TEMPERATURE: 98 F | DIASTOLIC BLOOD PRESSURE: 63 MMHG | OXYGEN SATURATION: 98 % | RESPIRATION RATE: 18 BRPM | HEART RATE: 98 BPM | SYSTOLIC BLOOD PRESSURE: 117 MMHG

## 2024-07-12 RX ORDER — METOPROLOL TARTRATE 50 MG
50 TABLET ORAL ONCE
Refills: 0 | Status: COMPLETED | OUTPATIENT
Start: 2024-07-12 | End: 2024-07-12

## 2024-07-12 RX ORDER — SODIUM CHLORIDE 0.9 % (FLUSH) 0.9 %
1000 SYRINGE (ML) INJECTION ONCE
Refills: 0 | Status: COMPLETED | OUTPATIENT
Start: 2024-07-12 | End: 2024-07-12

## 2024-07-12 RX ADMIN — Medication 1000 MILLILITER(S): at 05:14

## 2024-07-12 RX ADMIN — Medication 50 MILLIGRAM(S): at 01:33

## 2024-07-12 RX ADMIN — Medication 5 MILLIGRAM(S): at 00:27

## 2024-07-15 ENCOUNTER — APPOINTMENT (OUTPATIENT)
Dept: FAMILY MEDICINE | Facility: CLINIC | Age: 79
End: 2024-07-15
Payer: MEDICARE

## 2024-07-15 VITALS
OXYGEN SATURATION: 96 % | TEMPERATURE: 97.5 F | HEIGHT: 63 IN | DIASTOLIC BLOOD PRESSURE: 76 MMHG | BODY MASS INDEX: 34.55 KG/M2 | WEIGHT: 195 LBS | HEART RATE: 60 BPM | SYSTOLIC BLOOD PRESSURE: 128 MMHG

## 2024-07-15 DIAGNOSIS — M25.551 PAIN IN RIGHT HIP: ICD-10-CM

## 2024-07-15 PROCEDURE — 99213 OFFICE O/P EST LOW 20 MIN: CPT

## 2024-07-22 ENCOUNTER — APPOINTMENT (OUTPATIENT)
Dept: RADIOLOGY | Facility: CLINIC | Age: 79
End: 2024-07-22
Payer: MEDICARE

## 2024-07-22 PROCEDURE — 73502 X-RAY EXAM HIP UNI 2-3 VIEWS: CPT | Mod: 26,RT

## 2024-08-14 NOTE — PATIENT PROFILE ADULT - NSPROPTRIGHTNOTIFY_GEN_A_NUR
Your lab work shows that your blood sugar is severely elevated.    You need to restart taking your insulin. I have sent a refill to our pharmacy here. Follow a diabetic diet. Read attached instructions.   Your COVID is negative. Your chest XRAY does not show signs of pneumonia.   Follow up with your primary doctor or return to the ER for any new or worsening symptoms.   
declines

## 2024-08-28 ENCOUNTER — APPOINTMENT (OUTPATIENT)
Dept: INTERNAL MEDICINE | Facility: CLINIC | Age: 79
End: 2024-08-28

## 2024-09-02 DIAGNOSIS — Z86.39 PERSONAL HISTORY OF OTHER ENDOCRINE, NUTRITIONAL AND METABOLIC DISEASE: ICD-10-CM

## 2024-10-21 ENCOUNTER — APPOINTMENT (OUTPATIENT)
Dept: FAMILY MEDICINE | Facility: CLINIC | Age: 79
End: 2024-10-21
Payer: MEDICARE

## 2024-10-21 PROCEDURE — 36415 COLL VENOUS BLD VENIPUNCTURE: CPT

## 2024-10-24 LAB — MAGNESIUM SERPL-MCNC: 2 MG/DL

## 2024-10-27 ENCOUNTER — INPATIENT (INPATIENT)
Facility: HOSPITAL | Age: 79
LOS: 2 days | Discharge: HOME | DRG: 392 | End: 2024-10-30
Attending: STUDENT IN AN ORGANIZED HEALTH CARE EDUCATION/TRAINING PROGRAM | Admitting: STUDENT IN AN ORGANIZED HEALTH CARE EDUCATION/TRAINING PROGRAM
Payer: MEDICARE

## 2024-10-27 VITALS — HEIGHT: 64 IN

## 2024-10-27 DIAGNOSIS — K57.92 DIVERTICULITIS OF INTESTINE, PART UNSPECIFIED, WITHOUT PERFORATION OR ABSCESS WITHOUT BLEEDING: ICD-10-CM

## 2024-10-27 LAB
ALBUMIN SERPL ELPH-MCNC: 3.4 G/DL — SIGNIFICANT CHANGE UP (ref 3.3–5)
ALP SERPL-CCNC: 89 U/L — SIGNIFICANT CHANGE UP (ref 40–120)
ALT FLD-CCNC: 16 U/L — SIGNIFICANT CHANGE UP (ref 12–78)
ANION GAP SERPL CALC-SCNC: 6 MMOL/L — SIGNIFICANT CHANGE UP (ref 5–17)
APPEARANCE UR: CLEAR — SIGNIFICANT CHANGE UP
AST SERPL-CCNC: 11 U/L — LOW (ref 15–37)
BASOPHILS # BLD AUTO: 0.03 K/UL — SIGNIFICANT CHANGE UP (ref 0–0.2)
BASOPHILS NFR BLD AUTO: 0.3 % — SIGNIFICANT CHANGE UP (ref 0–2)
BILIRUB SERPL-MCNC: 1 MG/DL — SIGNIFICANT CHANGE UP (ref 0.2–1.2)
BILIRUB UR-MCNC: NEGATIVE — SIGNIFICANT CHANGE UP
BUN SERPL-MCNC: 14 MG/DL — SIGNIFICANT CHANGE UP (ref 7–23)
CALCIUM SERPL-MCNC: 9 MG/DL — SIGNIFICANT CHANGE UP (ref 8.5–10.1)
CHLORIDE SERPL-SCNC: 107 MMOL/L — SIGNIFICANT CHANGE UP (ref 96–108)
CO2 SERPL-SCNC: 24 MMOL/L — SIGNIFICANT CHANGE UP (ref 22–31)
COLOR SPEC: YELLOW — SIGNIFICANT CHANGE UP
CREAT SERPL-MCNC: 0.66 MG/DL — SIGNIFICANT CHANGE UP (ref 0.5–1.3)
DIFF PNL FLD: NEGATIVE — SIGNIFICANT CHANGE UP
EGFR: 88 ML/MIN/1.73M2 — SIGNIFICANT CHANGE UP
EOSINOPHIL # BLD AUTO: 0.02 K/UL — SIGNIFICANT CHANGE UP (ref 0–0.5)
EOSINOPHIL NFR BLD AUTO: 0.2 % — SIGNIFICANT CHANGE UP (ref 0–6)
GLUCOSE SERPL-MCNC: 159 MG/DL — HIGH (ref 70–99)
GLUCOSE UR QL: NEGATIVE MG/DL — SIGNIFICANT CHANGE UP
HCT VFR BLD CALC: 36.3 % — SIGNIFICANT CHANGE UP (ref 34.5–45)
HGB BLD-MCNC: 12.1 G/DL — SIGNIFICANT CHANGE UP (ref 11.5–15.5)
IMM GRANULOCYTES NFR BLD AUTO: 0.4 % — SIGNIFICANT CHANGE UP (ref 0–0.9)
KETONES UR-MCNC: 15 MG/DL
LEUKOCYTE ESTERASE UR-ACNC: NEGATIVE — SIGNIFICANT CHANGE UP
LIDOCAIN IGE QN: 16 U/L — SIGNIFICANT CHANGE UP (ref 13–75)
LYMPHOCYTES # BLD AUTO: 1.26 K/UL — SIGNIFICANT CHANGE UP (ref 1–3.3)
LYMPHOCYTES # BLD AUTO: 12.9 % — LOW (ref 13–44)
MCHC RBC-ENTMCNC: 29.7 PG — SIGNIFICANT CHANGE UP (ref 27–34)
MCHC RBC-ENTMCNC: 33.3 GM/DL — SIGNIFICANT CHANGE UP (ref 32–36)
MCV RBC AUTO: 89.2 FL — SIGNIFICANT CHANGE UP (ref 80–100)
MONOCYTES # BLD AUTO: 0.68 K/UL — SIGNIFICANT CHANGE UP (ref 0–0.9)
MONOCYTES NFR BLD AUTO: 7 % — SIGNIFICANT CHANGE UP (ref 2–14)
NEUTROPHILS # BLD AUTO: 7.74 K/UL — HIGH (ref 1.8–7.4)
NEUTROPHILS NFR BLD AUTO: 79.2 % — HIGH (ref 43–77)
NITRITE UR-MCNC: NEGATIVE — SIGNIFICANT CHANGE UP
PH UR: 5.5 — SIGNIFICANT CHANGE UP (ref 5–8)
PLATELET # BLD AUTO: 176 K/UL — SIGNIFICANT CHANGE UP (ref 150–400)
POTASSIUM SERPL-MCNC: 3.8 MMOL/L — SIGNIFICANT CHANGE UP (ref 3.5–5.3)
POTASSIUM SERPL-SCNC: 3.8 MMOL/L — SIGNIFICANT CHANGE UP (ref 3.5–5.3)
PROT SERPL-MCNC: 7.1 GM/DL — SIGNIFICANT CHANGE UP (ref 6–8.3)
PROT UR-MCNC: SIGNIFICANT CHANGE UP MG/DL
RBC # BLD: 4.07 M/UL — SIGNIFICANT CHANGE UP (ref 3.8–5.2)
RBC # FLD: 13.9 % — SIGNIFICANT CHANGE UP (ref 10.3–14.5)
SODIUM SERPL-SCNC: 137 MMOL/L — SIGNIFICANT CHANGE UP (ref 135–145)
SP GR SPEC: 1.02 — SIGNIFICANT CHANGE UP (ref 1–1.03)
TROPONIN I, HIGH SENSITIVITY RESULT: 17.78 NG/L — SIGNIFICANT CHANGE UP
UROBILINOGEN FLD QL: 1 MG/DL — SIGNIFICANT CHANGE UP (ref 0.2–1)
WBC # BLD: 9.77 K/UL — SIGNIFICANT CHANGE UP (ref 3.8–10.5)
WBC # FLD AUTO: 9.77 K/UL — SIGNIFICANT CHANGE UP (ref 3.8–10.5)

## 2024-10-27 PROCEDURE — 74177 CT ABD & PELVIS W/CONTRAST: CPT | Mod: 26,MC

## 2024-10-27 PROCEDURE — 99285 EMERGENCY DEPT VISIT HI MDM: CPT

## 2024-10-27 PROCEDURE — 85025 COMPLETE CBC W/AUTO DIFF WBC: CPT

## 2024-10-27 PROCEDURE — 93005 ELECTROCARDIOGRAM TRACING: CPT

## 2024-10-27 PROCEDURE — 81003 URINALYSIS AUTO W/O SCOPE: CPT

## 2024-10-27 PROCEDURE — 93010 ELECTROCARDIOGRAM REPORT: CPT

## 2024-10-27 PROCEDURE — 80048 BASIC METABOLIC PNL TOTAL CA: CPT

## 2024-10-27 PROCEDURE — 83735 ASSAY OF MAGNESIUM: CPT

## 2024-10-27 PROCEDURE — 36415 COLL VENOUS BLD VENIPUNCTURE: CPT

## 2024-10-27 PROCEDURE — 84100 ASSAY OF PHOSPHORUS: CPT

## 2024-10-27 PROCEDURE — 99222 1ST HOSP IP/OBS MODERATE 55: CPT

## 2024-10-27 RX ORDER — ACETAMINOPHEN 500 MG
1000 TABLET ORAL ONCE
Refills: 0 | Status: COMPLETED | OUTPATIENT
Start: 2024-10-27 | End: 2024-10-27

## 2024-10-27 RX ORDER — PIPERACILLIN AND TAZOBACTAM .5; 4 G/20ML; G/20ML
3.38 INJECTION, POWDER, LYOPHILIZED, FOR SOLUTION INTRAVENOUS ONCE
Refills: 0 | Status: COMPLETED | OUTPATIENT
Start: 2024-10-27 | End: 2024-10-27

## 2024-10-27 RX ORDER — DILTIAZEM HCL 5 MG/ML
1 VIAL (ML) INTRAVENOUS
Refills: 0 | DISCHARGE

## 2024-10-27 RX ORDER — SODIUM CHLORIDE 9 MG/ML
1000 INJECTION, SOLUTION INTRAMUSCULAR; INTRAVENOUS; SUBCUTANEOUS
Refills: 0 | Status: DISCONTINUED | OUTPATIENT
Start: 2024-10-27 | End: 2024-10-29

## 2024-10-27 RX ORDER — ENOXAPARIN SODIUM 40MG/0.4ML
40 SYRINGE (ML) SUBCUTANEOUS EVERY 24 HOURS
Refills: 0 | Status: DISCONTINUED | OUTPATIENT
Start: 2024-10-27 | End: 2024-10-29

## 2024-10-27 RX ORDER — ONDANSETRON HYDROCHLORIDE 2 MG/ML
4 INJECTION, SOLUTION INTRAMUSCULAR; INTRAVENOUS EVERY 6 HOURS
Refills: 0 | Status: DISCONTINUED | OUTPATIENT
Start: 2024-10-27 | End: 2024-10-30

## 2024-10-27 RX ORDER — PIPERACILLIN AND TAZOBACTAM .5; 4 G/20ML; G/20ML
3.38 INJECTION, POWDER, LYOPHILIZED, FOR SOLUTION INTRAVENOUS EVERY 8 HOURS
Refills: 0 | Status: DISCONTINUED | OUTPATIENT
Start: 2024-10-27 | End: 2024-10-30

## 2024-10-27 RX ORDER — MORPHINE SULFATE 30 MG/1
2 TABLET, EXTENDED RELEASE ORAL EVERY 4 HOURS
Refills: 0 | Status: DISCONTINUED | OUTPATIENT
Start: 2024-10-27 | End: 2024-10-30

## 2024-10-27 RX ORDER — METOPROLOL TARTRATE 50 MG
25 TABLET ORAL DAILY
Refills: 0 | Status: DISCONTINUED | OUTPATIENT
Start: 2024-10-27 | End: 2024-10-30

## 2024-10-27 RX ORDER — HYDROMORPHONE HCL/0.9% NACL/PF 6 MG/30 ML
1 PATIENT CONTROLLED ANALGESIA SYRINGE INTRAVENOUS EVERY 4 HOURS
Refills: 0 | Status: DISCONTINUED | OUTPATIENT
Start: 2024-10-27 | End: 2024-10-30

## 2024-10-27 RX ORDER — ACETAMINOPHEN 500 MG
1000 TABLET ORAL ONCE
Refills: 0 | Status: DISCONTINUED | OUTPATIENT
Start: 2024-10-27 | End: 2024-10-30

## 2024-10-27 RX ORDER — ENOXAPARIN SODIUM 40MG/0.4ML
90 SYRINGE (ML) SUBCUTANEOUS EVERY 12 HOURS
Refills: 0 | Status: DISCONTINUED | OUTPATIENT
Start: 2024-10-27 | End: 2024-10-27

## 2024-10-27 RX ADMIN — PIPERACILLIN AND TAZOBACTAM 25 GRAM(S): .5; 4 INJECTION, POWDER, LYOPHILIZED, FOR SOLUTION INTRAVENOUS at 23:14

## 2024-10-27 RX ADMIN — SODIUM CHLORIDE 125 MILLILITER(S): 9 INJECTION, SOLUTION INTRAMUSCULAR; INTRAVENOUS; SUBCUTANEOUS at 07:18

## 2024-10-27 RX ADMIN — SODIUM CHLORIDE 125 MILLILITER(S): 9 INJECTION, SOLUTION INTRAMUSCULAR; INTRAVENOUS; SUBCUTANEOUS at 18:59

## 2024-10-27 RX ADMIN — MORPHINE SULFATE 2 MILLIGRAM(S): 30 TABLET, EXTENDED RELEASE ORAL at 23:15

## 2024-10-27 RX ADMIN — Medication 25 MILLIGRAM(S): at 11:40

## 2024-10-27 RX ADMIN — PIPERACILLIN AND TAZOBACTAM 200 GRAM(S): .5; 4 INJECTION, POWDER, LYOPHILIZED, FOR SOLUTION INTRAVENOUS at 06:23

## 2024-10-27 RX ADMIN — PIPERACILLIN AND TAZOBACTAM 25 GRAM(S): .5; 4 INJECTION, POWDER, LYOPHILIZED, FOR SOLUTION INTRAVENOUS at 14:44

## 2024-10-27 NOTE — H&P ADULT - NSHPPHYSICALEXAM_GEN_ALL_CORE
T(C): 36.8 (10-27-24 @ 07:17), Max: 37.7 (10-27-24 @ 01:26)  HR: 82 (10-27-24 @ 07:17) (82 - 95)  BP: 157/80 (10-27-24 @ 07:17) (157/80 - 180/97)  RR: 18 (10-27-24 @ 07:17) (18 - 19)  SpO2: 97% (10-27-24 @ 07:17) (97% - 99%)    CONSTITUTIONAL: Well groomed, no apparent distress, obese  EYES: PERRLA and symmetric, EOMI, No conjunctival or scleral injection, non-icteric  RESP: No respiratory distress, no use of accessory muscles  CV: NSR, hypertensive  GI: Soft, tender to suprapubic and LLQ region, ND, no rebound, no guarding; no palpable masses  MSK: Normal gait;  Normal ROM without pain, no spinal tenderness, normal muscle strength/tone  SKIN: No rashes or ulcers noted; no subcutaneous nodules or induration palpable  NEURO: CN II-XII intact; normal reflexes in upper and lower extremities, sensation intact in upper and lower extremities b/l to light touch   PSYCH: Appropriate insight/judgment; A+O x 3, mood and affect appropriate, recent/remote memory intact

## 2024-10-27 NOTE — PHARMACOTHERAPY INTERVENTION NOTE - INTERVENTION TYPE MED REC
Chief complaint:  Chief Complaint   Patient presents with   • Office Visit   • Lesion     face     HPI:   The patient is a established 66 year old male.  I last saw this patient on 9/21/2022 for an UBSE    The patient presents for the following lesion:  Location:  Face with scattered flat brown patches  Location:  Right upper chest with slightly raised red and itchy lesions x 2      ROS:  Cardiovascular:  The patient has a pacemaker:  No  The patient has a defibrillator:  No  Hematologic:  The patient bleeds easily because of being on aspirin or an anticoagulant:  No       The patient has a history of immunosuppression (transplant, HIV, leukemia, lymphoma, immunosuppressant medications): No  The patient has a history of chemotherapy and/or radiation: No      ALLERGIES:  No Known Allergies    Current Outpatient Medications   Medication Sig Dispense Refill   • sildenafil (REVATIO) 20 MG tablet Take 2-5 tabs as needed for erectile symptoms (max of 5 tabs/ 24 hrs) 30 tablet 3     No current facility-administered medications for this visit.       Past Medical History:   Diagnosis Date   • Crohn's disease (CMD) 8/27/2014   • Irritable bowel syndrome 8/27/2014   • SAMUEL on CPAP 2/8/2016   • Psoriasis, guttate    • Rosacea    • Sleep apnea        DERMATOLOGY PMH:     Precancers     Biopsy date Cancer Type Subtype Location Treatment Treatment Date             FH:  The patient has a family history of melanoma:  No    SH:  Retired from [employer]    PE:    PHYSICAL EXAM:    Right upper chest erythematous annular patches    Pink, gritty papules  Right eyebrow x 1  Right temple x 3  Right cheek x 3  Left forehead x 1    Tan/brown flat papule with no concerning features on the back    Tan to brown stuck on papules/plaques back    Numerous tan to brown macules in areas of sun exposure back    No other significant findings on Upper Body Skin Exam including inspection of the head and face, eyelids, conjunctiva, lips,  eccrine and apocrine  glands, neck, chest, abdomen, back, right and left upper extremities, digits and nails.  The patient is well developed and well nourished.      Assessment and Plan:    1.  Nummular Eczema  - new problem  -discussed etiology of condition  -discussed anti irritant regimen with dry skin handout provided  -emphasis for this patient on the following: Czech spring soap, detergents with fragrance and dyes  - begin triamcinolone 0.1% cream applied to the affected area on the chest twice daily, use for 3 weeks with 1 week break, repeat as needed, avoid using on the face.  Lock in with moisturizer like Cerave, Cetaphil, Aveeno or Eucerin  - discussed side effects of topical steroids including atrophy, dyschromia, and striae development and importance of discontinuing its use if any of these develop  - recommend gentle cleansing, using Dove sensitive skin white bar soap, Cetaphil or CeraVe, avoid using wash clothes or lufas.  Only use hands and wash the dirty parts and allow the soap suds clean the rest of your body.  - recommend all free and clear laundry and fabric softener products, free from dyes and fragrances    2.  Actinic Keratoses x 8  -  The diagnosis was discussed.    -  I recommended destruction with liquid nitrogen and after a discussion of risks and benefits of liquid nitrogen treatment the patient gave oral consent for the same.  -  Therefore liquid nitrogen was used to destroy the lesion(s).  -  Wound care and expected healing process discussed.    3.  Inflamed Seborrheic Keratoses x 1  -  The diagnosis was discussed.    -  The patient's request for removal of the lesion(s) is warranted on medical grounds.    -  After a discussion of the risks and benefits of liquid nitrogen therapy, the patient gave oral consent for the same.    -  Therefore, the lesion(s) were destroyed with liquid nitrogen.    -  Wound care and expected healing process discussed.    4.  Seborrheic Keratoses  -  Reassurance of the benign  nature of these lesions and that there is no need to treat unless they become symptomatic.    5.  Compound Nevus  -  Reassurance of the benign nature of these lesions.  -  Monitor at home for any concerning changes.  -  RTC (return to clinic) if any concerning changes.    6.  Lentigines  -  Reassurance of the benign nature of these lesions  -  Counseled that these lesions occur in response to excessive sun exposure over time  -  RTC (return to clinic) if any lesions have any new or concerning changes  -  Counseled on daily sun precautions including the use of sunscreen SPF 30+ daily and reapplying at least every 2 hours.  Also discussed sun protective clothing including long sleeves and wide brimmed hats when anticipating sun exposure.  Avoid peak sun hours and seek shade from 10a-4p when possible.    RTC (return to clinic) as scheduled, sooner if needed    On 11/9/2023, IBertha CMA, scribed the services personally performed by Terry Prakash MD.    The documentation recorded by the scribe accurately and completely reflects the service(s) I personally performed and the decisions made by me.     Terry Prakash MD  [facility]     Med Rec - Admission

## 2024-10-27 NOTE — ED PROVIDER NOTE - OBJECTIVE STATEMENT
79-year-old female with history of A-fib on Eliquis, hypertension presents ER complaining of abdominal pain.  Patient states that she has been unable to use the bathroom for the past several days.  Yesterday began having abdominal pain.  Associated with nausea.  Pain is throughout the entire abdomen.  Denies vomiting, diarrhea, fever, chills.  Endorses passing gas normally.

## 2024-10-27 NOTE — H&P ADULT - HISTORY OF PRESENT ILLNESS
77 yo F  with history of afib on eliquis, htn, hld presented to ED with abdominal pain. Pt states that this abd pain started yesterday and has progressively gotten worse. Denies ever having a colonoscope, or having any episode of diverticulitis beforehand. Denies any fever/chills, chest pain or SOB.

## 2024-10-27 NOTE — H&P ADULT - ATTENDING COMMENTS
Patient seen and examined in the ER  Acute onset LLQ pain  Reports improvement since presenting to the ED  Abdomen soft, mild to moderate LLQ tenderness, no guarding or rebound tenderness  CT findings discussed  A/P: Conservative management for acute diverticulitis. NPO, IV fluids, IV antibiotics, supportive care    Vital Signs Last 24 Hrs  T(C): 36.5 (27 Oct 2024 14:35), Max: 37.7 (27 Oct 2024 01:26)  T(F): 97.7 (27 Oct 2024 14:35), Max: 99.8 (27 Oct 2024 01:26)  HR: 85 (27 Oct 2024 14:35) (77 - 95)  BP: 146/83 (27 Oct 2024 14:35) (146/83 - 180/97)  BP(mean): 102 (27 Oct 2024 14:35) (88 - 117)  RR: 18 (27 Oct 2024 14:35) (18 - 19)  SpO2: 100% (27 Oct 2024 14:35) (97% - 100%)    Parameters below as of 27 Oct 2024 14:35  Patient On (Oxygen Delivery Method): room air                          12.1   9.77  )-----------( 176      ( 27 Oct 2024 03:53 )             36.3       BOWEL: No bowel obstruction. Appendix is normal. Acute diverticulitis of the distal sigmoid colon with small foci of free air consistent with microperforation. No phlegmon or abscess at this time.  PERITONEUM/RETROPERITONEUM: Within normal limits.  VESSELS: Atherosclerotic changes.  LYMPH NODES: No lymphadenopathy.  ABDOMINAL WALL: Within normal limits.  BONES: Degenerative changes. Left hip replacement.    IMPRESSION:    Acute diverticulitis of the distal sigmoid colon with small foci of free air consistent with microperforation. No phlegmon or abscess at this time.

## 2024-10-27 NOTE — ED ADULT NURSE NOTE - OBJECTIVE STATEMENT
Pt presents to the ED with c/o abdominal pain which began a day ago and has been worsening since. Pt denies N/V/D/fever. Last BM 3 days ago.

## 2024-10-27 NOTE — H&P ADULT - ASSESSMENT
78 yo F with acute diverticulitis and microperforation    Plan  Admit to med/surg  NPO  IV abx  IVF  CHERYL  Pain control PRN  Antiemetic PRN   Dvt ppx  Hospitalist for comt    d/w Dr Perez

## 2024-10-27 NOTE — ED ADULT NURSE REASSESSMENT NOTE - NS ED NURSE REASSESS COMMENT FT1
Patient ambulated to the bathroom independently, back in stretcher at this time, IV fluids infusing, patient refused to change into gown at this time as she states she is cold and comfortable in clothes.

## 2024-10-27 NOTE — ED ADULT NURSE REASSESSMENT NOTE - NS ED NURSE REASSESS COMMENT FT1
Received pt, A/Ox4 NPO at this time, awaiting surgery consult. IV fluids running at 125ml/hr. No c/o pain at this time, abdomen tender. Ambulatory to bathroom. Plan of care discussed with pt, verbalized understanding. Received pt, A/Ox4 NPO at this time. IV fluids running at 125ml/hr. No c/o pain at this time, abdomen tender. Ambulatory to bathroom. Plan of care discussed with pt, verbalized understanding.

## 2024-10-27 NOTE — ED ADULT NURSE REASSESSMENT NOTE - NS ED NURSE REASSESS COMMENT FT1
Resumed care from ERICA Hamilton. EKG requested to be done, IV Fluids initiated at this time, call bell placed within reach, plan of care discussed with patient regarding admission, patient to remain NPO.

## 2024-10-27 NOTE — ED ADULT TRIAGE NOTE - CHIEF COMPLAINT QUOTE
Pt c/o generalized abdominal pain x1 day, worsening today. Pt endorsing nausea. Pt denies vomiting, diarrhea, urinary symptoms, fever/chills. No meds PTA. Last BM 3 days ago.

## 2024-10-27 NOTE — ED PROVIDER NOTE - PHYSICAL EXAMINATION
Const: Well appearing, NAD  Eyes: PERRL, EOM intact  CV: RRR, no murmurs, no chest wall tenderness, distal pulses intact  Resp: CTAB, normal resp effort  GI: Diffusely tedner to palpation. soft, nondistended. No CVA tenderness  MSK: Full ROM, no muscle or bony deformity or tenderness  Neuro: AOx3, GCS 15, No focal deficits  Skin: No rash, laceration or abrasion  Psych: calm, cooperative.

## 2024-10-27 NOTE — H&P ADULT - NSHPLABSRESULTS_GEN_ALL_CORE
12.1   9.77  )-----------( 176      ( 27 Oct 2024 03:53 )             36.3   10-27    137  |  107  |  14  ----------------------------<  159[H]  3.8   |  24  |  0.66    Ca    9.0      27 Oct 2024 03:53    TPro  7.1  /  Alb  3.4  /  TBili  1.0  /  DBili  x   /  AST  11[L]  /  ALT  16  /  AlkPhos  89  10-27  Radio:  LOWER CHEST: Within normal limits.    LIVER: Small hepatic cysts and hypodensities too small to characterize.   Subcentimeter enhancing nodule in the dome of the liver unchanged.  BILE DUCTS: Normal caliber.  GALLBLADDER: Within normal limits.  SPLEEN: Within normal limits.  PANCREAS: Within normal limits.  ADRENALS: Mild thickening of the left adrenal gland.  KIDNEYS/URETERS: Normal right renal cyst.    BLADDER: Within normal limits.  REPRODUCTIVE ORGANS:Uterus and adnexa within normal limits.    BOWEL: No bowel obstruction. Appendix is normal. Acute diverticulitis of   the distal sigmoid colon with small foci of free air consistent with   microperforation. No phlegmon or abscess at this time.  PERITONEUM/RETROPERITONEUM: Within normal limits.  VESSELS: Atherosclerotic changes.  LYMPH NODES: No lymphadenopathy.  ABDOMINAL WALL: Within normal limits.  BONES: Degenerative changes. Left hip replacement.

## 2024-10-27 NOTE — PATIENT PROFILE ADULT - FALL HARM RISK - RISK INTERVENTIONS

## 2024-10-27 NOTE — ED PROVIDER NOTE - CLINICAL SUMMARY MEDICAL DECISION MAKING FREE TEXT BOX
Patient presents to the ER for abdominal pain and constipation.  Patient is well-appearing, vitally stable.  Tender in all 4 quadrants on exam.  No rebound or guarding.  Labs drawn and have nonactionable findings.  CT scan showing acute diverticulitis with microperforations.  Surgery consulted. Patient presents to the ER for abdominal pain and constipation.  Patient is well-appearing, vitally stable.  Tender in all 4 quadrants on exam.  No rebound or guarding.  Labs drawn and have nonactionable findings.  CT scan showing acute diverticulitis with microperforations.  Surgery consulted. Admitted to surgery service in stable condition.

## 2024-10-28 ENCOUNTER — APPOINTMENT (OUTPATIENT)
Dept: FAMILY MEDICINE | Facility: CLINIC | Age: 79
End: 2024-10-28

## 2024-10-28 DIAGNOSIS — J44.9 CHRONIC OBSTRUCTIVE PULMONARY DISEASE, UNSPECIFIED: ICD-10-CM

## 2024-10-28 LAB
ANION GAP SERPL CALC-SCNC: 7 MMOL/L — SIGNIFICANT CHANGE UP (ref 5–17)
BASOPHILS # BLD AUTO: 0.02 K/UL — SIGNIFICANT CHANGE UP (ref 0–0.2)
BASOPHILS NFR BLD AUTO: 0.2 % — SIGNIFICANT CHANGE UP (ref 0–2)
BUN SERPL-MCNC: 10 MG/DL — SIGNIFICANT CHANGE UP (ref 7–23)
CALCIUM SERPL-MCNC: 8.7 MG/DL — SIGNIFICANT CHANGE UP (ref 8.5–10.1)
CHLORIDE SERPL-SCNC: 110 MMOL/L — HIGH (ref 96–108)
CO2 SERPL-SCNC: 24 MMOL/L — SIGNIFICANT CHANGE UP (ref 22–31)
CREAT SERPL-MCNC: 0.53 MG/DL — SIGNIFICANT CHANGE UP (ref 0.5–1.3)
EGFR: 93 ML/MIN/1.73M2 — SIGNIFICANT CHANGE UP
EOSINOPHIL # BLD AUTO: 0.07 K/UL — SIGNIFICANT CHANGE UP (ref 0–0.5)
EOSINOPHIL NFR BLD AUTO: 0.8 % — SIGNIFICANT CHANGE UP (ref 0–6)
GLUCOSE SERPL-MCNC: 110 MG/DL — HIGH (ref 70–99)
HCT VFR BLD CALC: 33.4 % — LOW (ref 34.5–45)
HGB BLD-MCNC: 10.9 G/DL — LOW (ref 11.5–15.5)
IMM GRANULOCYTES NFR BLD AUTO: 0.3 % — SIGNIFICANT CHANGE UP (ref 0–0.9)
LYMPHOCYTES # BLD AUTO: 1.63 K/UL — SIGNIFICANT CHANGE UP (ref 1–3.3)
LYMPHOCYTES # BLD AUTO: 19 % — SIGNIFICANT CHANGE UP (ref 13–44)
MAGNESIUM SERPL-MCNC: 2 MG/DL — SIGNIFICANT CHANGE UP (ref 1.6–2.6)
MCHC RBC-ENTMCNC: 29.1 PG — SIGNIFICANT CHANGE UP (ref 27–34)
MCHC RBC-ENTMCNC: 32.6 GM/DL — SIGNIFICANT CHANGE UP (ref 32–36)
MCV RBC AUTO: 89.3 FL — SIGNIFICANT CHANGE UP (ref 80–100)
MONOCYTES # BLD AUTO: 0.66 K/UL — SIGNIFICANT CHANGE UP (ref 0–0.9)
MONOCYTES NFR BLD AUTO: 7.7 % — SIGNIFICANT CHANGE UP (ref 2–14)
NEUTROPHILS # BLD AUTO: 6.19 K/UL — SIGNIFICANT CHANGE UP (ref 1.8–7.4)
NEUTROPHILS NFR BLD AUTO: 72 % — SIGNIFICANT CHANGE UP (ref 43–77)
PHOSPHATE SERPL-MCNC: 2.4 MG/DL — LOW (ref 2.5–4.5)
PLATELET # BLD AUTO: 170 K/UL — SIGNIFICANT CHANGE UP (ref 150–400)
POTASSIUM SERPL-MCNC: 3.4 MMOL/L — LOW (ref 3.5–5.3)
POTASSIUM SERPL-SCNC: 3.4 MMOL/L — LOW (ref 3.5–5.3)
RBC # BLD: 3.74 M/UL — LOW (ref 3.8–5.2)
RBC # FLD: 13.7 % — SIGNIFICANT CHANGE UP (ref 10.3–14.5)
SODIUM SERPL-SCNC: 141 MMOL/L — SIGNIFICANT CHANGE UP (ref 135–145)
WBC # BLD: 8.6 K/UL — SIGNIFICANT CHANGE UP (ref 3.8–10.5)
WBC # FLD AUTO: 8.6 K/UL — SIGNIFICANT CHANGE UP (ref 3.8–10.5)

## 2024-10-28 PROCEDURE — 99222 1ST HOSP IP/OBS MODERATE 55: CPT

## 2024-10-28 PROCEDURE — 99232 SBSQ HOSP IP/OBS MODERATE 35: CPT

## 2024-10-28 RX ORDER — DIBASIC SODIUM PHOSPHATE, MONOBASIC POTASSIUM PHOSPHATE AND MONOBASIC SODIUM PHOSPHATE 852; 155; 130 MG/1; MG/1; MG/1
1 TABLET ORAL ONCE
Refills: 0 | Status: COMPLETED | OUTPATIENT
Start: 2024-10-28 | End: 2024-10-28

## 2024-10-28 RX ORDER — DILTIAZEM HCL 5 MG/ML
120 VIAL (ML) INTRAVENOUS DAILY
Refills: 0 | Status: DISCONTINUED | OUTPATIENT
Start: 2024-10-28 | End: 2024-10-30

## 2024-10-28 RX ADMIN — SODIUM CHLORIDE 125 MILLILITER(S): 9 INJECTION, SOLUTION INTRAMUSCULAR; INTRAVENOUS; SUBCUTANEOUS at 05:18

## 2024-10-28 RX ADMIN — PIPERACILLIN AND TAZOBACTAM 25 GRAM(S): .5; 4 INJECTION, POWDER, LYOPHILIZED, FOR SOLUTION INTRAVENOUS at 14:45

## 2024-10-28 RX ADMIN — MORPHINE SULFATE 2 MILLIGRAM(S): 30 TABLET, EXTENDED RELEASE ORAL at 00:00

## 2024-10-28 RX ADMIN — Medication 120 MILLIGRAM(S): at 11:04

## 2024-10-28 RX ADMIN — Medication 25 MILLIGRAM(S): at 11:04

## 2024-10-28 RX ADMIN — PIPERACILLIN AND TAZOBACTAM 25 GRAM(S): .5; 4 INJECTION, POWDER, LYOPHILIZED, FOR SOLUTION INTRAVENOUS at 21:20

## 2024-10-28 RX ADMIN — DIBASIC SODIUM PHOSPHATE, MONOBASIC POTASSIUM PHOSPHATE AND MONOBASIC SODIUM PHOSPHATE 1 PACKET(S): 852; 155; 130 TABLET ORAL at 12:12

## 2024-10-28 RX ADMIN — Medication 20 MILLIGRAM(S): at 21:21

## 2024-10-28 RX ADMIN — PIPERACILLIN AND TAZOBACTAM 25 GRAM(S): .5; 4 INJECTION, POWDER, LYOPHILIZED, FOR SOLUTION INTRAVENOUS at 06:21

## 2024-10-28 NOTE — CONSULT NOTE ADULT - ASSESSMENT
79 yo F  with history of afib on eliquis, htn, hld presented to ED with abdominal pain. Pt states that this abd pain started yesterday and has progressively gotten worse. Denies ever having a colonoscope, or having any episode of diverticulitis beforehand. Denies any fever/chills, chest pain or SOB admitted for diverticulitis with microperforation    #diverticulitis with microperformation  patient being managed by colorectal surgery - patient afebrile with no leukocytosis  started on CLD today and given iv abx for diverticulitis with microperformation  -management per primary team, agree with iv abx and advancing diet as tolerated and pain management with dilaudid    #afib   patient rate controlled during admission  home medications include eliquis 5 dilt 120  toprol 25   -dilt being held at this time - patient remains rate controlled - restart today  -restart eliquis once confirmed no surgical intervention     #HTN  patients bp remained [id number]   -c/w toprol 25    #HLD  -c/w atorvastatin 20     #prophylactic measure  F: none  E; replete as needed  N: CLD  DVT ppx: lovenox --> switch to eliquis once no surgical intervention confirmed

## 2024-10-28 NOTE — CONSULT NOTE ADULT - SUBJECTIVE AND OBJECTIVE BOX
CONSULT NOTE - INTERNAL MEDICINE    KEYLA KEMP  [id number]  79y  Female      Patient is a 79y old  Female who presents with a chief complaint of Diverticulitis with microperf (28 Oct 2024 01:29)  Patient feels okay this morning - has an appetite to eat, minimal abdominal pain      PAST MEDICAL/SURGICAL HISTORY  PAST MEDICAL & SURGICAL HISTORY:  HTN (hypertension)      Afib      No significant past surgical history          REVIEW OF SYSTEMS:  CONSTITUTIONAL: No fever, weight loss, or fatigue  EYES: No eye pain, visual disturbances, or discharge  ENMT:  No difficulty hearing, tinnitus, vertigo; No sinus or throat pain  NECK: No pain or stiffness  BREASTS: No pain, masses, or nipple discharge  RESPIRATORY: No cough, wheezing, chills or hemoptysis; No shortness of breath  CARDIOVASCULAR: No chest pain, palpitations, dizziness, or leg swelling  GASTROINTESTINAL: No abdominal or epigastric pain. No nausea, vomiting, or hematemesis; No diarrhea or constipation. No melena or hematochezia.  GENITOURINARY: No dysuria, frequency, hematuria, or incontinence  NEUROLOGICAL: No headaches, memory loss, loss of strength, numbness, or tremors  SKIN: No itching, burning, rashes, or lesions   LYMPH NODES: No enlarged glands  ENDOCRINE: No heat or cold intolerance; No hair loss  MUSCULOSKELETAL: No joint pain or swelling; No muscle, back, or extremity pain  PSYCHIATRIC: No depression, anxiety, mood swings, or difficulty sleeping  HEME/LYMPH: No easy bruising, or bleeding gums  ALLERY AND IMMUNOLOGIC: No hives or eczema    T(C): 37.1 (10-28-24 @ 07:50), Max: 37.5 (10-27-24 @ 16:18)  HR: 79 (10-28-24 @ 07:50) (71 - 85)  BP: 158/73 (10-28-24 @ 07:50) (141/69 - 158/73)  RR: 18 (10-28-24 @ 07:50) (18 - 18)  SpO2: 96% (10-28-24 @ 07:50) (95% - 100%)  Wt(kg): --Vital Signs Last 24 Hrs  T(C): 37.1 (28 Oct 2024 07:50), Max: 37.5 (27 Oct 2024 16:18)  T(F): 98.8 (28 Oct 2024 07:50), Max: 99.5 (27 Oct 2024 16:18)  HR: 79 (28 Oct 2024 07:50) (71 - 85)  BP: 158/73 (28 Oct 2024 07:50) (141/69 - 158/73)  BP(mean): 102 (27 Oct 2024 14:35) (88 - 102)  RR: 18 (28 Oct 2024 07:50) (18 - 18)  SpO2: 96% (28 Oct 2024 07:50) (95% - 100%)    Parameters below as of 28 Oct 2024 07:50  Patient On (Oxygen Delivery Method): room air        PHYSICAL EXAM:  GENERAL: NAD, well-groomed, well-developed  HEAD:  Atraumatic, Normocephalic  EYES: EOMI, PERRLA, conjunctiva and sclera clear  ENMT: No tonsillar erythema, exudates, or enlargement; Moist mucous membranes, Good dentition, No lesions  NECK: Supple, No JVD, Normal thyroid  NERVOUS SYSTEM:  Alert & Oriented X3, Good concentration; Motor Strength 5/5 B/L upper and lower extremities; DTRs 2+ intact and symmetric  CHEST/LUNG: Clear to percussion bilaterally; No rales, rhonchi, wheezing, or rubs  HEART: Regular rate and rhythm; No murmurs, rubs, or gallops  ABDOMEN: Soft, Nontender, Nondistended; Bowel sounds present  EXTREMITIES:  2+ Peripheral Pulses, No clubbing, cyanosis, or edema  LYMPH: No lymphadenopathy noted  SKIN: No rashes or lesions    Consultant(s) Notes Reviewed:  [x ] YES  [ ] NO  Care Discussed with Consultants/Other Providers [ x] YES  [ ] NO    LABS:                        10.9   8.60  )-----------( 170      ( 28 Oct 2024 06:37 )             33.4     10-28    141  |  110[H]  |  10  ----------------------------<  110[H]  3.4[L]   |  24  |  0.53    Ca    8.7      28 Oct 2024 06:37  Phos  2.4     10-28  Mg     2.0     10-28    TPro  7.1  /  Alb  3.4  /  TBili  1.0  /  DBili  x   /  AST  11[L]  /  ALT  16  /  AlkPhos  89  10-27      Urinalysis Basic - ( 28 Oct 2024 06:37 )    Color: x / Appearance: x / SG: x / pH: x  Gluc: 110 mg/dL / Ketone: x  / Bili: x / Urobili: x   Blood: x / Protein: x / Nitrite: x   Leuk Esterase: x / RBC: x / WBC x   Sq Epi: x / Non Sq Epi: x / Bacteria: x      LIVER FUNCTIONS - ( 27 Oct 2024 03:53 )  Alb: 3.4 g/dL / Pro: 7.1 gm/dL / ALK PHOS: 89 U/L / ALT: 16 U/L / AST: 11 U/L / GGT: x                 RADIOLOGY & ADDITIONAL TESTS:    Imaging Personally Reviewed:  [ ] YES  [ ] NO

## 2024-10-29 LAB
ANION GAP SERPL CALC-SCNC: 3 MMOL/L — LOW (ref 5–17)
BASOPHILS # BLD AUTO: 0.03 K/UL — SIGNIFICANT CHANGE UP (ref 0–0.2)
BASOPHILS NFR BLD AUTO: 0.5 % — SIGNIFICANT CHANGE UP (ref 0–2)
BUN SERPL-MCNC: 6 MG/DL — LOW (ref 7–23)
CALCIUM SERPL-MCNC: 8.6 MG/DL — SIGNIFICANT CHANGE UP (ref 8.5–10.1)
CHLORIDE SERPL-SCNC: 112 MMOL/L — HIGH (ref 96–108)
CO2 SERPL-SCNC: 27 MMOL/L — SIGNIFICANT CHANGE UP (ref 22–31)
CREAT SERPL-MCNC: 0.6 MG/DL — SIGNIFICANT CHANGE UP (ref 0.5–1.3)
EGFR: 90 ML/MIN/1.73M2 — SIGNIFICANT CHANGE UP
EOSINOPHIL # BLD AUTO: 0.1 K/UL — SIGNIFICANT CHANGE UP (ref 0–0.5)
EOSINOPHIL NFR BLD AUTO: 1.6 % — SIGNIFICANT CHANGE UP (ref 0–6)
GLUCOSE SERPL-MCNC: 121 MG/DL — HIGH (ref 70–99)
HCT VFR BLD CALC: 31.7 % — LOW (ref 34.5–45)
HGB BLD-MCNC: 10.4 G/DL — LOW (ref 11.5–15.5)
IMM GRANULOCYTES NFR BLD AUTO: 0.6 % — SIGNIFICANT CHANGE UP (ref 0–0.9)
LYMPHOCYTES # BLD AUTO: 1.55 K/UL — SIGNIFICANT CHANGE UP (ref 1–3.3)
LYMPHOCYTES # BLD AUTO: 24.8 % — SIGNIFICANT CHANGE UP (ref 13–44)
MAGNESIUM SERPL-MCNC: 2 MG/DL — SIGNIFICANT CHANGE UP (ref 1.6–2.6)
MCHC RBC-ENTMCNC: 29.4 PG — SIGNIFICANT CHANGE UP (ref 27–34)
MCHC RBC-ENTMCNC: 32.8 GM/DL — SIGNIFICANT CHANGE UP (ref 32–36)
MCV RBC AUTO: 89.5 FL — SIGNIFICANT CHANGE UP (ref 80–100)
MONOCYTES # BLD AUTO: 0.48 K/UL — SIGNIFICANT CHANGE UP (ref 0–0.9)
MONOCYTES NFR BLD AUTO: 7.7 % — SIGNIFICANT CHANGE UP (ref 2–14)
NEUTROPHILS # BLD AUTO: 4.04 K/UL — SIGNIFICANT CHANGE UP (ref 1.8–7.4)
NEUTROPHILS NFR BLD AUTO: 64.8 % — SIGNIFICANT CHANGE UP (ref 43–77)
PHOSPHATE SERPL-MCNC: 2.7 MG/DL — SIGNIFICANT CHANGE UP (ref 2.5–4.5)
PLATELET # BLD AUTO: 179 K/UL — SIGNIFICANT CHANGE UP (ref 150–400)
POTASSIUM SERPL-MCNC: 3.4 MMOL/L — LOW (ref 3.5–5.3)
POTASSIUM SERPL-SCNC: 3.4 MMOL/L — LOW (ref 3.5–5.3)
RBC # BLD: 3.54 M/UL — LOW (ref 3.8–5.2)
RBC # FLD: 13.7 % — SIGNIFICANT CHANGE UP (ref 10.3–14.5)
SODIUM SERPL-SCNC: 142 MMOL/L — SIGNIFICANT CHANGE UP (ref 135–145)
WBC # BLD: 6.24 K/UL — SIGNIFICANT CHANGE UP (ref 3.8–10.5)
WBC # FLD AUTO: 6.24 K/UL — SIGNIFICANT CHANGE UP (ref 3.8–10.5)

## 2024-10-29 PROCEDURE — 99232 SBSQ HOSP IP/OBS MODERATE 35: CPT

## 2024-10-29 PROCEDURE — 99233 SBSQ HOSP IP/OBS HIGH 50: CPT

## 2024-10-29 RX ORDER — POTASSIUM CHLORIDE 10 MEQ
20 TABLET, EXTENDED RELEASE ORAL
Refills: 0 | Status: COMPLETED | OUTPATIENT
Start: 2024-10-29 | End: 2024-10-29

## 2024-10-29 RX ORDER — APIXABAN 5 MG/1
5 TABLET, FILM COATED ORAL EVERY 12 HOURS
Refills: 0 | Status: DISCONTINUED | OUTPATIENT
Start: 2024-10-29 | End: 2024-10-30

## 2024-10-29 RX ORDER — DIBASIC SODIUM PHOSPHATE, MONOBASIC POTASSIUM PHOSPHATE AND MONOBASIC SODIUM PHOSPHATE 852; 155; 130 MG/1; MG/1; MG/1
1 TABLET ORAL ONCE
Refills: 0 | Status: COMPLETED | OUTPATIENT
Start: 2024-10-29 | End: 2024-10-29

## 2024-10-29 RX ADMIN — PIPERACILLIN AND TAZOBACTAM 25 GRAM(S): .5; 4 INJECTION, POWDER, LYOPHILIZED, FOR SOLUTION INTRAVENOUS at 05:33

## 2024-10-29 RX ADMIN — Medication 20 MILLIGRAM(S): at 22:08

## 2024-10-29 RX ADMIN — Medication 20 MILLIEQUIVALENT(S): at 10:18

## 2024-10-29 RX ADMIN — PIPERACILLIN AND TAZOBACTAM 25 GRAM(S): .5; 4 INJECTION, POWDER, LYOPHILIZED, FOR SOLUTION INTRAVENOUS at 22:09

## 2024-10-29 RX ADMIN — Medication 20 MILLIEQUIVALENT(S): at 15:20

## 2024-10-29 RX ADMIN — APIXABAN 5 MILLIGRAM(S): 5 TABLET, FILM COATED ORAL at 22:08

## 2024-10-29 RX ADMIN — Medication 20 MILLIEQUIVALENT(S): at 12:54

## 2024-10-29 RX ADMIN — Medication 120 MILLIGRAM(S): at 10:15

## 2024-10-29 RX ADMIN — PIPERACILLIN AND TAZOBACTAM 25 GRAM(S): .5; 4 INJECTION, POWDER, LYOPHILIZED, FOR SOLUTION INTRAVENOUS at 15:20

## 2024-10-29 RX ADMIN — Medication 25 MILLIGRAM(S): at 10:15

## 2024-10-29 RX ADMIN — DIBASIC SODIUM PHOSPHATE, MONOBASIC POTASSIUM PHOSPHATE AND MONOBASIC SODIUM PHOSPHATE 1 PACKET(S): 852; 155; 130 TABLET ORAL at 13:03

## 2024-10-29 RX ADMIN — SODIUM CHLORIDE 125 MILLILITER(S): 9 INJECTION, SOLUTION INTRAMUSCULAR; INTRAVENOUS; SUBCUTANEOUS at 05:33

## 2024-10-30 ENCOUNTER — TRANSCRIPTION ENCOUNTER (OUTPATIENT)
Age: 79
End: 2024-10-30

## 2024-10-30 VITALS
OXYGEN SATURATION: 95 % | DIASTOLIC BLOOD PRESSURE: 54 MMHG | HEART RATE: 64 BPM | RESPIRATION RATE: 18 BRPM | SYSTOLIC BLOOD PRESSURE: 148 MMHG | TEMPERATURE: 98 F

## 2024-10-30 LAB
ANION GAP SERPL CALC-SCNC: 5 MMOL/L — SIGNIFICANT CHANGE UP (ref 5–17)
BASOPHILS # BLD AUTO: 0 K/UL — SIGNIFICANT CHANGE UP (ref 0–0.2)
BASOPHILS NFR BLD AUTO: 0 % — SIGNIFICANT CHANGE UP (ref 0–2)
BUN SERPL-MCNC: 5 MG/DL — LOW (ref 7–23)
CALCIUM SERPL-MCNC: 9 MG/DL — SIGNIFICANT CHANGE UP (ref 8.5–10.1)
CHLORIDE SERPL-SCNC: 111 MMOL/L — HIGH (ref 96–108)
CO2 SERPL-SCNC: 25 MMOL/L — SIGNIFICANT CHANGE UP (ref 22–31)
CREAT SERPL-MCNC: 0.53 MG/DL — SIGNIFICANT CHANGE UP (ref 0.5–1.3)
EGFR: 93 ML/MIN/1.73M2 — SIGNIFICANT CHANGE UP
EOSINOPHIL # BLD AUTO: 0.24 K/UL — SIGNIFICANT CHANGE UP (ref 0–0.5)
EOSINOPHIL NFR BLD AUTO: 4 % — SIGNIFICANT CHANGE UP (ref 0–6)
GLUCOSE SERPL-MCNC: 139 MG/DL — HIGH (ref 70–99)
HCT VFR BLD CALC: 32.4 % — LOW (ref 34.5–45)
HGB BLD-MCNC: 10.7 G/DL — LOW (ref 11.5–15.5)
LYMPHOCYTES # BLD AUTO: 1.24 K/UL — SIGNIFICANT CHANGE UP (ref 1–3.3)
LYMPHOCYTES # BLD AUTO: 21 % — SIGNIFICANT CHANGE UP (ref 13–44)
MAGNESIUM SERPL-MCNC: 1.9 MG/DL — SIGNIFICANT CHANGE UP (ref 1.6–2.6)
MCHC RBC-ENTMCNC: 29.5 PG — SIGNIFICANT CHANGE UP (ref 27–34)
MCHC RBC-ENTMCNC: 33 G/DL — SIGNIFICANT CHANGE UP (ref 32–36)
MCV RBC AUTO: 89.3 FL — SIGNIFICANT CHANGE UP (ref 80–100)
MONOCYTES # BLD AUTO: 0.47 K/UL — SIGNIFICANT CHANGE UP (ref 0–0.9)
MONOCYTES NFR BLD AUTO: 8 % — SIGNIFICANT CHANGE UP (ref 2–14)
NEUTROPHILS # BLD AUTO: 3.96 K/UL — SIGNIFICANT CHANGE UP (ref 1.8–7.4)
NEUTROPHILS NFR BLD AUTO: 67 % — SIGNIFICANT CHANGE UP (ref 43–77)
NRBC # BLD: SIGNIFICANT CHANGE UP /100 WBCS (ref 0–0)
PHOSPHATE SERPL-MCNC: 3.5 MG/DL — SIGNIFICANT CHANGE UP (ref 2.5–4.5)
PLATELET # BLD AUTO: 201 K/UL — SIGNIFICANT CHANGE UP (ref 150–400)
POTASSIUM SERPL-MCNC: 3.6 MMOL/L — SIGNIFICANT CHANGE UP (ref 3.5–5.3)
POTASSIUM SERPL-SCNC: 3.6 MMOL/L — SIGNIFICANT CHANGE UP (ref 3.5–5.3)
RBC # BLD: 3.63 M/UL — LOW (ref 3.8–5.2)
RBC # FLD: 13.5 % — SIGNIFICANT CHANGE UP (ref 10.3–14.5)
SODIUM SERPL-SCNC: 141 MMOL/L — SIGNIFICANT CHANGE UP (ref 135–145)
WBC # BLD: 5.91 K/UL — SIGNIFICANT CHANGE UP (ref 3.8–10.5)
WBC # FLD AUTO: 5.91 K/UL — SIGNIFICANT CHANGE UP (ref 3.8–10.5)

## 2024-10-30 PROCEDURE — 99232 SBSQ HOSP IP/OBS MODERATE 35: CPT

## 2024-10-30 RX ORDER — AMOXICILLIN AND CLAVULANATE POTASSIUM 600; 42.9 MG/5ML; MG/5ML
1 POWDER, FOR SUSPENSION ORAL
Qty: 28 | Refills: 0
Start: 2024-10-30 | End: 2024-11-12

## 2024-10-30 RX ORDER — POTASSIUM CHLORIDE 10 MEQ
40 TABLET, EXTENDED RELEASE ORAL ONCE
Refills: 0 | Status: DISCONTINUED | OUTPATIENT
Start: 2024-10-30 | End: 2024-10-30

## 2024-10-30 RX ADMIN — Medication 25 MILLIGRAM(S): at 09:44

## 2024-10-30 RX ADMIN — APIXABAN 5 MILLIGRAM(S): 5 TABLET, FILM COATED ORAL at 09:44

## 2024-10-30 RX ADMIN — PIPERACILLIN AND TAZOBACTAM 25 GRAM(S): .5; 4 INJECTION, POWDER, LYOPHILIZED, FOR SOLUTION INTRAVENOUS at 05:17

## 2024-10-30 RX ADMIN — Medication 120 MILLIGRAM(S): at 09:44

## 2024-10-30 NOTE — DISCHARGE NOTE PROVIDER - CARE PROVIDER_API CALL
Michael Peerz.  Colon/Rectal Surgery  [address] [place] [address]  Phone: [phone]  Fax: [phone]  Follow Up Time: 2 weeks

## 2024-10-30 NOTE — PROGRESS NOTE ADULT - ASSESSMENT
80 yo F with acute diverticulitis and microperforation  wbc 9  doing well    Plan  start CLD am  IV abx  IVF  CHERYL  Pain control PRN  Antiemetic PRN   Dvt ppx  Hospitalist for comgmt    d/w attending   
78 yo F with acute diverticulitis and microperforation, responsive to IV abx, stable    Plan  Adv to LRD this AM  f/u AM labs  IV abx  CHERYL  Pain control PRN  Antiemetic PRN   Eliquis restarted  Hospitalist for comgmt  Dispo today after diet on PO abx    Will d/w CRS team 
80 yo F with acute diverticulitis and microperforation, responsive to IV abx, stable    Plan  Pending AM CBC & exam, ADAT from CLD  IV abx  IVF--HL w/ adequate PO  CHERYL  Pain control PRN  Antiemetic PRN   VTE ppx; home Eliquis on hold  Hospitalist for comgmt    Will d/w CRS team & update accordingly
77 yo F  with history of afib on eliquis, htn, hld presented to ED with abdominal pain. Pt states that this abd pain started yesterday and has progressively gotten worse. Denies ever having a colonoscope, or having any episode of diverticulitis beforehand. Denies any fever/chills, chest pain or SOB admitted for diverticulitis with microperforation    #diverticulitis with microperformation  patient being managed by colorectal surgery - patient afebrile with no leukocytosis  tolerating regular diet    -management per primary team, agree with iv abx and advancing diet as tolerated and pain management    #afib   patient rate controlled during admission  home medications include eliquis 5 dilt 120  toprol 25     #HTN  patients bp remained [id number]   -c/w toprol 25, diltiazem     #HLD  -c/w atorvastatin 20       DISPO: d/c today     
77 yo F  with history of afib on eliquis, htn, hld presented to ED with abdominal pain. Pt states that this abd pain started yesterday and has progressively gotten worse. Denies ever having a colonoscope, or having any episode of diverticulitis beforehand. Denies any fever/chills, chest pain or SOB admitted for diverticulitis with microperforation    #diverticulitis with microperformation  patient being managed by colorectal surgery - patient afebrile with no leukocytosis  started on CLD yesterday and on iv abx - diet being advanced today   -management per primary team, agree with iv abx and advancing diet as tolerated and pain management    #afib   patient rate controlled during admission  home medications include eliquis 5 dilt 120  toprol 25   -dilt restarted yesterday, eliquis restarted today as no current plan for surgery    #HTN  patients bp remained [id number]   -c/w toprol 25    #HLD  -c/w atorvastatin 20     #prophylactic measure  F: none  E; replete as needed  N: CLD --> FLD   DVT ppx: eliquis

## 2024-10-30 NOTE — PROGRESS NOTE ADULT - TIME BILLING
I spent a total of 35 minutes on the date of this encounter coordinating the patient's care. This includes reviewing prior documentation, results and imaging in addition to completing a full history and physical examination on the patient. Further tests, medications, and procedures have been ordered as indicated. Laboratory results and the plan of care were communicated to the patient and/or their family member. Supporting documentation was completed and added to the patient's chart.
Patient seen and examined, chart reviewed.  She reports that her pain is essentially resolved.  No N/V.  Passing flatus.  On exam abdomen is soft, nondistended, minimally tender LLQ deep palpation only without rebound/guarding.  No leukocytosis - WBC 8.6.      -- Clear liquid diet, monitor tolerance  -- IV antibiotics  -- Serial abdominal exams  -- Recheck labs in AM

## 2024-10-30 NOTE — DISCHARGE NOTE PROVIDER - NSDCMRMEDTOKEN_GEN_ALL_CORE_FT
amoxicillin-clavulanate 875 mg-125 mg oral tablet: 1 cap(s) orally every 12 hours  apixaban 5 mg oral tablet: 1 tab(s) orally 2 times a day  atorvastatin 10 mg oral tablet: 1 tab(s) orally once a day  dilTIAZem 120 mg/24 hours oral tablet, extended release: 1 tab(s) orally once a day  metoprolol succinate 25 mg oral tablet, extended release: 1 tab(s) orally once a day

## 2024-10-30 NOTE — DISCHARGE NOTE NURSING/CASE MANAGEMENT/SOCIAL WORK - FINANCIAL ASSISTANCE
[facility] provides services at a reduced cost to those who are determined to be eligible through [facility]’s financial assistance program. Information regarding [facility]’s financial assistance program can be found by going to [email or web address] or by calling [phone].

## 2024-10-30 NOTE — PROGRESS NOTE ADULT - REASON FOR ADMISSION
Diverticulitis with microperf

## 2024-10-30 NOTE — PROGRESS NOTE ADULT - ATTENDING COMMENTS
Plan for discharge home today with PO antibiotics. Office follow up in 2 weeks.
Patient seen and examined at bedside this morning  She is doing well, tolerating clears and advanced to full liquids  Abdomen is soft, ND, mildly tender in the LLQ, no guarding or rebound tenderness  A/P: Plan to advance to low fiber for breakfast and discharge home on PO antibiotics once tolerating.     Vital Signs Last 24 Hrs  T(C): 36.9 (29 Oct 2024 15:35), Max: 36.9 (29 Oct 2024 15:35)  T(F): 98.4 (29 Oct 2024 15:35), Max: 98.4 (29 Oct 2024 15:35)  HR: 67 (29 Oct 2024 15:35) (67 - 75)  BP: 161/68 (29 Oct 2024 15:35) (142/53 - 161/77)  BP(mean): 100 (29 Oct 2024 08:34) (100 - 100)  RR: 18 (29 Oct 2024 15:35) (17 - 18)  SpO2: 99% (29 Oct 2024 15:35) (98% - 99%)    Parameters below as of 29 Oct 2024 15:35  Patient On (Oxygen Delivery Method): room air                          10.4   6.24  )-----------( 179      ( 29 Oct 2024 07:31 )             31.7

## 2024-10-30 NOTE — DISCHARGE NOTE PROVIDER - HOSPITAL COURSE
80 yo F  with history of afib on eliquis, htn, hld presented to ED with abdominal pain. Pt states that this abd pain started couple of days previous and has progressively gotten worse. Denies ever having a colonoscope, or having any episode of diverticulitis beforehand. Denies any fever/chills, chest pain or SOB. Treated conservatively in house, advanced diet as tolerated, given IV abx. Stable for DC if tolerated LRD, outpt abx script sent.

## 2024-10-30 NOTE — PROGRESS NOTE ADULT - SUBJECTIVE AND OBJECTIVE BOX
SURGERY DAILY PROGRESS NOTE:     Subjective:  Patient seen and examined this AM at bedside. No acute events overnight and patient resting comfortably. Pain improving. Natasha FLD. Denies fever/chills, shortness of breath, chest pain. AFVSS    Objective:    MEDICATIONS  (STANDING):  apixaban 5 milliGRAM(s) Oral every 12 hours  atorvastatin 20 milliGRAM(s) Oral at bedtime  diltiazem    milliGRAM(s) Oral daily  metoprolol succinate ER 25 milliGRAM(s) Oral daily  piperacillin/tazobactam IVPB.. 3.375 Gram(s) IV Intermittent every 8 hours    MEDICATIONS  (PRN):  acetaminophen   IVPB .. 1000 milliGRAM(s) IV Intermittent once PRN Mild Pain (1 - 3)  HYDROmorphone  Injectable 1 milliGRAM(s) IV Push every 4 hours PRN Severe Pain (7 - 10)  morphine  - Injectable 2 milliGRAM(s) IV Push every 4 hours PRN Moderate Pain (4 - 6)  ondansetron Injectable 4 milliGRAM(s) IV Push every 6 hours PRN Nausea      Vital Signs Last 24 Hrs  T(C): 37 (30 Oct 2024 00:21), Max: 37 (30 Oct 2024 00:21)  T(F): 98.6 (30 Oct 2024 00:21), Max: 98.6 (30 Oct 2024 00:21)  HR: 75 (30 Oct 2024 00:21) (67 - 75)  BP: 167/61 (30 Oct 2024 00:21) (161/68 - 167/61)  BP(mean): 100 (29 Oct 2024 08:34) (100 - 100)  RR: 17 (29 Oct 2024 22:08) (17 - 18)  SpO2: 100% (29 Oct 2024 22:08) (98% - 100%)    Parameters below as of 29 Oct 2024 22:08  Patient On (Oxygen Delivery Method): room air        PHYSICAL EXAM   GENERAL: NAD, well developed, obese  HEAD: Atraumatic, normocephalic  EYES: EOMI, PERRLA, conjunctiva and sclera clear  ENT: moist mucous membrane  NECK: supple, No JVD, midline trachea  CHEST/LUNG: No increased WOB, symmetric excursions  Heart: RRR ppp, no peripheral edema  ABDOMEN: Round, nondistended, soft, minimal LLQ tenderness, no rebound, no guarding  EXTREMITIES: Brisk cap refill. no clubbing or cyanosis  NERVOUS SYSTEM: AOx4, speech clear, no neuro-deficits  MSK: full ROM, no deformities  SKIN: warm to touch, no rash or lesions    I&O's Detail      Daily     Daily     LABS:                        10.4   6.24  )-----------( 179      ( 29 Oct 2024 07:31 )             31.7     10-29    142  |  112[H]  |  6[L]  ----------------------------<  121[H]  3.4[L]   |  27  |  0.60    Ca    8.6      29 Oct 2024 07:31  Phos  2.7     10-29  Mg     2.0     10-29        Urinalysis Basic - ( 29 Oct 2024 07:31 )    Color: x / Appearance: x / SG: x / pH: x  Gluc: 121 mg/dL / Ketone: x  / Bili: x / Urobili: x   Blood: x / Protein: x / Nitrite: x   Leuk Esterase: x / RBC: x / WBC x   Sq Epi: x / Non Sq Epi: x / Bacteria: x        
SURGERY DAILY PROGRESS NOTE:     Subjective:  Patient seen and examined this AM at bedside. No acute events overnight and patient resting comfortably. Pain improving. Natasha clears w/ mild nausea, improving. Denies fever/chills, shortness of breath, chest pain. VS reviewed    Objective:    MEDICATIONS  (STANDING):  atorvastatin 20 milliGRAM(s) Oral at bedtime  diltiazem    milliGRAM(s) Oral daily  enoxaparin Injectable 40 milliGRAM(s) SubCutaneous every 24 hours  metoprolol succinate ER 25 milliGRAM(s) Oral daily  piperacillin/tazobactam IVPB.. 3.375 Gram(s) IV Intermittent every 8 hours  sodium chloride 0.9%. 1000 milliLiter(s) (125 mL/Hr) IV Continuous <Continuous>    MEDICATIONS  (PRN):  acetaminophen   IVPB .. 1000 milliGRAM(s) IV Intermittent once PRN Mild Pain (1 - 3)  HYDROmorphone  Injectable 1 milliGRAM(s) IV Push every 4 hours PRN Severe Pain (7 - 10)  morphine  - Injectable 2 milliGRAM(s) IV Push every 4 hours PRN Moderate Pain (4 - 6)  ondansetron Injectable 4 milliGRAM(s) IV Push every 6 hours PRN Nausea      Vital Signs Last 24 Hrs  T(C): 36.7 (28 Oct 2024 21:21), Max: 37.1 (28 Oct 2024 07:50)  T(F): 98.1 (28 Oct 2024 21:21), Max: 98.8 (28 Oct 2024 07:50)  HR: 75 (28 Oct 2024 21:21) (71 - 79)  BP: 142/53 (28 Oct 2024 21:21) (141/53 - 158/73)  BP(mean): --  RR: 17 (28 Oct 2024 21:21) (17 - 18)  SpO2: 98% (28 Oct 2024 21:21) (96% - 99%)    Parameters below as of 28 Oct 2024 21:21  Patient On (Oxygen Delivery Method): room air          PHYSICAL EXAM   GENERAL: NAD, well developed, obese  HEAD: Atraumatic, normocephalic  EYES: EOMI, PERRLA, conjunctiva and sclera clear  ENT: moist mucous membrane  NECK: supple, No JVD, midline trachea  CHEST/LUNG: No increased WOB, symmetric excursions  Heart: RRR ppp, no peripheral edema  ABDOMEN: Round, nondistended, soft, LLQ TTP, no rebound, no guarding  EXTREMITIES: Brisk cap refill. no clubbing or cyanosis  NERVOUS SYSTEM: AOx4, speech clear, no neuro-deficits  MSK: full ROM, no deformities  SKIN: warm to touch, no rash or lesions      I&O's Detail    27 Oct 2024 07:01  -  28 Oct 2024 07:00  --------------------------------------------------------  IN:    sodium chloride 0.9%: 1026 mL  Total IN: 1026 mL    OUT:  Total OUT: 0 mL    Total NET: 1026 mL          Daily     Daily     LABS:                        10.9   8.60  )-----------( 170      ( 28 Oct 2024 06:37 )             33.4     10-28    141  |  110[H]  |  10  ----------------------------<  110[H]  3.4[L]   |  24  |  0.53    Ca    8.7      28 Oct 2024 06:37  Phos  2.4     10-28  Mg     2.0     10-28        Urinalysis Basic - ( 28 Oct 2024 06:37 )    Color: x / Appearance: x / SG: x / pH: x  Gluc: 110 mg/dL / Ketone: x  / Bili: x / Urobili: x   Blood: x / Protein: x / Nitrite: x   Leuk Esterase: x / RBC: x / WBC x   Sq Epi: x / Non Sq Epi: x / Bacteria: x
SURGERY DAILY PROGRESS NOTE:     Subjective:  Patient seen and examined at bedside during am rounds reports pain is improved, reports passing flatus, denies n/v. AVSS. Denies any fevers, chills, n/v/d, chest pain or shortness of breath    Objective:    MEDICATIONS  (STANDING):  atorvastatin 20 milliGRAM(s) Oral at bedtime  enoxaparin Injectable 40 milliGRAM(s) SubCutaneous every 24 hours  metoprolol succinate ER 25 milliGRAM(s) Oral daily  piperacillin/tazobactam IVPB.. 3.375 Gram(s) IV Intermittent every 8 hours  sodium chloride 0.9%. 1000 milliLiter(s) (125 mL/Hr) IV Continuous <Continuous>    MEDICATIONS  (PRN):  acetaminophen   IVPB .. 1000 milliGRAM(s) IV Intermittent once PRN Mild Pain (1 - 3)  HYDROmorphone  Injectable 1 milliGRAM(s) IV Push every 4 hours PRN Severe Pain (7 - 10)  morphine  - Injectable 2 milliGRAM(s) IV Push every 4 hours PRN Moderate Pain (4 - 6)  ondansetron Injectable 4 milliGRAM(s) IV Push every 6 hours PRN Nausea      Vital Signs Last 24 Hrs  T(C): 36.6 (27 Oct 2024 23:05), Max: 37.5 (27 Oct 2024 16:18)  T(F): 97.9 (27 Oct 2024 23:05), Max: 99.5 (27 Oct 2024 16:18)  HR: 78 (28 Oct 2024 01:21) (76 - 85)  BP: 146/58 (28 Oct 2024 01:21) (146/58 - 157/80)  BP(mean): 102 (27 Oct 2024 14:35) (88 - 102)  RR: 18 (28 Oct 2024 01:21) (18 - 18)  SpO2: 95% (28 Oct 2024 01:21) (95% - 100%)    Parameters below as of 28 Oct 2024 01:21  Patient On (Oxygen Delivery Method): room air          PHYSICAL EXAM   Gen: well-appearing, in no acute distress  CV: pulse regularly present   Resp: airway patent, non-labored breathing  Abd: soft, tenderness LLQ, ND; no rebound or guarding   Ext. no cyanosis or edema      I&O's Detail      Daily     Daily     LABS:                        12.1   9.77  )-----------( 176      ( 27 Oct 2024 03:53 )             36.3     10-27    137  |  107  |  14  ----------------------------<  159[H]  3.8   |  24  |  0.66    Ca    9.0      27 Oct 2024 03:53    TPro  7.1  /  Alb  3.4  /  TBili  1.0  /  DBili  x   /  AST  11[L]  /  ALT  16  /  AlkPhos  89  10-27      Urinalysis Basic - ( 27 Oct 2024 06:25 )    Color: Yellow / Appearance: Clear / S.017 / pH: x  Gluc: x / Ketone: 15 mg/dL  / Bili: Negative / Urobili: 1.0 mg/dL   Blood: x / Protein: Trace mg/dL / Nitrite: Negative   Leuk Esterase: Negative / RBC: x / WBC x   Sq Epi: x / Non Sq Epi: x / Bacteria: x             
CC: Patient is a 79y old  Female who presents with a chief complaint of Diverticulitis with microperf (29 Oct 2024 05:01)      INTERVAL EVENTS: CAMMY    SUBJECTIVE / INTERVAL HPI: Patient seen and examined at bedside. Patient states she is hungry and had no pain overnight    ROS: negative unless otherwise stated above.    VITAL SIGNS:  Vital Signs Last 24 Hrs  T(C): 36.7 (29 Oct 2024 08:34), Max: 36.7 (28 Oct 2024 21:21)  T(F): 98.1 (29 Oct 2024 08:34), Max: 98.1 (28 Oct 2024 21:21)  HR: 74 (29 Oct 2024 08:34) (74 - 75)  BP: 161/77 (29 Oct 2024 08:34) (141/53 - 161/77)  BP(mean): 100 (29 Oct 2024 08:34) (100 - 100)  RR: 18 (29 Oct 2024 08:34) (17 - 18)  SpO2: 98% (29 Oct 2024 08:34) (98% - 99%)    Parameters below as of 29 Oct 2024 08:34  Patient On (Oxygen Delivery Method): room air            PHYSICAL EXAM:    General: NAD  HEENT: MMM  Neck: supple  Cardiovascular: +S1/S2; RRR  Respiratory: CTA B/L; no W/R/R  Gastrointestinal: soft, NT/ND  Extremities: WWP; no edema, clubbing or cyanosis  Vascular: 2+ radial, DP/PT pulses B/L  Neurological: AAOx3; no focal deficits    MEDICATIONS:  MEDICATIONS  (STANDING):  apixaban 5 milliGRAM(s) Oral every 12 hours  atorvastatin 20 milliGRAM(s) Oral at bedtime  diltiazem    milliGRAM(s) Oral daily  metoprolol succinate ER 25 milliGRAM(s) Oral daily  piperacillin/tazobactam IVPB.. 3.375 Gram(s) IV Intermittent every 8 hours  potassium chloride    Tablet ER 20 milliEquivalent(s) Oral every 2 hours  potassium phosphate / sodium phosphate Powder (PHOS-NaK) 1 Packet(s) Oral once  sodium chloride 0.9%. 1000 milliLiter(s) (125 mL/Hr) IV Continuous <Continuous>    MEDICATIONS  (PRN):  acetaminophen   IVPB .. 1000 milliGRAM(s) IV Intermittent once PRN Mild Pain (1 - 3)  HYDROmorphone  Injectable 1 milliGRAM(s) IV Push every 4 hours PRN Severe Pain (7 - 10)  morphine  - Injectable 2 milliGRAM(s) IV Push every 4 hours PRN Moderate Pain (4 - 6)  ondansetron Injectable 4 milliGRAM(s) IV Push every 6 hours PRN Nausea      ALLERGIES:  Allergies    No Known Allergies    Intolerances        LABS:                        10.4   6.24  )-----------( 179      ( 29 Oct 2024 07:31 )             31.7     10-29    142  |  112[H]  |  6[L]  ----------------------------<  121[H]  3.4[L]   |  27  |  0.60    Ca    8.6      29 Oct 2024 07:31  Phos  2.7     10-29  Mg     2.0     10-29        Urinalysis Basic - ( 29 Oct 2024 07:31 )    Color: x / Appearance: x / SG: x / pH: x  Gluc: 121 mg/dL / Ketone: x  / Bili: x / Urobili: x   Blood: x / Protein: x / Nitrite: x   Leuk Esterase: x / RBC: x / WBC x   Sq Epi: x / Non Sq Epi: x / Bacteria: x      CAPILLARY BLOOD GLUCOSE          RADIOLOGY & ADDITIONAL TESTS: Reviewed.
HPI: 77 yo F  with history of afib on eliquis, htn, hld presented to ED with abdominal pain. Pt states that this abd pain started yesterday and has progressively gotten worse. Denies ever having a colonoscope, or having any episode of diverticulitis beforehand. Denies any fever/chills, chest pain or SOB. (27 Oct 2024 07:28)    Subjective: Patient seen this AM, planned for d/c today by primary team, feels well, tolerating orally, no overnight issues reported    REVIEW OF SYSTEMS:    CONSTITUTIONAL: No weakness, fevers or chills  EYES/ENT: No visual changes;  No vertigo or throat pain   NECK: No pain or stiffness  RESPIRATORY: No cough, wheezing, hemoptysis; No shortness of breath  CARDIOVASCULAR: No chest pain or palpitations  GASTROINTESTINAL: No abdominal or epigastric pain. No nausea, vomiting, or hematemesis; No diarrhea or constipation. No melena or hematochezia.  GENITOURINARY: No dysuria, frequency or hematuria  NEUROLOGICAL: No numbness or weakness  SKIN: No itching, rashes    MEDICATIONS  (STANDING):  apixaban 5 milliGRAM(s) Oral every 12 hours  atorvastatin 20 milliGRAM(s) Oral at bedtime  diltiazem    milliGRAM(s) Oral daily  metoprolol succinate ER 25 milliGRAM(s) Oral daily  piperacillin/tazobactam IVPB.. 3.375 Gram(s) IV Intermittent every 8 hours  potassium chloride   Powder 40 milliEquivalent(s) Oral once    MEDICATIONS  (PRN):  acetaminophen   IVPB .. 1000 milliGRAM(s) IV Intermittent once PRN Mild Pain (1 - 3)  HYDROmorphone  Injectable 1 milliGRAM(s) IV Push every 4 hours PRN Severe Pain (7 - 10)  morphine  - Injectable 2 milliGRAM(s) IV Push every 4 hours PRN Moderate Pain (4 - 6)  ondansetron Injectable 4 milliGRAM(s) IV Push every 6 hours PRN Nausea      Vital Signs Last 24 Hrs  T(C): 36.5 (30 Oct 2024 08:22), Max: 37 (30 Oct 2024 00:21)  T(F): 97.7 (30 Oct 2024 08:22), Max: 98.6 (30 Oct 2024 00:21)  HR: 64 (30 Oct 2024 08:22) (64 - 75)  BP: 148/54 (30 Oct 2024 08:22) (148/54 - 167/61)  RR: 18 (30 Oct 2024 08:22) (17 - 18)  SpO2: 95% (30 Oct 2024 08:22) (95% - 100%)    Parameters below as of 30 Oct 2024 08:22  Patient On (Oxygen Delivery Method): room air      PHYSICAL EXAM:  GENERAL: NAD, lying in bed comfortably  HEAD:  Atraumatic, Normocephalic  EYES: conjunctiva and sclera clear  ENT: Moist mucous membranes  NECK: Supple, No JVD  CHEST/LUNG: Clear to auscultation bilaterally; No rales, rhonchi, wheezing. Unlabored respirations  HEART: Regular rate and rhythm; No murmurs  ABDOMEN: Bowel sounds present; Soft, Nontender, Nondistended.   EXTREMITIES:  2+ Peripheral Pulses, brisk capillary refill. No clubbing, cyanosis, or edema  NERVOUS SYSTEM:  Alert & Oriented X3, speech clear. No deficits   MSK: FROM all 4 extremities, full and equal strength          LABS:                          10.7   5.91  )-----------( 201      ( 30 Oct 2024 07:11 )             32.4     30 Oct 2024 07:11    141    |  111    |  5      ----------------------------<  139    3.6     |  25     |  0.53     Ca    9.0        30 Oct 2024 07:11  Phos  3.5       30 Oct 2024 07:11  Mg     1.9       30 Oct 2024 07:11          CAPILLARY BLOOD GLUCOSE            Urinalysis Basic - ( 30 Oct 2024 07:11 )    Color: x / Appearance: x / SG: x / pH: x  Gluc: 139 mg/dL / Ketone: x  / Bili: x / Urobili: x   Blood: x / Protein: x / Nitrite: x   Leuk Esterase: x / RBC: x / WBC x   Sq Epi: x / Non Sq Epi: x / Bacteria: x        RADIOLOGY:

## 2024-10-30 NOTE — DISCHARGE NOTE NURSING/CASE MANAGEMENT/SOCIAL WORK - PATIENT PORTAL LINK FT
You can access the FollowMyHealth Patient Portal offered by [facility] by registering at the following website: [email or web address]. By joining [email or web address] [email or web address] portal, you will also be able to view your health information using other applications (apps) compatible with our system.

## 2024-11-07 DIAGNOSIS — I10 ESSENTIAL (PRIMARY) HYPERTENSION: ICD-10-CM

## 2024-11-07 DIAGNOSIS — K57.20 DIVERTICULITIS OF LARGE INTESTINE WITH PERFORATION AND ABSCESS WITHOUT BLEEDING: ICD-10-CM

## 2024-11-07 DIAGNOSIS — Z79.01 LONG TERM (CURRENT) USE OF ANTICOAGULANTS: ICD-10-CM

## 2024-11-07 DIAGNOSIS — E78.5 HYPERLIPIDEMIA, UNSPECIFIED: ICD-10-CM

## 2024-11-07 DIAGNOSIS — Z79.899 OTHER LONG TERM (CURRENT) DRUG THERAPY: ICD-10-CM

## 2024-11-07 DIAGNOSIS — I48.0 PAROXYSMAL ATRIAL FIBRILLATION: ICD-10-CM

## 2024-11-07 DIAGNOSIS — Z79.02 LONG TERM (CURRENT) USE OF ANTITHROMBOTICS/ANTIPLATELETS: ICD-10-CM

## 2024-11-07 DIAGNOSIS — Z79.82 LONG TERM (CURRENT) USE OF ASPIRIN: ICD-10-CM

## 2024-11-08 ENCOUNTER — APPOINTMENT (OUTPATIENT)
Dept: FAMILY MEDICINE | Facility: CLINIC | Age: 79
End: 2024-11-08

## 2024-11-10 PROBLEM — K57.32 DIVERTICULITIS, COLON: Status: ACTIVE | Noted: 2024-11-10

## 2024-11-11 ENCOUNTER — APPOINTMENT (OUTPATIENT)
Dept: FAMILY MEDICINE | Facility: CLINIC | Age: 79
End: 2024-11-11
Payer: MEDICARE

## 2024-11-11 VITALS
SYSTOLIC BLOOD PRESSURE: 134 MMHG | DIASTOLIC BLOOD PRESSURE: 68 MMHG | HEART RATE: 83 BPM | WEIGHT: 194 LBS | BODY MASS INDEX: 34.38 KG/M2 | TEMPERATURE: 97.7 F | HEIGHT: 63 IN | OXYGEN SATURATION: 96 %

## 2024-11-11 DIAGNOSIS — R91.1 SOLITARY PULMONARY NODULE: ICD-10-CM

## 2024-11-11 DIAGNOSIS — M15.9 POLYOSTEOARTHRITIS, UNSPECIFIED: ICD-10-CM

## 2024-11-11 DIAGNOSIS — I48.0 PAROXYSMAL ATRIAL FIBRILLATION: ICD-10-CM

## 2024-11-11 DIAGNOSIS — I10 ESSENTIAL (PRIMARY) HYPERTENSION: ICD-10-CM

## 2024-11-11 DIAGNOSIS — Z72.0 TOBACCO USE: ICD-10-CM

## 2024-11-11 DIAGNOSIS — E11.9 TYPE 2 DIABETES MELLITUS W/OUT COMPLICATIONS: ICD-10-CM

## 2024-11-11 DIAGNOSIS — E78.00 PURE HYPERCHOLESTEROLEMIA, UNSPECIFIED: ICD-10-CM

## 2024-11-11 PROCEDURE — 99215 OFFICE O/P EST HI 40 MIN: CPT

## 2024-11-11 PROCEDURE — G2211 COMPLEX E/M VISIT ADD ON: CPT

## 2024-11-14 ENCOUNTER — APPOINTMENT (OUTPATIENT)
Dept: COLORECTAL SURGERY | Facility: CLINIC | Age: 79
End: 2024-11-14
Payer: MEDICARE

## 2024-11-14 VITALS
SYSTOLIC BLOOD PRESSURE: 164 MMHG | HEART RATE: 71 BPM | HEIGHT: 64 IN | BODY MASS INDEX: 33.29 KG/M2 | DIASTOLIC BLOOD PRESSURE: 76 MMHG | WEIGHT: 195 LBS | RESPIRATION RATE: 16 BRPM | OXYGEN SATURATION: 99 %

## 2024-11-14 DIAGNOSIS — K57.32 DIVERTICULITIS OF LARGE INTESTINE W/OUT PERFORATION OR ABSCESS W/OUT BLEEDING: ICD-10-CM

## 2024-11-14 DIAGNOSIS — R19.5 OTHER FECAL ABNORMALITIES: ICD-10-CM

## 2024-11-14 PROCEDURE — 99214 OFFICE O/P EST MOD 30 MIN: CPT

## 2024-12-16 ENCOUNTER — APPOINTMENT (OUTPATIENT)
Dept: FAMILY MEDICINE | Facility: CLINIC | Age: 79
End: 2024-12-16

## 2025-01-06 ENCOUNTER — APPOINTMENT (OUTPATIENT)
Dept: FAMILY MEDICINE | Facility: CLINIC | Age: 80
End: 2025-01-06
Payer: MEDICARE

## 2025-01-06 ENCOUNTER — MED ADMIN CHARGE (OUTPATIENT)
Age: 80
End: 2025-01-06

## 2025-01-06 VITALS
BODY MASS INDEX: 33.29 KG/M2 | SYSTOLIC BLOOD PRESSURE: 142 MMHG | WEIGHT: 195 LBS | OXYGEN SATURATION: 96 % | HEART RATE: 82 BPM | HEIGHT: 64 IN | DIASTOLIC BLOOD PRESSURE: 78 MMHG | TEMPERATURE: 97.2 F

## 2025-01-06 DIAGNOSIS — E78.00 PURE HYPERCHOLESTEROLEMIA, UNSPECIFIED: ICD-10-CM

## 2025-01-06 DIAGNOSIS — G47.33 OBSTRUCTIVE SLEEP APNEA (ADULT) (PEDIATRIC): ICD-10-CM

## 2025-01-06 DIAGNOSIS — J44.9 CHRONIC OBSTRUCTIVE PULMONARY DISEASE, UNSPECIFIED: ICD-10-CM

## 2025-01-06 DIAGNOSIS — I10 ESSENTIAL (PRIMARY) HYPERTENSION: ICD-10-CM

## 2025-01-06 DIAGNOSIS — E11.9 TYPE 2 DIABETES MELLITUS W/OUT COMPLICATIONS: ICD-10-CM

## 2025-01-06 PROCEDURE — 99214 OFFICE O/P EST MOD 30 MIN: CPT

## 2025-01-06 PROCEDURE — 90656 IIV3 VACC NO PRSV 0.5 ML IM: CPT

## 2025-01-06 PROCEDURE — G0008: CPT

## 2025-01-22 NOTE — PROGRESS NOTE ADULT - ATTENDING SUPERVISION STATEMENT
Before Your Child s Surgery or Sedated Procedure    Please call the doctor if there s any change in your child s health, including signs of a cold or flu (sore throat, runny nose, cough, rash or fever). If your child is having surgery, call the surgeon s office. If your child is having another procedure, call your family doctor.  Do not give over-the-counter medicine within 24 hours of the surgery or procedure (unless the doctor tells you to).  If your child takes prescribed drugs: Ask the doctor which medicines are safe to take before the surgery or procedure.  Follow the care team s instructions for eating and drinking before surgery or procedure.   Have your child take a shower or bath the night before surgery, cleaning their skin gently. Use the soap the surgeon gave you. If you were not given special soap, use your regular soap. Do not shave or scrub the surgery site.  Have your child wear clean pajamas and use clean sheets on their bed.         Please call Radiology  [address] [id number] [phone]  
Resident
Resident

## 2025-02-11 ENCOUNTER — APPOINTMENT (OUTPATIENT)
Dept: FAMILY MEDICINE | Facility: CLINIC | Age: 80
End: 2025-02-11

## 2025-02-27 ENCOUNTER — APPOINTMENT (OUTPATIENT)
Dept: FAMILY MEDICINE | Facility: CLINIC | Age: 80
End: 2025-02-27
Payer: MEDICARE

## 2025-02-27 PROCEDURE — 36415 COLL VENOUS BLD VENIPUNCTURE: CPT

## 2025-02-28 RX ORDER — METFORMIN ER 500 MG 500 MG/1
500 TABLET ORAL
Qty: 90 | Refills: 0 | Status: ACTIVE | COMMUNITY
Start: 2025-02-28 | End: 1900-01-01

## 2025-04-07 ENCOUNTER — RX RENEWAL (OUTPATIENT)
Age: 80
End: 2025-04-07

## 2025-05-10 ENCOUNTER — NON-APPOINTMENT (OUTPATIENT)
Age: 80
End: 2025-05-10

## 2025-05-13 ENCOUNTER — NON-APPOINTMENT (OUTPATIENT)
Age: 80
End: 2025-05-13

## 2025-05-13 PROBLEM — Z13.89 SCREENING FOR MULTIPLE CONDITIONS: Status: ACTIVE | Noted: 2025-05-13

## 2025-05-14 ENCOUNTER — APPOINTMENT (OUTPATIENT)
Dept: FAMILY MEDICINE | Facility: CLINIC | Age: 80
End: 2025-05-14
Payer: MEDICARE

## 2025-05-14 VITALS
BODY MASS INDEX: 34.15 KG/M2 | WEIGHT: 200 LBS | DIASTOLIC BLOOD PRESSURE: 62 MMHG | HEART RATE: 65 BPM | OXYGEN SATURATION: 99 % | TEMPERATURE: 97.4 F | HEIGHT: 64 IN | SYSTOLIC BLOOD PRESSURE: 142 MMHG

## 2025-05-14 VITALS — SYSTOLIC BLOOD PRESSURE: 130 MMHG | DIASTOLIC BLOOD PRESSURE: 70 MMHG

## 2025-05-14 DIAGNOSIS — K59.09 OTHER CONSTIPATION: ICD-10-CM

## 2025-05-14 DIAGNOSIS — Z72.0 TOBACCO USE: ICD-10-CM

## 2025-05-14 DIAGNOSIS — E78.00 PURE HYPERCHOLESTEROLEMIA, UNSPECIFIED: ICD-10-CM

## 2025-05-14 DIAGNOSIS — J44.9 CHRONIC OBSTRUCTIVE PULMONARY DISEASE, UNSPECIFIED: ICD-10-CM

## 2025-05-14 DIAGNOSIS — R19.5 OTHER FECAL ABNORMALITIES: ICD-10-CM

## 2025-05-14 DIAGNOSIS — Z13.89 ENCOUNTER FOR SCREENING FOR OTHER DISORDER: ICD-10-CM

## 2025-05-14 DIAGNOSIS — I10 ESSENTIAL (PRIMARY) HYPERTENSION: ICD-10-CM

## 2025-05-14 DIAGNOSIS — E11.9 TYPE 2 DIABETES MELLITUS W/OUT COMPLICATIONS: ICD-10-CM

## 2025-05-14 DIAGNOSIS — K57.32 DIVERTICULITIS OF LARGE INTESTINE W/OUT PERFORATION OR ABSCESS W/OUT BLEEDING: ICD-10-CM

## 2025-05-14 DIAGNOSIS — I48.0 PAROXYSMAL ATRIAL FIBRILLATION: ICD-10-CM

## 2025-05-14 DIAGNOSIS — Z00.00 ENCOUNTER FOR GENERAL ADULT MEDICAL EXAMINATION W/OUT ABNORMAL FINDINGS: ICD-10-CM

## 2025-05-14 PROCEDURE — 36415 COLL VENOUS BLD VENIPUNCTURE: CPT

## 2025-05-14 PROCEDURE — G0439: CPT

## 2025-05-14 PROCEDURE — G0446: CPT

## 2025-05-14 PROCEDURE — G0447 BEHAVIOR COUNSEL OBESITY 15M: CPT

## 2025-05-14 RX ORDER — METFORMIN ER 500 MG 500 MG/1
500 TABLET ORAL
Qty: 90 | Refills: 3 | Status: ACTIVE | COMMUNITY
Start: 2025-05-14 | End: 1900-01-01

## 2025-05-15 ENCOUNTER — RX RENEWAL (OUTPATIENT)
Age: 80
End: 2025-05-15

## 2025-05-17 DIAGNOSIS — E78.41 ELEVATED LIPOPROTEIN(A): ICD-10-CM

## 2025-05-17 LAB — APO LP(A) SERPL-MCNC: 102.2 NMOL/L

## 2025-05-19 RX ORDER — METFORMIN ER 500 MG 500 MG/1
500 TABLET ORAL
Qty: 180 | Refills: 0 | Status: ACTIVE | COMMUNITY
Start: 2025-05-19 | End: 1900-01-01

## 2025-05-21 ENCOUNTER — APPOINTMENT (OUTPATIENT)
Dept: MAMMOGRAPHY | Facility: CLINIC | Age: 80
End: 2025-05-21
Payer: MEDICARE

## 2025-05-21 ENCOUNTER — RESULT REVIEW (OUTPATIENT)
Age: 80
End: 2025-05-21

## 2025-05-21 ENCOUNTER — TRANSCRIPTION ENCOUNTER (OUTPATIENT)
Age: 80
End: 2025-05-21

## 2025-05-21 ENCOUNTER — APPOINTMENT (OUTPATIENT)
Dept: RADIOLOGY | Facility: CLINIC | Age: 80
End: 2025-05-21
Payer: MEDICARE

## 2025-05-21 ENCOUNTER — OUTPATIENT (OUTPATIENT)
Dept: OUTPATIENT SERVICES | Facility: HOSPITAL | Age: 80
LOS: 1 days | End: 2025-05-21
Payer: MEDICARE

## 2025-05-21 DIAGNOSIS — Z13.820 ENCOUNTER FOR SCREENING FOR OSTEOPOROSIS: ICD-10-CM

## 2025-05-21 DIAGNOSIS — Z12.39 ENCOUNTER FOR OTHER SCREENING FOR MALIGNANT NEOPLASM OF BREAST: ICD-10-CM

## 2025-05-21 PROCEDURE — 77063 BREAST TOMOSYNTHESIS BI: CPT

## 2025-05-21 PROCEDURE — 77080 DXA BONE DENSITY AXIAL: CPT | Mod: 26

## 2025-05-21 PROCEDURE — 77067 SCR MAMMO BI INCL CAD: CPT | Mod: 26

## 2025-05-21 PROCEDURE — 77063 BREAST TOMOSYNTHESIS BI: CPT | Mod: 26

## 2025-05-21 PROCEDURE — 77080 DXA BONE DENSITY AXIAL: CPT

## 2025-05-21 PROCEDURE — 77085 DXA BONE DENSITY AXL VRT FX: CPT

## 2025-05-21 PROCEDURE — 77067 SCR MAMMO BI INCL CAD: CPT

## 2025-05-22 ENCOUNTER — NON-APPOINTMENT (OUTPATIENT)
Age: 80
End: 2025-05-22

## 2025-05-23 ENCOUNTER — EMERGENCY (EMERGENCY)
Facility: HOSPITAL | Age: 80
LOS: 0 days | Discharge: HOME | End: 2025-05-24
Attending: HOSPITALIST
Payer: MEDICARE

## 2025-05-23 VITALS
DIASTOLIC BLOOD PRESSURE: 59 MMHG | HEIGHT: 64 IN | HEART RATE: 94 BPM | OXYGEN SATURATION: 98 % | SYSTOLIC BLOOD PRESSURE: 125 MMHG | TEMPERATURE: 98 F | WEIGHT: 199.96 LBS | RESPIRATION RATE: 17 BRPM

## 2025-05-23 DIAGNOSIS — R00.0 TACHYCARDIA, UNSPECIFIED: ICD-10-CM

## 2025-05-23 DIAGNOSIS — I10 ESSENTIAL (PRIMARY) HYPERTENSION: ICD-10-CM

## 2025-05-23 DIAGNOSIS — Z79.01 LONG TERM (CURRENT) USE OF ANTICOAGULANTS: ICD-10-CM

## 2025-05-23 DIAGNOSIS — R00.2 PALPITATIONS: ICD-10-CM

## 2025-05-23 DIAGNOSIS — I48.0 PAROXYSMAL ATRIAL FIBRILLATION: ICD-10-CM

## 2025-05-23 LAB
ALBUMIN SERPL ELPH-MCNC: 3.6 G/DL — SIGNIFICANT CHANGE UP (ref 3.3–5)
ALP SERPL-CCNC: 108 U/L — SIGNIFICANT CHANGE UP (ref 40–120)
ALT FLD-CCNC: 28 U/L — SIGNIFICANT CHANGE UP (ref 12–78)
ANION GAP SERPL CALC-SCNC: 3 MMOL/L — LOW (ref 5–17)
APTT BLD: 33.8 SEC — SIGNIFICANT CHANGE UP (ref 26.1–36.8)
AST SERPL-CCNC: 19 U/L — SIGNIFICANT CHANGE UP (ref 15–37)
BASOPHILS # BLD AUTO: 0.02 K/UL — SIGNIFICANT CHANGE UP (ref 0–0.2)
BASOPHILS NFR BLD AUTO: 0.3 % — SIGNIFICANT CHANGE UP (ref 0–2)
BILIRUB SERPL-MCNC: 0.3 MG/DL — SIGNIFICANT CHANGE UP (ref 0.2–1.2)
BUN SERPL-MCNC: 21 MG/DL — SIGNIFICANT CHANGE UP (ref 7–23)
CALCIUM SERPL-MCNC: 9.4 MG/DL — SIGNIFICANT CHANGE UP (ref 8.5–10.1)
CHLORIDE SERPL-SCNC: 110 MMOL/L — HIGH (ref 96–108)
CO2 SERPL-SCNC: 26 MMOL/L — SIGNIFICANT CHANGE UP (ref 22–31)
CREAT SERPL-MCNC: 0.82 MG/DL — SIGNIFICANT CHANGE UP (ref 0.5–1.3)
EGFR: 72 ML/MIN/1.73M2 — SIGNIFICANT CHANGE UP
EGFR: 72 ML/MIN/1.73M2 — SIGNIFICANT CHANGE UP
EOSINOPHIL # BLD AUTO: 0.09 K/UL — SIGNIFICANT CHANGE UP (ref 0–0.5)
EOSINOPHIL NFR BLD AUTO: 1.2 % — SIGNIFICANT CHANGE UP (ref 0–6)
GLUCOSE SERPL-MCNC: 198 MG/DL — HIGH (ref 70–99)
HCT VFR BLD CALC: 38 % — SIGNIFICANT CHANGE UP (ref 34.5–45)
HGB BLD-MCNC: 12.4 G/DL — SIGNIFICANT CHANGE UP (ref 11.5–15.5)
IMM GRANULOCYTES # BLD AUTO: 0.04 K/UL — SIGNIFICANT CHANGE UP (ref 0–0.07)
IMM GRANULOCYTES NFR BLD AUTO: 0.6 % — SIGNIFICANT CHANGE UP (ref 0–0.9)
INR BLD: 1.31 RATIO — HIGH (ref 0.85–1.16)
LYMPHOCYTES # BLD AUTO: 2.28 K/UL — SIGNIFICANT CHANGE UP (ref 1–3.3)
LYMPHOCYTES NFR BLD AUTO: 31.5 % — SIGNIFICANT CHANGE UP (ref 13–44)
MCHC RBC-ENTMCNC: 29.5 PG — SIGNIFICANT CHANGE UP (ref 27–34)
MCHC RBC-ENTMCNC: 32.6 G/DL — SIGNIFICANT CHANGE UP (ref 32–36)
MCV RBC AUTO: 90.3 FL — SIGNIFICANT CHANGE UP (ref 80–100)
MONOCYTES # BLD AUTO: 0.47 K/UL — SIGNIFICANT CHANGE UP (ref 0–0.9)
MONOCYTES NFR BLD AUTO: 6.5 % — SIGNIFICANT CHANGE UP (ref 2–14)
NEUTROPHILS # BLD AUTO: 4.34 K/UL — SIGNIFICANT CHANGE UP (ref 1.8–7.4)
NEUTROPHILS NFR BLD AUTO: 59.9 % — SIGNIFICANT CHANGE UP (ref 43–77)
NRBC # BLD AUTO: 0 K/UL — SIGNIFICANT CHANGE UP (ref 0–0)
NRBC # FLD: 0 K/UL — SIGNIFICANT CHANGE UP (ref 0–0)
NRBC BLD AUTO-RTO: 0 /100 WBCS — SIGNIFICANT CHANGE UP (ref 0–0)
PLATELET # BLD AUTO: 211 K/UL — SIGNIFICANT CHANGE UP (ref 150–400)
PMV BLD: 11.4 FL — SIGNIFICANT CHANGE UP (ref 7–13)
POTASSIUM SERPL-MCNC: 4.7 MMOL/L — SIGNIFICANT CHANGE UP (ref 3.5–5.3)
POTASSIUM SERPL-SCNC: 4.7 MMOL/L — SIGNIFICANT CHANGE UP (ref 3.5–5.3)
PROT SERPL-MCNC: 6.8 GM/DL — SIGNIFICANT CHANGE UP (ref 6–8.3)
PROTHROM AB SERPL-ACNC: 15 SEC — HIGH (ref 9.9–13.4)
RBC # BLD: 4.21 M/UL — SIGNIFICANT CHANGE UP (ref 3.8–5.2)
RBC # FLD: 14 % — SIGNIFICANT CHANGE UP (ref 10.3–14.5)
SODIUM SERPL-SCNC: 139 MMOL/L — SIGNIFICANT CHANGE UP (ref 135–145)
TROPONIN I, HIGH SENSITIVITY RESULT: 23.94 NG/L — SIGNIFICANT CHANGE UP
TSH SERPL-MCNC: 5.13 UU/ML — HIGH (ref 0.34–4.82)
WBC # BLD: 7.24 K/UL — SIGNIFICANT CHANGE UP (ref 3.8–10.5)
WBC # FLD AUTO: 7.24 K/UL — SIGNIFICANT CHANGE UP (ref 3.8–10.5)

## 2025-05-23 PROCEDURE — 93010 ELECTROCARDIOGRAM REPORT: CPT

## 2025-05-23 PROCEDURE — 99285 EMERGENCY DEPT VISIT HI MDM: CPT | Mod: 25

## 2025-05-23 PROCEDURE — 84484 ASSAY OF TROPONIN QUANT: CPT

## 2025-05-23 PROCEDURE — 93005 ELECTROCARDIOGRAM TRACING: CPT

## 2025-05-23 PROCEDURE — 36415 COLL VENOUS BLD VENIPUNCTURE: CPT

## 2025-05-23 PROCEDURE — 85025 COMPLETE CBC W/AUTO DIFF WBC: CPT

## 2025-05-23 PROCEDURE — 71045 X-RAY EXAM CHEST 1 VIEW: CPT | Mod: 26

## 2025-05-23 PROCEDURE — 80053 COMPREHEN METABOLIC PANEL: CPT

## 2025-05-23 PROCEDURE — 71045 X-RAY EXAM CHEST 1 VIEW: CPT

## 2025-05-23 PROCEDURE — 84443 ASSAY THYROID STIM HORMONE: CPT

## 2025-05-23 PROCEDURE — 99285 EMERGENCY DEPT VISIT HI MDM: CPT

## 2025-05-23 PROCEDURE — 85610 PROTHROMBIN TIME: CPT

## 2025-05-23 PROCEDURE — 85730 THROMBOPLASTIN TIME PARTIAL: CPT

## 2025-05-23 NOTE — ED PROVIDER NOTE - CLINICAL SUMMARY MEDICAL DECISION MAKING FREE TEXT BOX
79 year old female with PAF, currently a-fib, feeling palpitations and warm sensation in the chest. Will obtain labs, EKG, and chest x-ray.

## 2025-05-23 NOTE — ED PROVIDER NOTE - OBJECTIVE STATEMENT
79 year old female with past medical history of HTN and atrial fibrillation (on Eliquis), presents to ED after feeling an episode of palpitations around 10 PM and feeling warmth from her neck to the upper chest area. Patient reports checking her heart rate on [employer] delores which noted A-fib and heart rate to be 100, baseline close to 60. She denies chest pain or shortness of breath. Patient states that she is feeling better now and that all symptoms have currently resolved. Patient is a nonsmoker. Her last stress test was taken a few years ago and her next appointment with cardiologist Dr. Apple is in July.

## 2025-05-23 NOTE — ED PROVIDER NOTE - PATIENT PORTAL LINK FT
You can access the FollowMyHealth Patient Portal offered by [facility] by registering at the following website: [email or web address]. By joining [employer]’s FollowMyHealth portal, you will also be able to view your health information using other applications (apps) compatible with our system.

## 2025-05-23 NOTE — ED PROVIDER NOTE - PHYSICAL EXAMINATION
Constitutional: NAD AAOx3  Eyes: PERRLA EOMI  Head: Normocephalic atraumatic  Mouth: MMM  Cardiac: irregularly irregular, tachycardic   Resp: Lungs CTAB  GI: Abd s/nt/nd  Neuro: CN2-12 intact  Skin: No visible rashes

## 2025-05-23 NOTE — ED ADULT TRIAGE NOTE - CHIEF COMPLAINT QUOTE
patient reports history of Afib on Eliquis, this evening had palpitations EMS states patients rate was .  patient currently denies CP, SOB or palpitations. EKG at triage NSR

## 2025-05-23 NOTE — ED PROVIDER NOTE - NSFOLLOWUPINSTRUCTIONS_ED_ALL_ED_FT
Please follow-up with your cardiologist in the next few days for routine follow-up care.  Please return for any new, recurrent or concerning issues. no

## 2025-05-24 VITALS
TEMPERATURE: 98 F | OXYGEN SATURATION: 96 % | RESPIRATION RATE: 16 BRPM | HEART RATE: 92 BPM | DIASTOLIC BLOOD PRESSURE: 62 MMHG | SYSTOLIC BLOOD PRESSURE: 121 MMHG

## 2025-05-24 LAB — TROPONIN I, HIGH SENSITIVITY RESULT: 27.6 NG/L — SIGNIFICANT CHANGE UP

## 2025-05-24 NOTE — ED ADULT NURSE NOTE - NSFALLRISK_ED_ALL_ED
No
Principal Discharge DX:	Fall from slipping on slippery surface, initial encounter  Secondary Diagnosis:	Closed head injury, initial encounter  Secondary Diagnosis:	Scalp hematoma, initial encounter

## 2025-05-24 NOTE — ED ADULT NURSE NOTE - OBJECTIVE STATEMENT
80 y/o female presents to the ED c/o palpitations and being in rapid a fib. Pt states she looked at her HR on her watch and it said she was in a fib 90-150s. Upon initial assessment pt denies any palpitations, chest pain, SOB, or any other complaints. EKG complete and cardiac monitor in place, safety and comfort maintained.

## 2025-06-26 NOTE — DISCHARGE NOTE PROVIDER - EXTENDED VTE YES NO FOR MLM ENOXAPARIN
PHYSICAL THERAPY - MEDICARE DAILY TREATMENT NOTE (updated 3/23)      Date: 2025          Patient Name:  Clarissa Alonzo :  1953   Medical   Diagnosis:  Posterior tibial tendinitis, right leg [M76.821] Treatment Diagnosis:  M25.571 RIGHT ANKLE PAIN and pain in the joint of the right foot     Referral Source:  Clint Valdez PA-C Insurance:   Payor: MEDICARE / Plan: MEDICARE PART A AND B / Product Type: *No Product type* /                     Patient  verified yes     Visit #   Current  / Total 2 24   Time   In / Out 8:38 am 9:24 am   Total Treatment Time 46   Total Timed Codes 42   1:1 Treatment Time 42      [facility] Totals Reminder:  bill using total billable   min of TIMED therapeutic procedures and modalities.   8-22 min = 1 unit; 23-37 min = 2 units; 38-52 min = 3 units; 53-67 min = 4 units; 68-82 min = 5 units            SUBJECTIVE  If an interpreting service was utilized for treatment of this patient, the contents of this document represent the material reviewed with the patient via the .     Pain Level (0-10 scale): 2    Any medication changes, allergies to medications, adverse drug reactions, diagnosis change, or new procedure performed?: [x] No    [] Yes (see summary sheet for update)  Medications: Verified on Patient Summary List    Subjective functional status/changes:     No new complaints.    OBJECTIVE      Therapeutic Procedures:  Tx Min Billable or 1:1 Min (if diff from Tx Min) Procedure, Rationale, Specifics   42  [id number] Therapeutic Exercise (timed):  increase ROM, strength, coordination, balance, and proprioception to improve patient's ability to progress to PLOF and address remaining functional goals. (see flow sheet as applicable)     Details if applicable:            Details if applicable:           Details if applicable:           Details if applicable:            Details if applicable:     42     Total Total     [x]  Patient Education billed concurrently with other 
,

## 2025-07-07 ENCOUNTER — APPOINTMENT (OUTPATIENT)
Dept: INTERNAL MEDICINE | Facility: CLINIC | Age: 80
End: 2025-07-07
Payer: MEDICARE

## 2025-07-07 VITALS
RESPIRATION RATE: 16 BRPM | HEIGHT: 63 IN | DIASTOLIC BLOOD PRESSURE: 65 MMHG | TEMPERATURE: 97.5 F | HEART RATE: 61 BPM | SYSTOLIC BLOOD PRESSURE: 141 MMHG | BODY MASS INDEX: 34.91 KG/M2 | WEIGHT: 197 LBS | OXYGEN SATURATION: 97 %

## 2025-07-07 PROCEDURE — 99214 OFFICE O/P EST MOD 30 MIN: CPT | Mod: 25

## 2025-07-07 PROCEDURE — 94060 EVALUATION OF WHEEZING: CPT

## 2025-08-01 NOTE — PATIENT PROFILE ADULT - FUNCTIONAL ASSESSMENT - BASIC MOBILITY 3.
Addended by: SHELBY WU on: 8/1/2025 08:23 AM     Modules accepted: Level of Service     4 = No assist / stand by assistance

## 2025-08-19 ENCOUNTER — APPOINTMENT (OUTPATIENT)
Dept: OBGYN | Facility: CLINIC | Age: 80
End: 2025-08-19
Payer: MEDICARE

## 2025-08-19 VITALS
SYSTOLIC BLOOD PRESSURE: 136 MMHG | BODY MASS INDEX: 35.44 KG/M2 | HEIGHT: 63 IN | DIASTOLIC BLOOD PRESSURE: 70 MMHG | WEIGHT: 200 LBS

## 2025-08-19 DIAGNOSIS — Z01.419 ENCOUNTER FOR GYNECOLOGICAL EXAMINATION (GENERAL) (ROUTINE) W/OUT ABNORMAL FINDINGS: ICD-10-CM

## 2025-08-19 PROCEDURE — G0328 FECAL BLOOD SCRN IMMUNOASSAY: CPT | Mod: QW

## 2025-08-19 PROCEDURE — 99213 OFFICE O/P EST LOW 20 MIN: CPT | Mod: 25

## 2025-08-20 ENCOUNTER — RX RENEWAL (OUTPATIENT)
Age: 80
End: 2025-08-20

## 2025-09-02 ENCOUNTER — APPOINTMENT (OUTPATIENT)
Dept: FAMILY MEDICINE | Facility: CLINIC | Age: 80
End: 2025-09-02

## 2025-09-15 ENCOUNTER — APPOINTMENT (OUTPATIENT)
Dept: FAMILY MEDICINE | Facility: CLINIC | Age: 80
End: 2025-09-15
Payer: MEDICARE

## 2025-09-15 VITALS
HEIGHT: 63 IN | WEIGHT: 199 LBS | OXYGEN SATURATION: 97 % | DIASTOLIC BLOOD PRESSURE: 60 MMHG | TEMPERATURE: 97.4 F | SYSTOLIC BLOOD PRESSURE: 122 MMHG | HEART RATE: 72 BPM | BODY MASS INDEX: 35.26 KG/M2

## 2025-09-15 DIAGNOSIS — E78.00 PURE HYPERCHOLESTEROLEMIA, UNSPECIFIED: ICD-10-CM

## 2025-09-15 DIAGNOSIS — G47.33 OBSTRUCTIVE SLEEP APNEA (ADULT) (PEDIATRIC): ICD-10-CM

## 2025-09-15 DIAGNOSIS — E11.9 TYPE 2 DIABETES MELLITUS W/OUT COMPLICATIONS: ICD-10-CM

## 2025-09-15 DIAGNOSIS — I10 ESSENTIAL (PRIMARY) HYPERTENSION: ICD-10-CM

## 2025-09-15 DIAGNOSIS — E78.41 ELEVATED LIPOPROTEIN(A): ICD-10-CM

## 2025-09-15 DIAGNOSIS — J44.9 CHRONIC OBSTRUCTIVE PULMONARY DISEASE, UNSPECIFIED: ICD-10-CM

## 2025-09-15 DIAGNOSIS — I48.0 PAROXYSMAL ATRIAL FIBRILLATION: ICD-10-CM

## 2025-09-15 DIAGNOSIS — Z72.0 TOBACCO USE: ICD-10-CM

## 2025-09-15 PROCEDURE — G2211 COMPLEX E/M VISIT ADD ON: CPT

## 2025-09-15 PROCEDURE — 99215 OFFICE O/P EST HI 40 MIN: CPT

## 2025-09-15 PROCEDURE — G0447 BEHAVIOR COUNSEL OBESITY 15M: CPT | Mod: 59

## 2025-09-17 ENCOUNTER — APPOINTMENT (OUTPATIENT)
Dept: INTERNAL MEDICINE | Facility: CLINIC | Age: 80
End: 2025-09-17